# Patient Record
Sex: MALE | Race: WHITE | NOT HISPANIC OR LATINO
[De-identification: names, ages, dates, MRNs, and addresses within clinical notes are randomized per-mention and may not be internally consistent; named-entity substitution may affect disease eponyms.]

---

## 2017-08-23 ENCOUNTER — APPOINTMENT (OUTPATIENT)
Dept: PULMONOLOGY | Facility: CLINIC | Age: 72
End: 2017-08-23
Payer: MEDICARE

## 2017-08-23 VITALS
OXYGEN SATURATION: 93 % | RESPIRATION RATE: 16 BRPM | SYSTOLIC BLOOD PRESSURE: 115 MMHG | WEIGHT: 194 LBS | DIASTOLIC BLOOD PRESSURE: 80 MMHG | HEIGHT: 69 IN | HEART RATE: 59 BPM | BODY MASS INDEX: 28.73 KG/M2

## 2017-08-23 DIAGNOSIS — Z86.39 PERSONAL HISTORY OF OTHER ENDOCRINE, NUTRITIONAL AND METABOLIC DISEASE: ICD-10-CM

## 2017-08-23 DIAGNOSIS — Z82.49 FAMILY HISTORY OF ISCHEMIC HEART DISEASE AND OTHER DISEASES OF THE CIRCULATORY SYSTEM: ICD-10-CM

## 2017-08-23 DIAGNOSIS — Z87.438 PERSONAL HISTORY OF OTHER DISEASES OF MALE GENITAL ORGANS: ICD-10-CM

## 2017-08-23 DIAGNOSIS — Z81.8 FAMILY HISTORY OF OTHER MENTAL AND BEHAVIORAL DISORDERS: ICD-10-CM

## 2017-08-23 DIAGNOSIS — Z78.9 OTHER SPECIFIED HEALTH STATUS: ICD-10-CM

## 2017-08-23 DIAGNOSIS — Z86.79 PERSONAL HISTORY OF OTHER DISEASES OF THE CIRCULATORY SYSTEM: ICD-10-CM

## 2017-08-23 PROCEDURE — 94010 BREATHING CAPACITY TEST: CPT

## 2017-08-23 PROCEDURE — 99205 OFFICE O/P NEW HI 60 MIN: CPT | Mod: 25

## 2017-08-23 PROCEDURE — 71020: CPT

## 2017-08-23 RX ORDER — ALBUTEROL SULFATE 90 UG/1
108 (90 BASE) AEROSOL, METERED RESPIRATORY (INHALATION) EVERY 6 HOURS
Qty: 3 | Refills: 1 | Status: ACTIVE | COMMUNITY
Start: 2017-08-23 | End: 1900-01-01

## 2017-08-23 RX ORDER — OLOPATADINE HYDROCHLORIDE 665 UG/1
0.6 SPRAY, METERED NASAL
Qty: 3 | Refills: 1 | Status: ACTIVE | COMMUNITY
Start: 2017-08-23 | End: 1900-01-01

## 2017-08-23 RX ORDER — AMLODIPINE BESYLATE 10 MG/1
10 TABLET ORAL
Refills: 0 | Status: ACTIVE | COMMUNITY

## 2017-08-23 RX ORDER — ATORVASTATIN CALCIUM 20 MG/1
20 TABLET, FILM COATED ORAL
Refills: 0 | Status: ACTIVE | COMMUNITY

## 2017-11-28 ENCOUNTER — APPOINTMENT (OUTPATIENT)
Dept: PULMONOLOGY | Facility: CLINIC | Age: 72
End: 2017-11-28
Payer: MEDICARE

## 2017-11-28 VITALS
DIASTOLIC BLOOD PRESSURE: 80 MMHG | WEIGHT: 194 LBS | BODY MASS INDEX: 28.73 KG/M2 | OXYGEN SATURATION: 96 % | HEIGHT: 69 IN | SYSTOLIC BLOOD PRESSURE: 122 MMHG | HEART RATE: 80 BPM

## 2017-11-28 PROCEDURE — 94010 BREATHING CAPACITY TEST: CPT

## 2017-11-28 PROCEDURE — 99214 OFFICE O/P EST MOD 30 MIN: CPT | Mod: 25

## 2017-11-28 RX ORDER — MONTELUKAST SODIUM 10 MG/1
10 TABLET, FILM COATED ORAL
Qty: 3 | Refills: 1 | Status: ACTIVE | COMMUNITY
Start: 2017-11-28 | End: 1900-01-01

## 2018-01-30 ENCOUNTER — APPOINTMENT (OUTPATIENT)
Dept: UROLOGY | Facility: CLINIC | Age: 73
End: 2018-01-30
Payer: MEDICARE

## 2018-01-30 DIAGNOSIS — Z00.00 ENCOUNTER FOR GENERAL ADULT MEDICAL EXAMINATION W/OUT ABNORMAL FINDINGS: ICD-10-CM

## 2018-01-30 DIAGNOSIS — R97.20 ELEVATED PROSTATE, SPECIFIC ANTIGEN [PSA]: ICD-10-CM

## 2018-01-30 PROCEDURE — 99204 OFFICE O/P NEW MOD 45 MIN: CPT

## 2018-01-31 LAB
APPEARANCE: CLEAR
BACTERIA: NEGATIVE
BILIRUBIN URINE: NEGATIVE
BLOOD URINE: NEGATIVE
COLOR: YELLOW
GLUCOSE QUALITATIVE U: NEGATIVE MG/DL
HYALINE CASTS: 4 /LPF
KETONES URINE: NEGATIVE
LEUKOCYTE ESTERASE URINE: NEGATIVE
MICROSCOPIC-UA: NORMAL
NITRITE URINE: NEGATIVE
PH URINE: 5
PROTEIN URINE: ABNORMAL MG/DL
PSA FREE FLD-MCNC: 20.8
PSA FREE SERPL-MCNC: 1.19 NG/ML
PSA SERPL-MCNC: 5.71 NG/ML
RED BLOOD CELLS URINE: 5 /HPF
SPECIFIC GRAVITY URINE: 1.03
SQUAMOUS EPITHELIAL CELLS: 0 /HPF
UROBILINOGEN URINE: NEGATIVE MG/DL
WHITE BLOOD CELLS URINE: 1 /HPF

## 2018-02-01 LAB
BACTERIA UR CULT: NORMAL
CORE LAB FLUID CYTOLOGY: NORMAL

## 2018-02-13 ENCOUNTER — OTHER (OUTPATIENT)
Age: 73
End: 2018-02-13

## 2018-02-26 ENCOUNTER — FORM ENCOUNTER (OUTPATIENT)
Age: 73
End: 2018-02-26

## 2018-02-27 ENCOUNTER — APPOINTMENT (OUTPATIENT)
Dept: MRI IMAGING | Facility: CLINIC | Age: 73
End: 2018-02-27
Payer: MEDICARE

## 2018-02-27 ENCOUNTER — OUTPATIENT (OUTPATIENT)
Dept: OUTPATIENT SERVICES | Facility: HOSPITAL | Age: 73
LOS: 1 days | End: 2018-02-27
Payer: MEDICARE

## 2018-02-27 DIAGNOSIS — N40.1 BENIGN PROSTATIC HYPERPLASIA WITH LOWER URINARY TRACT SYMPTOMS: ICD-10-CM

## 2018-02-27 PROCEDURE — 82565 ASSAY OF CREATININE: CPT

## 2018-02-27 PROCEDURE — A9585: CPT

## 2018-02-27 PROCEDURE — 72197 MRI PELVIS W/O & W/DYE: CPT | Mod: 26

## 2018-02-27 PROCEDURE — 72197 MRI PELVIS W/O & W/DYE: CPT

## 2018-03-27 ENCOUNTER — APPOINTMENT (OUTPATIENT)
Dept: PULMONOLOGY | Facility: CLINIC | Age: 73
End: 2018-03-27
Payer: MEDICARE

## 2018-03-27 VITALS
SYSTOLIC BLOOD PRESSURE: 110 MMHG | DIASTOLIC BLOOD PRESSURE: 80 MMHG | OXYGEN SATURATION: 98 % | RESPIRATION RATE: 17 BRPM | BODY MASS INDEX: 28.73 KG/M2 | HEART RATE: 57 BPM | WEIGHT: 194 LBS | HEIGHT: 69 IN

## 2018-03-27 PROCEDURE — 99214 OFFICE O/P EST MOD 30 MIN: CPT | Mod: 25

## 2018-03-27 PROCEDURE — 94010 BREATHING CAPACITY TEST: CPT

## 2018-04-03 ENCOUNTER — RX RENEWAL (OUTPATIENT)
Age: 73
End: 2018-04-03

## 2018-06-07 ENCOUNTER — RX RENEWAL (OUTPATIENT)
Age: 73
End: 2018-06-07

## 2018-07-31 ENCOUNTER — APPOINTMENT (OUTPATIENT)
Dept: PULMONOLOGY | Facility: CLINIC | Age: 73
End: 2018-07-31
Payer: MEDICARE

## 2018-07-31 ENCOUNTER — NON-APPOINTMENT (OUTPATIENT)
Age: 73
End: 2018-07-31

## 2018-07-31 VITALS
SYSTOLIC BLOOD PRESSURE: 132 MMHG | WEIGHT: 198 LBS | RESPIRATION RATE: 17 BRPM | BODY MASS INDEX: 28.35 KG/M2 | OXYGEN SATURATION: 96 % | DIASTOLIC BLOOD PRESSURE: 82 MMHG | HEART RATE: 53 BPM | HEIGHT: 70 IN

## 2018-07-31 PROCEDURE — 99214 OFFICE O/P EST MOD 30 MIN: CPT | Mod: 25

## 2018-07-31 PROCEDURE — 95012 NITRIC OXIDE EXP GAS DETER: CPT

## 2018-07-31 PROCEDURE — 94010 BREATHING CAPACITY TEST: CPT

## 2018-07-31 RX ORDER — PREDNISONE 10 MG/1
10 TABLET ORAL
Qty: 100 | Refills: 0 | Status: DISCONTINUED | COMMUNITY
Start: 2017-08-23 | End: 2018-07-31

## 2018-07-31 RX ORDER — CLARITHROMYCIN 500 MG/1
500 TABLET, FILM COATED ORAL
Qty: 20 | Refills: 0 | Status: DISCONTINUED | COMMUNITY
Start: 2017-08-23 | End: 2018-07-31

## 2018-09-03 PROBLEM — R97.20 ELEVATED PROSTATE SPECIFIC ANTIGEN (PSA): Status: ACTIVE | Noted: 2018-01-30

## 2018-11-26 ENCOUNTER — FORM ENCOUNTER (OUTPATIENT)
Age: 73
End: 2018-11-26

## 2018-11-27 ENCOUNTER — APPOINTMENT (OUTPATIENT)
Dept: MRI IMAGING | Facility: CLINIC | Age: 73
End: 2018-11-27
Payer: MEDICARE

## 2018-11-27 ENCOUNTER — OUTPATIENT (OUTPATIENT)
Dept: OUTPATIENT SERVICES | Facility: HOSPITAL | Age: 73
LOS: 1 days | End: 2018-11-27
Payer: MEDICARE

## 2018-11-27 ENCOUNTER — NON-APPOINTMENT (OUTPATIENT)
Age: 73
End: 2018-11-27

## 2018-11-27 ENCOUNTER — APPOINTMENT (OUTPATIENT)
Dept: PULMONOLOGY | Facility: CLINIC | Age: 73
End: 2018-11-27
Payer: MEDICARE

## 2018-11-27 VITALS
HEIGHT: 70 IN | DIASTOLIC BLOOD PRESSURE: 74 MMHG | HEART RATE: 59 BPM | OXYGEN SATURATION: 98 % | SYSTOLIC BLOOD PRESSURE: 124 MMHG | WEIGHT: 202 LBS | BODY MASS INDEX: 28.92 KG/M2

## 2018-11-27 DIAGNOSIS — M25.511 PAIN IN RIGHT SHOULDER: ICD-10-CM

## 2018-11-27 DIAGNOSIS — Z00.8 ENCOUNTER FOR OTHER GENERAL EXAMINATION: ICD-10-CM

## 2018-11-27 PROCEDURE — 95012 NITRIC OXIDE EXP GAS DETER: CPT

## 2018-11-27 PROCEDURE — 73221 MRI JOINT UPR EXTREM W/O DYE: CPT | Mod: 26,RT

## 2018-11-27 PROCEDURE — 94010 BREATHING CAPACITY TEST: CPT

## 2018-11-27 PROCEDURE — 73221 MRI JOINT UPR EXTREM W/O DYE: CPT

## 2018-11-27 PROCEDURE — 99214 OFFICE O/P EST MOD 30 MIN: CPT | Mod: 25

## 2018-11-27 NOTE — HISTORY OF PRESENT ILLNESS
[FreeTextEntry1] : Mr. Amato is a 73 year old male presenting to the office today for a follow up visit for allergies, asthma, GERD, SIOBHAN, overweight, snoring, and SOB. His chief complaint is nocturia. \par - He comes in stating that he feels great\par - He notes occasionally morning congestion upon waking up. \par - He is not exercising\par - He continues to experience nocturia. On average, he gets up four times per night \par - He is taking Tamsulosin for her prostate at around 7-8PM. The amount of times he gets up to urinate remains unchanged. \par - He states that he rarely experiences reflux, and, if he does, it is associated with something he ate. \par - his memory and concentration is fine\par - He reports having had progressive right shoulder pain. He notes that it is exacerbated with any kind of movement or pressure. \par - He denies any headaches, nausea, vomiting, fever, chills, sweats, chest pain, chest pressure, palpitations, SOB, coughing, wheezing, fatigue, diarrhea, constipation, dysphagia, dizziness, leg swelling, leg pain, itchy eyes, itchy ears, or sour taste in the mouth.

## 2018-11-27 NOTE — PROCEDURE
[FreeTextEntry1] : PFT- spi reveals normal flows; FEV1 was 2.47L which is 82% of predicted; normal flow volume loop \par \par FENO was 29; a normal value being less than 25\par Fractional exhaled nitric oxide (FENO) is regarded as a simple, noninvasive method for assessing eosinophilic airway inflammation. Produced by a variety of cells within the lung, nitric oxide (NO) concentrations are generally low in healthy individuals. However, high concentrations of NO appear to be involved in nonspecific host defense mechanisms and chronic inflammatory diseases such as asthma. The American Thoracic Society (ATS) therefore has recommended using FENO to aid in the diagnosis and monitoring of eosinophilic airway inflammation and asthma, and for identifying steroid responsive individuals whose chronic respiratory symptoms may be caused by airway inflammation. \par \par \par

## 2018-11-27 NOTE — ASSESSMENT
[FreeTextEntry1] : Mr. Amato is a 73 year old male with a history of asthma allergy, GERD, and OSAS. His number one issue today was nocturia. \par \par problem 1: asthma\par -continue Singulair 10mg QHS \par -continue Breo Ellipta 200 at 1 inhalation QD\par -continue Ventolin PRN and before exercise\par -Asthma is  believed to be caused by inherited (genetic) and environmental factor, but its exact cause is unknown. Asthma may be triggered by allergens, lung infections, or irritants in the air. Asthma triggers are different for each person\par -Inhaler technique reviewed as well as oral hygiene techniques reviewed with patient. Avoidance of cold air, extremes of temperature, rescue inhaler should be used before exercise. Order of medication reviewed with patient. Recommended use of a cool mist humidifier in the bedroom.\par \par problem 2: post nasal drip syndrome/allergies\par -continue Clarinex 5 mg QHS \par -continue Olopatadine 0.6% 1 sniff/nostril BID\par \par -Environmental measures for allergies were encouraged including mattress and pillow cover, air purifier, and environmental controls.\par \par problem 3: GERD\par -continue Zantac 300 mg QHS\par -decrease caffeine \par \par -Rule of 2s: avoid eating too much, eating too late, eating too spicy, eating two hours before bed\par -Things to avoid including overeating, spicy foods, tight clothing, eating within three hours of bed, this list is not all inclusive. \par -For treatment of reflux, possible options discussed including diet control, H2 blockers, PPIs, as well as coating motility agents discussed as treatment options. Timing of meals and proximity of last meal to sleep were discussed. If symptoms persist, a formal gastrointestinal evaluation is needed.\par \par problem 5: overweight\par -Weight loss, exercise, and diet control were discussed and are highly encouraged. Treatment options were given such as, aqua therapy, and contacting a nutritionist. Recommended to use the elliptical, stationary bike, less use of treadmill.  Obesity is associated with worsening asthma, shortness of breath, and potential for cardiac disease, diabetes, and other underlying medical conditions.\par \par problem 6:  SIOBHAN\par -continue to use the oral appliance\par -repeat the sleep study with the oral appliance in place \par \par -Sleep apnea is associated with adverse clinical consequences which an affect most organ systems.  Cardiovascular disease risk includes arrhythmias, atrial fibrillation, hypertension, coronary artery disease, and stroke. Metabolic disorders include diabetes type 2, non-alcoholic fatty liver disease. Mood disorder especially depression; and cognitive decline especially in the elderly. Associations with  chronic reflux/Guadarrama’s esophagus some but not all inclusive. \par -Reasons to assess this include arousal consistent with hypopnea; respiratory events most prominent in REM sleep or supine position; therefore sleep staging and body position are important for accurate diagnosis and estimation of AHI.\par \par problem 7: health maintenance \par - Recommended Cialis 5 mg QHS for nocturia\par -recommended to change his Flomax to the night \par - He refused an influenza vaccine 2018\par -recommended strep pneumonia vaccines: Prevnar-13 vaccine, followed by Pneumo vaccine 23 one year following\par -recommended early intervention for URIs\par -recommended regular osteoporosis evaluations\par -recommended early dermatological evaluations\par -recommended after the age of 50 to the age of 70, colonoscopy every 5 years \par \par F/U in 2 months\par He is encouraged to call with any changes, concerns, or questions.

## 2018-11-27 NOTE — ADDENDUM
[FreeTextEntry1] : Documented by Wale Mosley acting as a scribe for Dr. Aris Shields on 11/27/18\par \par All medical record entries made by the Scribe were at my, Dr. Aris Shields's, direction and personally dictated by me on 11/27/18. I have reviewed the chart and agree that the record accurately reflects my personal performance of the history, physical exam, assessment and plan. I have also personally directed, reviewed, and agree with the discharge instructions. \par \par \par \par \par

## 2018-11-27 NOTE — REASON FOR VISIT
[Follow-Up] : a follow-up visit [Family Member] : family member [FreeTextEntry1] : allergies, asthma, GERD, SIOBHAN, overweight, snoring, and SOB

## 2018-11-29 ENCOUNTER — RX RENEWAL (OUTPATIENT)
Age: 73
End: 2018-11-29

## 2019-03-26 ENCOUNTER — APPOINTMENT (OUTPATIENT)
Dept: UROLOGY | Facility: CLINIC | Age: 74
End: 2019-03-26
Payer: MEDICARE

## 2019-03-26 PROCEDURE — 99214 OFFICE O/P EST MOD 30 MIN: CPT

## 2019-03-27 ENCOUNTER — APPOINTMENT (OUTPATIENT)
Dept: PULMONOLOGY | Facility: CLINIC | Age: 74
End: 2019-03-27
Payer: MEDICARE

## 2019-03-27 ENCOUNTER — NON-APPOINTMENT (OUTPATIENT)
Age: 74
End: 2019-03-27

## 2019-03-27 VITALS
DIASTOLIC BLOOD PRESSURE: 70 MMHG | HEART RATE: 60 BPM | HEIGHT: 70 IN | OXYGEN SATURATION: 98 % | RESPIRATION RATE: 17 BRPM | SYSTOLIC BLOOD PRESSURE: 125 MMHG | BODY MASS INDEX: 28.49 KG/M2 | WEIGHT: 199 LBS

## 2019-03-27 LAB
APPEARANCE: CLEAR
BACTERIA: NEGATIVE
BILIRUBIN URINE: NEGATIVE
BLOOD URINE: NEGATIVE
COLOR: YELLOW
GLUCOSE QUALITATIVE U: NEGATIVE
HYALINE CASTS: 1 /LPF
KETONES URINE: NEGATIVE
LEUKOCYTE ESTERASE URINE: NEGATIVE
MICROSCOPIC-UA: NORMAL
NITRITE URINE: NEGATIVE
PH URINE: 5.5
PROTEIN URINE: NORMAL
PSA FREE FLD-MCNC: 26 %
PSA FREE SERPL-MCNC: 1.51 NG/ML
PSA SERPL-MCNC: 5.89 NG/ML
RED BLOOD CELLS URINE: 1 /HPF
SPECIFIC GRAVITY URINE: 1.03
SQUAMOUS EPITHELIAL CELLS: 0 /HPF
UROBILINOGEN URINE: NORMAL
WHITE BLOOD CELLS URINE: 0 /HPF

## 2019-03-27 PROCEDURE — 94010 BREATHING CAPACITY TEST: CPT

## 2019-03-27 PROCEDURE — 99214 OFFICE O/P EST MOD 30 MIN: CPT | Mod: 25

## 2019-03-27 NOTE — HISTORY OF PRESENT ILLNESS
[FreeTextEntry1] : Mr. Amato is a 73 year old male presenting to the office today for a follow up visit for allergies, asthma, GERD, SIOBHAN, overweight, snoring, and SOB. His chief complaint is nocturia \par - He comes in stating that he feels great following a 3 week bout of URI. Once he began his medicine, he felt that he was able to overcome fairly easily. \par - He has nocturia 3-4x/night. \par - He saw Dr. Zamora who recommended increasing his Flomax to 2 \par - He states that his memory has been relatively poor as he misplaces his things. \par - His bowels are regular\par - His senses of smell, vision, and taste are fine, but he is having some hearing difficulty \par - His breathing is generally well. \par - He  denies any headaches, nausea, vomiting, fever, chills, sweats, chest pain, chest pressure, palpitations, SOB, fatigue, diarrhea, constipation, dysphagia, myalgias, dizziness, leg swelling, leg pain, itchy eyes, itchy ears, heartburn, reflux, or sour taste in the mouth.

## 2019-03-27 NOTE — ASSESSMENT
[FreeTextEntry1] : Mr. Amato is a 73 year old male with a history of asthma allergy, GERD, and OSAS. His number one issue today is nocturia. \par \par problem 1: asthma (stable)\par -continue Singulair 10mg QHS \par -continue Breo Ellipta 200 at 1 inhalation QD\par -continue Ventolin PRN and before exercise\par -Asthma is  believed to be caused by inherited (genetic) and environmental factor, but its exact cause is unknown. Asthma may be triggered by allergens, lung infections, or irritants in the air. Asthma triggers are different for each person\par -Inhaler technique reviewed as well as oral hygiene techniques reviewed with patient. Avoidance of cold air, extremes of temperature, rescue inhaler should be used before exercise. Order of medication reviewed with patient. Recommended use of a cool mist humidifier in the bedroom.\par \par problem 2: post nasal drip syndrome/allergies\par -continue Clarinex 5 mg QHS \par -continue Olopatadine 0.6% 1 sniff/nostril BID\par \par -Environmental measures for allergies were encouraged including mattress and pillow cover, air purifier, and environmental controls.\par \par problem 3: GERD\par -continue Zantac 300 mg QHS\par -decrease caffeine \par \par -Rule of 2s: avoid eating too much, eating too late, eating too spicy, eating two hours before bed\par -Things to avoid including overeating, spicy foods, tight clothing, eating within three hours of bed, this list is not all inclusive. \par -For treatment of reflux, possible options discussed including diet control, H2 blockers, PPIs, as well as coating motility agents discussed as treatment options. Timing of meals and proximity of last meal to sleep were discussed. If symptoms persist, a formal gastrointestinal evaluation is needed.\par \par problem 5: overweight\par -Weight loss, exercise, and diet control were discussed and are highly encouraged. Treatment options were given such as, aqua therapy, and contacting a nutritionist. Recommended to use the elliptical, stationary bike, less use of treadmill.  Obesity is associated with worsening asthma, shortness of breath, and potential for cardiac disease, diabetes, and other underlying medical conditions.\par \par problem 6:  SIOBHAN\par -continue to use the oral appliance\par -repeat the sleep study with the oral appliance in place \par \par -Sleep apnea is associated with adverse clinical consequences which an affect most organ systems.  Cardiovascular disease risk includes arrhythmias, atrial fibrillation, hypertension, coronary artery disease, and stroke. Metabolic disorders include diabetes type 2, non-alcoholic fatty liver disease. Mood disorder especially depression; and cognitive decline especially in the elderly. Associations with  chronic reflux/Guadarrama’s esophagus some but not all inclusive. \par -Reasons to assess this include arousal consistent with hypopnea; respiratory events most prominent in REM sleep or supine position; therefore sleep staging and body position are important for accurate diagnosis and estimation of AHI.\par \par problem 7: health maintenance \par - Recommended Cialis 5 mg QHS for nocturia\par -Continue Flomax 4 mg BID\par - He refused an influenza vaccine 2018\par -recommended strep pneumonia vaccines: Prevnar-13 vaccine, followed by Pneumo vaccine 23 one year following\par -recommended early intervention for URIs\par -recommended regular osteoporosis evaluations\par -recommended early dermatological evaluations\par -recommended after the age of 50 to the age of 70, colonoscopy every 5 years \par \par F/U in 4 months\par He is encouraged to call with any changes, concerns, or questions.

## 2019-03-27 NOTE — ADDENDUM
[FreeTextEntry1] : Documented by Wale Mosley acting as a scribe for Dr. Aris Shields on 3/27/2019\par \par All medical record entries made by the Scribe were at my, Dr. Aris Shields's, direction and personally dictated by me on 3/27/2019. I have reviewed the chart and agree that the record accurately reflects my personal performance of the history, physical exam, assessment and plan. I have also personally directed, reviewed, and agree with the discharge instructions. \par \par \par \par \par

## 2019-03-27 NOTE — PROCEDURE
[FreeTextEntry1] : PFT- spi reveals normal flows; FEV1 was 2.51L which is 83% of predicted; normal flow volume loop

## 2019-07-24 ENCOUNTER — APPOINTMENT (OUTPATIENT)
Dept: PULMONOLOGY | Facility: CLINIC | Age: 74
End: 2019-07-24
Payer: MEDICARE

## 2019-07-24 ENCOUNTER — NON-APPOINTMENT (OUTPATIENT)
Age: 74
End: 2019-07-24

## 2019-07-24 VITALS
HEIGHT: 68 IN | HEART RATE: 77 BPM | RESPIRATION RATE: 16 BRPM | SYSTOLIC BLOOD PRESSURE: 140 MMHG | DIASTOLIC BLOOD PRESSURE: 84 MMHG | BODY MASS INDEX: 29.4 KG/M2 | OXYGEN SATURATION: 97 % | WEIGHT: 194 LBS

## 2019-07-24 PROCEDURE — 95012 NITRIC OXIDE EXP GAS DETER: CPT

## 2019-07-24 PROCEDURE — 94010 BREATHING CAPACITY TEST: CPT

## 2019-07-24 PROCEDURE — 99214 OFFICE O/P EST MOD 30 MIN: CPT | Mod: 25

## 2019-07-24 RX ORDER — TAMSULOSIN HYDROCHLORIDE 0.4 MG/1
0.4 CAPSULE ORAL
Refills: 0 | Status: DISCONTINUED | COMMUNITY
End: 2019-07-24

## 2019-07-24 NOTE — ASSESSMENT
[FreeTextEntry1] : Mr. Amato is a 74 year old male with a history of asthma allergy, GERD, and OSAS. His number one issue today is still nocturia. \par \par problem 1: asthma (stable)\par -continue Singulair 10mg QHS \par -continue Breo Ellipta 200 at 1 inhalation QD\par -continue Ventolin PRN and before exercise\par -Asthma is  believed to be caused by inherited (genetic) and environmental factor, but its exact cause is unknown. Asthma may be triggered by allergens, lung infections, or irritants in the air. Asthma triggers are different for each person\par -Inhaler technique reviewed as well as oral hygiene techniques reviewed with patient. Avoidance of cold air, extremes of temperature, rescue inhaler should be used before exercise. Order of medication reviewed with patient. Recommended use of a cool mist humidifier in the bedroom.\par \par problem 2: post nasal drip syndrome/allergies\par -continue Clarinex 5 mg QHS \par -continue Olopatadine 0.6% 1 sniff/nostril BID\par \par -Environmental measures for allergies were encouraged including mattress and pillow cover, air purifier, and environmental controls.\par \par problem 3: GERD\par -continue Zantac 300 mg QHS\par -decrease caffeine \par \par -Rule of 2s: avoid eating too much, eating too late, eating too spicy, eating two hours before bed\par -Things to avoid including overeating, spicy foods, tight clothing, eating within three hours of bed, this list is not all inclusive. \par -For treatment of reflux, possible options discussed including diet control, H2 blockers, PPIs, as well as coating motility agents discussed as treatment options. Timing of meals and proximity of last meal to sleep were discussed. If symptoms persist, a formal gastrointestinal evaluation is needed.\par \par problem 5: overweight\par -Weight loss, exercise, and diet control were discussed and are highly encouraged. Treatment options were given such as, aqua therapy, and contacting a nutritionist. Recommended to use the elliptical, stationary bike, less use of treadmill.  Obesity is associated with worsening asthma, shortness of breath, and potential for cardiac disease, diabetes, and other underlying medical conditions.\par \par problem 6:  SIOBHAN\par -continue to use the oral appliance\par -repeat the sleep study with the oral appliance in place \par \par -Sleep apnea is associated with adverse clinical consequences which an affect most organ systems.  Cardiovascular disease risk includes arrhythmias, atrial fibrillation, hypertension, coronary artery disease, and stroke. Metabolic disorders include diabetes type 2, non-alcoholic fatty liver disease. Mood disorder especially depression; and cognitive decline especially in the elderly. Associations with  chronic reflux/Guadarrama’s esophagus some but not all inclusive. \par -Reasons to assess this include arousal consistent with hypopnea; respiratory events most prominent in REM sleep or supine position; therefore sleep staging and body position are important for accurate diagnosis and estimation of AHI.\par \par problem 7: health maintenance (Steckel)\par - Recommended Cialis 5 mg QHS for nocturia\par -Continue Flomax 4 mg BID\par - He refused an influenza vaccine 2018\par -recommended strep pneumonia vaccines: Prevnar-13 vaccine, followed by Pneumo vaccine 23 one year following\par -recommended early intervention for URIs\par -recommended regular osteoporosis evaluations\par -recommended early dermatological evaluations\par -recommended after the age of 50 to the age of 70, colonoscopy every 5 years \par \par F/U in 4 months with SPI and NiOx\par He is encouraged to call with any changes, concerns, or questions.

## 2019-07-24 NOTE — ADDENDUM
[FreeTextEntry1] : Documented by Alberto Conteh acting as a scribe for Dr. Aris Shields on 07/24/2019.\par \par All medical record entries made by the Scribe were at my, Dr. Aris Shields's, direction and personally dictated by me on 07/24/2019. I have reviewed the chart and agree that the record accurately reflects my personal performance of the history, physical exam, assessment and plan. I have also personally directed, reviewed, and agree with the discharge instructions. \par \par \par

## 2019-07-24 NOTE — HISTORY OF PRESENT ILLNESS
[FreeTextEntry1] : Mr. Amato is a 74 year old male presenting to the office today for a follow up visit for allergies, asthma, GERD, SIOBHAN, overweight, snoring, and SOB. His chief complaint is nocturia.\par -he reports feeling very well\par -he reports having lost weight by controlling his diet, not snacking, and exercising more\par -He notes His bowels are regular\par -he reports waking with nocturia 3-4 times nightly.  He notes he drinks at night when he is thirsty or dry, at around 8 PM\par -he reports he drinks 3-4 cups of coffee daily, and is unable to lessen his consumption, and his last cup is at about 4-5 PM\par -he reports he usually goes to sleep at around 11:30 PM\par -he denies taking any new medications, vitamins, or supplements, except vitamin D\par -he denies any SOB, coughing, wheezing, chest pain, chest pressure, diarrhea, constipation, dysphagia, dizziness, leg swelling, leg pain, heartburn, reflux, sour taste in the mouth, myalgias or arthralgias.

## 2019-07-24 NOTE — REVIEW OF SYSTEMS
[Nocturia] : nocturia [Negative] : Sleep Disorder [Recent Wt Loss (___ Lbs)] : recent [unfilled] ~Ulb weight loss [As Noted in HPI] : as noted in HPI [Cough] : no cough [Wheezing] : no wheezing [Dyspnea] : no dyspnea

## 2019-07-24 NOTE — PHYSICAL EXAM
[Well Groomed] : well groomed [Normal Appearance] : normal appearance [General Appearance - Well Developed] : well developed [No Deformities] : no deformities [General Appearance - Well Nourished] : well nourished [General Appearance - In No Acute Distress] : no acute distress [Eyelids - No Xanthelasma] : the eyelids demonstrated no xanthelasmas [Normal Conjunctiva] : the conjunctiva exhibited no abnormalities [Normal Oropharynx] : normal oropharynx [II] : II [Neck Appearance] : the appearance of the neck was normal [Thyroid Diffuse Enlargement] : the thyroid was not enlarged [Jugular Venous Distention Increased] : there was no jugular-venous distention [Neck Cervical Mass (___cm)] : no neck mass was observed [Thyroid Nodule] : there were no palpable thyroid nodules [Heart Sounds] : normal S1 and S2 [Heart Rate And Rhythm] : heart rate and rhythm were normal [Murmurs] : no murmurs present [Respiration, Rhythm And Depth] : normal respiratory rhythm and effort [Exaggerated Use Of Accessory Muscles For Inspiration] : no accessory muscle use [Auscultation Breath Sounds / Voice Sounds] : lungs were clear to auscultation bilaterally [Abdomen Tenderness] : non-tender [Abdomen Soft] : soft [Abdomen Mass (___ Cm)] : no abdominal mass palpated [Abnormal Walk] : normal gait [Gait - Sufficient For Exercise Testing] : the gait was sufficient for exercise testing [Cyanosis, Localized] : no localized cyanosis [Nail Clubbing] : no clubbing of the fingernails [Petechial Hemorrhages (___cm)] : no petechial hemorrhages [Skin Color & Pigmentation] : normal skin color and pigmentation [] : no rash [No Venous Stasis] : no venous stasis [Skin Lesions] : no skin lesions [No Xanthoma] : no  xanthoma was observed [No Skin Ulcers] : no skin ulcer [Deep Tendon Reflexes (DTR)] : deep tendon reflexes were 2+ and symmetric [Sensation] : the sensory exam was normal to light touch and pinprick [No Focal Deficits] : no focal deficits [Impaired Insight] : insight and judgment were intact [Oriented To Time, Place, And Person] : oriented to person, place, and time [Affect] : the affect was normal [FreeTextEntry1] : I:E 1:3; clear

## 2019-07-24 NOTE — PROCEDURE
[FreeTextEntry1] : PFT revealed normal flows, with a FEV1 of 2.77L, which is 97% of predicted, with a normal flow volume loop \par \par FENO was 30; a normal value being less than 25\par Fractional exhaled nitric oxide (FENO) is regarded as a simple, noninvasive method for assessing eosinophilic airway inflammation. Produced by a variety of cells within the lung, nitric oxide (NO) concentrations are generally low in healthy individuals. However, high concentrations of NO appear to be involved in nonspecific host defense mechanisms and chronic inflammatory diseases such as asthma. The American Thoracic Society (ATS) therefore has recommended using FENO to aid in the diagnosis and monitoring of eosinophilic airway inflammation and asthma, and for identifying steroid responsive individuals whose chronic respiratory symptoms may be caused by airway inflammation.

## 2019-09-18 ENCOUNTER — RX RENEWAL (OUTPATIENT)
Age: 74
End: 2019-09-18

## 2019-11-21 ENCOUNTER — NON-APPOINTMENT (OUTPATIENT)
Age: 74
End: 2019-11-21

## 2019-11-21 ENCOUNTER — APPOINTMENT (OUTPATIENT)
Dept: PULMONOLOGY | Facility: CLINIC | Age: 74
End: 2019-11-21
Payer: MEDICARE

## 2019-11-21 VITALS
HEART RATE: 57 BPM | OXYGEN SATURATION: 97 % | HEIGHT: 68 IN | DIASTOLIC BLOOD PRESSURE: 80 MMHG | RESPIRATION RATE: 16 BRPM | WEIGHT: 198 LBS | BODY MASS INDEX: 30.01 KG/M2 | SYSTOLIC BLOOD PRESSURE: 124 MMHG

## 2019-11-21 PROCEDURE — 99214 OFFICE O/P EST MOD 30 MIN: CPT | Mod: 25

## 2019-11-21 PROCEDURE — 95012 NITRIC OXIDE EXP GAS DETER: CPT

## 2019-11-21 PROCEDURE — 94010 BREATHING CAPACITY TEST: CPT

## 2019-11-21 NOTE — ADDENDUM
[FreeTextEntry1] : All medical record entries made by viv Minor were at Dr. Aris Shields's direction and personally dictated by me on 11/21/2019. I have reviewed the chart and agree that the record accurately reflects my personal performance of the history, physical exam, assessment and plan. I have also personally directed, reviewed, and agree with the discharge instructions. \par \par \par

## 2019-11-21 NOTE — REVIEW OF SYSTEMS
[As Noted in HPI] : as noted in HPI [Nocturia] : nocturia [Negative] : Sleep Disorder [Recent Wt Gain (___ Lbs)] : recent [unfilled] ~Ulb weight gain [Cough] : no cough [Dyspnea] : no dyspnea [Wheezing] : no wheezing

## 2019-11-21 NOTE — ASSESSMENT
[FreeTextEntry1] : Mr. Amato is a 74 year old male with a history of asthma, allergy, BPH, overweight, GERD, and OSAS. His number one issue today is still nocturia (still).\par \par His shortness of breath is multifactorial due to:\par -poor mechanics of breathing \par -out of shape / overweight\par -pulmonary disease -(asthma)\par \par problem 1: asthma -(stable)\par -continue Singulair 10 mg QHS \par -continue Breo Ellipta 200 at 1 inhalation QD\par -continue Ventolin PRN and before exercise\par -Asthma is  believed to be caused by inherited (genetic) and environmental factor, but its exact cause is unknown. Asthma may be triggered by allergens, lung infections, or irritants in the air. Asthma triggers are different for each person\par -Inhaler technique reviewed as well as oral hygiene techniques reviewed with patient. Avoidance of cold air, extremes of temperature, rescue inhaler should be used before exercise. Order of medication reviewed with patient. Recommended use of a cool mist humidifier in the bedroom.\par \par problem 2: post nasal drip syndrome / allergies\par -continue Clarinex 5 mg QHS \par -continue Olopatadine 0.6% 1 sniff/nostril BID\par -Environmental measures for allergies were encouraged including mattress and pillow cover, air purifier, and environmental controls.\par \par problem 3: GERD\par -continue Zantac 300 mg QHS\par -decrease caffeine \par -Rule of 2s: avoid eating too much, eating too late, eating too spicy, eating two hours before bed\par -Things to avoid including overeating, spicy foods, tight clothing, eating within three hours of bed, this list is not all inclusive. \par -For treatment of reflux, possible options discussed including diet control, H2 blockers, PPIs, as well as coating motility agents discussed as treatment options. Timing of meals and proximity of last meal to sleep were discussed. If symptoms persist, a formal gastrointestinal evaluation is needed.\par \par problem 5: overweight\par -Weight loss, exercise, and diet control were discussed and are highly encouraged. Treatment options were given such as, aqua therapy, and contacting a nutritionist. Recommended to use the elliptical, stationary bike, less use of treadmill.  Obesity is associated with worsening asthma, shortness of breath, and potential for cardiac disease, diabetes, and other underlying medical conditions.\par \par problem 6:  SIOBHAN\par -continue to use the oral appliance\par -repeat the sleep study with the oral appliance in place \par -Sleep apnea is associated with adverse clinical consequences which an affect most organ systems.  Cardiovascular disease risk includes arrhythmias, atrial fibrillation, hypertension, coronary artery disease, and stroke. Metabolic disorders include diabetes type 2, non-alcoholic fatty liver disease. Mood disorder especially depression; and cognitive decline especially in the elderly. Associations with  chronic reflux/Guadarrama’s esophagus some but not all inclusive. \par -Reasons to assess this include arousal consistent with hypopnea; respiratory events most prominent in REM sleep or supine position; therefore sleep staging and body position are important for accurate diagnosis and estimation of AHI.\par \par problem 7: poor breathing mechanics\par -Proper breathing techniques were reviewed with an emphasis of exhalation. Patient instructed to breath in for 1 second and out for four seconds. Patient was encouraged to not talk while walking.\par \par problem 8: health maintenance \par -Recommended Cialis 5 mg QHS for nocturia (Steckel)\par -Continue Flomax 4 mg BID\par -He refused an influenza vaccine 2018 / 2019\par -recommended strep pneumonia vaccines: Prevnar-13 vaccine, followed by Pneumo vaccine 23 one year following\par -recommended early intervention for URIs\par -recommended regular osteoporosis evaluations\par -recommended early dermatological evaluations\par -recommended after the age of 50 to the age of 70, colonoscopy every 5 years \par \par F/U in 4 months with SPI and NiOx\par He is encouraged to call with any changes, concerns, or questions.

## 2019-11-21 NOTE — PROCEDURE
[FreeTextEntry1] : PFT - spi reveals normal flows; FEV1 is 2.65 which is 93% of predicted, normal flow volume loop \par \par FENO was 21; a normal value being less than 25\par \par Fractional exhaled nitric oxide (FENO) is regarded as a simple, noninvasive method for assessing eosinophilic airway inflammation. Produced by a variety of cells within the lung, nitric oxide (NO) concentrations are generally low in healthy individuals. However, high concentrations of NO appear to be involved in nonspecific host defense mechanisms and chronic inflammatory diseases such as asthma. The American Thor[employer] Society (ATS) therefore has recommended using FENO to aid in the diagnosis and monitoring of eosinophilic airway inflammation and asthma, and for identifying steroid responsive individuals whose chronic respiratory symptoms may be caused by airway inflammation.

## 2019-11-21 NOTE — PHYSICAL EXAM

## 2020-03-31 ENCOUNTER — APPOINTMENT (OUTPATIENT)
Dept: UROLOGY | Facility: CLINIC | Age: 75
End: 2020-03-31

## 2020-04-01 ENCOUNTER — APPOINTMENT (OUTPATIENT)
Dept: PULMONOLOGY | Facility: CLINIC | Age: 75
End: 2020-04-01

## 2020-06-23 ENCOUNTER — RX RENEWAL (OUTPATIENT)
Age: 75
End: 2020-06-23

## 2020-07-14 ENCOUNTER — APPOINTMENT (OUTPATIENT)
Dept: UROLOGY | Facility: CLINIC | Age: 75
End: 2020-07-14
Payer: MEDICARE

## 2020-07-14 VITALS — TEMPERATURE: 97.8 F

## 2020-07-14 DIAGNOSIS — R97.20 ELEVATED PROSTATE, SPECIFIC ANTIGEN [PSA]: ICD-10-CM

## 2020-07-14 PROCEDURE — 99213 OFFICE O/P EST LOW 20 MIN: CPT

## 2020-07-14 NOTE — PHYSICAL EXAM
[General Appearance - Well Nourished] : well nourished [General Appearance - Well Developed] : well developed [Normal Appearance] : normal appearance [Well Groomed] : well groomed [Abdomen Soft] : soft [General Appearance - In No Acute Distress] : no acute distress [Abdomen Tenderness] : non-tender [Costovertebral Angle Tenderness] : no ~M costovertebral angle tenderness [Urethral Meatus] : meatus normal [Urinary Bladder Findings] : the bladder was normal on palpation [Scrotum] : the scrotum was normal [Testes Mass (___cm)] : there were no testicular masses [No Prostate Nodules] : no prostate nodules [Edema] : no peripheral edema [] : no respiratory distress [Respiration, Rhythm And Depth] : normal respiratory rhythm and effort [Exaggerated Use Of Accessory Muscles For Inspiration] : no accessory muscle use [Oriented To Time, Place, And Person] : oriented to person, place, and time [Affect] : the affect was normal [Mood] : the mood was normal [Not Anxious] : not anxious [Normal Station and Gait] : the gait and station were normal for the patient's age [No Focal Deficits] : no focal deficits [No Palpable Adenopathy] : no palpable adenopathy

## 2020-07-15 ENCOUNTER — APPOINTMENT (OUTPATIENT)
Dept: PULMONOLOGY | Facility: CLINIC | Age: 75
End: 2020-07-15
Payer: MEDICARE

## 2020-07-15 VITALS
DIASTOLIC BLOOD PRESSURE: 70 MMHG | SYSTOLIC BLOOD PRESSURE: 120 MMHG | RESPIRATION RATE: 17 BRPM | OXYGEN SATURATION: 98 % | HEART RATE: 60 BPM | HEIGHT: 68 IN | WEIGHT: 192 LBS | TEMPERATURE: 98.9 F | BODY MASS INDEX: 29.1 KG/M2

## 2020-07-15 LAB
APPEARANCE: CLEAR
BACTERIA: NEGATIVE
BILIRUBIN URINE: NEGATIVE
BLOOD URINE: NEGATIVE
COLOR: YELLOW
GLUCOSE QUALITATIVE U: NEGATIVE
HYALINE CASTS: 0 /LPF
KETONES URINE: NEGATIVE
LEUKOCYTE ESTERASE URINE: ABNORMAL
MICROSCOPIC-UA: NORMAL
NITRITE URINE: NEGATIVE
PH URINE: 6
PROTEIN URINE: ABNORMAL
PSA FREE FLD-MCNC: 24 %
PSA FREE SERPL-MCNC: 1.92 NG/ML
PSA SERPL-MCNC: 7.89 NG/ML
RED BLOOD CELLS URINE: 3 /HPF
SPECIFIC GRAVITY URINE: 1.03
SQUAMOUS EPITHELIAL CELLS: 0 /HPF
UROBILINOGEN URINE: NORMAL
WHITE BLOOD CELLS URINE: 3 /HPF

## 2020-07-15 PROCEDURE — 99214 OFFICE O/P EST MOD 30 MIN: CPT

## 2020-07-15 RX ORDER — RANITIDINE 300 MG/1
300 TABLET ORAL
Qty: 90 | Refills: 1 | Status: DISCONTINUED | COMMUNITY
Start: 2017-08-23 | End: 2020-07-15

## 2020-07-15 NOTE — PHYSICAL EXAM

## 2020-07-15 NOTE — ASSESSMENT
[FreeTextEntry1] : Mr. Amato is a 75 year old male with a history of asthma, allergy, BPH, overweight, GERD, and OSAS. His number one issue today is still nocturia (still).\par \par His shortness of breath is multifactorial due to:\par -poor mechanics of breathing \par -out of shape / overweight\par -pulmonary disease -(asthma)\par \par problem 1: asthma -(stable)\par -continue Singulair 10 mg QHS \par -continue Breo Ellipta 200 at 1 inhalation QD\par -continue Ventolin PRN and before exercise\par -Asthma is  believed to be caused by inherited (genetic) and environmental factor, but its exact cause is unknown. Asthma may be triggered by allergens, lung infections, or irritants in the air. Asthma triggers are different for each person\par -Inhaler technique reviewed as well as oral hygiene techniques reviewed with patient. Avoidance of cold air, extremes of temperature, rescue inhaler should be used before exercise. Order of medication reviewed with patient. 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 \par -For treatment of reflux, possible options discussed including diet control, H2 blockers, PPIs, as well as coating motility agents discussed as treatment options. Timing of meals and proximity of last meal to sleep were discussed. If symptoms persist, a formal gastrointestinal evaluation is needed.\par \par problem 5: overweight\par -Weight loss, exercise, and diet control were discussed and are highly encouraged. Treatment options were given such as, aqua therapy, and contacting a nutritionist. Recommended to use the elliptical, stationary bike, less use of treadmill.  Obesity is associated with worsening asthma, shortness of breath, and potential for cardiac disease, diabetes, and other underlying medical conditions.\par \par problem 6:  SIOBHAN\par -continue to use the oral appliance\par -repeat the sleep study with the oral appliance in place \par -Sleep apnea is associated with adverse clinical consequences which an affect most organ systems.  Cardiovascular disease risk includes arrhythmias, atrial fibrillation, hypertension, coronary artery disease, and stroke. Metabolic disorders include diabetes type 2, non-alcoholic fatty liver disease. Mood disorder especially depression; and cognitive decline especially in the elderly. Associations with  chronic reflux/Ugadarrama’s esophagus some but not all inclusive. \par -Reasons to assess this include arousal consistent with hypopnea; respiratory events most prominent in REM sleep or supine position; therefore sleep staging and body position are important for accurate diagnosis and estimation of AHI.\par \par problem 7: poor breathing mechanics\par -Proper breathing techniques were reviewed with an emphasis of exhalation. Patient instructed to breath in for 1 second and out for four seconds. Patient was encouraged to not talk while walking.\par \par Problem 8: Health Maintenance/COVID19 Precautions:\par - Clean your hands often. Wash your hands often with soap and water for at least 20 seconds, especially after blowing your nose, coughing, or sneezing, or having been in a public place.\par - If soap and water are not available, use a hand  that contains at least 60% alcohol.\par - To the extent possible, avoid touching high-touch surfaces in public places - elevator buttons, door handles, handrails, handshaking with people, etc. Use a tissue or your sleeve to cover your hand or finger if you must touch something.\par - Wash your hands after touching surfaces in public places.\par - Avoid touching your face, nose, eyes, etc.\par - Clean and disinfect your home to remove germs: practice routine cleaning of frequently touched surfaces (for example: tables, doorknobs, light switches, handles, desks, toilets, faucets, sinks & cell phones)\par - Avoid crowds, especially in poorly ventilated spaces. Your risk of exposure to respiratory viruses like COVID-19 may increase in crowded, closed-in settings with little air circulation if\par there are people in the crowd who are sick. All patients are recommended to practice social distancing and stay at least 6 feet away from others.\par - Avoid all non-essential travel including plane trips, and especially avoid embarking on cruise ships.\par -If COVID-19 is spreading in your community, take extra measures to put distance between yourself and other people to further reduce your risk of being exposed to this new virus.\par -Stay home as much as possible.\par - Consider ways of getting food brought to your house through family, social, or commercial networks\par -Be aware that the virus has been known to live in the air up to 3 hours post exposure, cardboard up to 24 hours post exposure, copper up to 4 hours post exposure, steel and plastic up to 2-3 days post exposure. Risk of transmission from these surfaces are affected by many variables.\par \par Immune Support Recommendations:\par -OTC Vitamin C 500mg BID \par -OTC Quercetin 250-500mg BID \par -OTC Zinc 75-100mg per day \par -OTC Melatonin 1or 2mg a night \par -OTC Vitamin D 1-4000mg per day\par -Tonic Water 8oz\par -Recommended to Stay Hydrated (At least a gallon/day)\par \par Asthma and COVID19:\par You need to make sure your asthma is under control. This often requires the use of inhaled corticosteroids (and sometimes oral corticosteroids). Inhaled corticosteroids do not likely reduce your immune system’s ability to fight infections, but oral corticosteroids may. It is important to use the steps above to protect yourself to limit your exposure to any respiratory virus. \par \par problem 9: health maintenance \par -Recommended Cialis 5 mg QHS for nocturia (Steckel)\par -Continue Flomax 4 mg BID\par -He refused an influenza vaccine 2018 / 2019\par -recommended strep pneumonia vaccines: Prevnar-13 vaccine, followed by Pneumo vaccine 23 one year following\par -recommended early intervention for URIs\par -recommended regular osteoporosis evaluations\par -recommended early dermatological evaluations\par -recommended after the age of 50 to the age of 70, colonoscopy every 5 years \par \par F/U in 4 months with SPI and NiOx\par He is encouraged to call with any changes, concerns, or questions.

## 2020-07-15 NOTE — HISTORY OF PRESENT ILLNESS
[FreeTextEntry1] : Mr. Amato is a 75 year old male presenting to the office today for a follow up visit for allergies, asthma, GERD, SIOBHAN, overweight, snoring, and SOB. His chief complaint is health maintenance. \par -he states that he is generally feeling well. \par -no problems at all.\par -he notes that His bowels are regular. \par -has been sleeping well except for nocturia. \par -he has gotten used to nocturia and does not mind it much anymore. \par -Sense of Smell is good. \par -Sense of Taste is good.\par -he wishes to reduce the amount of urination at night. \par \par -He denies any headaches, nausea, vomiting, fever, chills, sweats, chest pain, chest pressure, diarrhea, constipation, dysphagia, dizziness, sour taste in the mouth, leg swelling, leg pain, itchy eyes, itchy ears, heartburn, reflux, myalgias or arthralgias.

## 2020-12-01 ENCOUNTER — RX RENEWAL (OUTPATIENT)
Age: 75
End: 2020-12-01

## 2021-04-07 ENCOUNTER — APPOINTMENT (OUTPATIENT)
Dept: PULMONOLOGY | Facility: CLINIC | Age: 76
End: 2021-04-07
Payer: MEDICARE

## 2021-04-07 VITALS
HEART RATE: 50 BPM | HEIGHT: 68 IN | BODY MASS INDEX: 28.64 KG/M2 | TEMPERATURE: 97.3 F | DIASTOLIC BLOOD PRESSURE: 79 MMHG | SYSTOLIC BLOOD PRESSURE: 121 MMHG | RESPIRATION RATE: 17 BRPM | OXYGEN SATURATION: 98 % | WEIGHT: 189 LBS

## 2021-04-07 PROCEDURE — 99214 OFFICE O/P EST MOD 30 MIN: CPT | Mod: CS,25

## 2021-04-07 NOTE — ADDENDUM
[FreeTextEntry1] : Documented by Tameka Talbert acting as a scribe for Dr. Aris Shields on 04/07/2021.\par \par All medical record entries made by the Scribe were at my, Dr. Aris Shields's, direction and personally dictated by me on 04/07/2021 . I have reviewed the chart and agree that the record accurately reflects my personal performance of the history, physical exam, assessment and plan. I have also personally directed, reviewed, and agree with the discharge instructions. \par

## 2021-04-07 NOTE — HISTORY OF PRESENT ILLNESS
[FreeTextEntry1] : Mr. Amato is a 75 year old male presenting to the office today for a follow up visit for allergies, asthma, GERD, SIOBHAN, overweight, snoring, and SOB. His chief complaint is \par \par -s/p COVID infection 12/2020 or 01/2021\par -pt notes good energy level; 8/10\par -pt reports recent weight loss; changed diet- no red meat, cheese, and eating smaller portions\par -pt denies regular exercise\par -pt notes good sleep pattern\par -pt denies heartburn/reflux\par -pt denies sinus issues\par -pt denies ankle/leg swelling\par -pt denies hoarseness\par -pt notes nocturia; improved\par -COVID vaccine d/w pt- recommended J&J\par \par \par -denies any chest pain, chest pressure, diarrhea, constipation, dysphagia, sour taste in the mouth, dizziness, leg swelling, leg pain, myalgias, arthralgias, itchy eyes, itchy ears, heartburn, or reflux.\par \par

## 2021-04-07 NOTE — COUNSELING
[Encouraged to increase physical activity] : Encouraged to increase physical activity [____ min/wk Activity] : [unfilled] min/wk activity [Good understanding] : Patient has a good understanding of disease, goals and obesity follow-up plan

## 2021-04-07 NOTE — ASSESSMENT
[FreeTextEntry1] : Mr. Amato is a 75 year old male with a history of s/p COVID 19 infection 01/2021, asthma, allergy, BPH, overweight, GERD, and OSAS. His number one issue today is still nocturia (still).\par \par His shortness of breath is multifactorial due to:\par -poor mechanics of breathing \par -out of shape / overweight\par -pulmonary disease -(asthma)\par \par problem 1: asthma -(stable)\par -continue Singulair 10 mg QHS \par -continue Breo Ellipta 200 at 1 inhalation QD\par -continue Ventolin PRN and before exercise\par -Asthma is  believed to be caused by inherited (genetic) and environmental factor, but its exact cause is unknown. Asthma may be triggered by allergens, lung infections, or irritants in the air. Asthma triggers are different for each person\par -Inhaler technique reviewed as well as oral hygiene techniques reviewed with patient. Avoidance of cold air, extremes of temperature, rescue inhaler should be used before exercise. Order of medication reviewed with patient. 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 \par -For treatment of reflux, possible options discussed including diet control, H2 blockers, PPIs, as well as coating motility agents discussed as treatment options. Timing of meals and proximity of last meal to sleep were discussed. If symptoms persist, a formal gastrointestinal evaluation is needed.\par \par problem 5: overweight\par -Weight loss, exercise, and diet control were discussed and are highly encouraged. Treatment options were given such as, aqua therapy, and contacting a nutritionist. Recommended to use the elliptical, stationary bike, less use of treadmill.  Obesity is associated with worsening asthma, shortness of breath, and potential for cardiac disease, diabetes, and other underlying medical conditions.\par \par problem 6:  SIOBHAN\par -continue to use the oral appliance\par -repeat the sleep study with the oral appliance in place \par -Sleep apnea is associated with adverse clinical consequences which an affect most organ systems.  Cardiovascular disease risk includes arrhythmias, atrial fibrillation, hypertension, coronary artery disease, and stroke. Metabolic disorders include diabetes type 2, non-alcoholic fatty liver disease. Mood disorder especially depression; and cognitive decline especially in the elderly. Associations with  chronic reflux/Guadarrama’s esophagus some but not all inclusive. \par -Reasons to assess this include arousal consistent with hypopnea; respiratory events most prominent in REM sleep or supine position; therefore sleep staging and body position are important for accurate diagnosis and estimation of AHI.\par \par problem 7: poor breathing mechanics\par -Proper breathing techniques were reviewed with an emphasis of exhalation. Patient instructed to breath in for 1 second and out for four seconds. Patient was encouraged to not talk while walking.\par \par Problem 8: Health Maintenance/COVID19 Precautions:\par s/p COVID infection 01/2021\par -COVID vaccine d/w pt- await J&J\par - Clean your hands often. Wash your hands often with soap and water for at least 20 seconds, especially after blowing your nose, coughing, or sneezing, or having been in a public place.\par - If soap and water are not available, use a hand  that contains at least 60% alcohol.\par - To the extent possible, avoid touching high-touch surfaces in public places - elevator buttons, door handles, handrails, handshaking with people, etc. Use a tissue or your sleeve to cover your hand or finger if you must touch something.\par - Wash your hands after touching surfaces in public places.\par - Avoid touching your face, nose, eyes, etc.\par - Clean and disinfect your home to remove germs: practice routine cleaning of frequently touched surfaces (for example: tables, doorknobs, light switches, handles, desks, toilets, faucets, sinks & cell phones)\par - Avoid crowds, especially in poorly ventilated spaces. Your risk of exposure to respiratory viruses like COVID-19 may increase in crowded, closed-in settings with little air circulation if\par there are people in the crowd who are sick. All patients are recommended to practice social distancing and stay at least 6 feet away from others.\par - Avoid all non-essential travel including plane trips, and especially avoid embarking on cruise ships.\par -If COVID-19 is spreading in your community, take extra measures to put distance between yourself and other people to further reduce your risk of being exposed to this new virus.\par -Stay home as much as possible.\par - Consider ways of getting food brought to your house through family, social, or commercial networks\par -Be aware that the virus has been known to live in the air up to 3 hours post exposure, cardboard up to 24 hours post exposure, copper up to 4 hours post exposure, steel and plastic up to 2-3 days post exposure. Risk of transmission from these surfaces are affected by many variables.\par \par Immune Support Recommendations:\par -OTC Vitamin C 500mg BID \par -OTC Quercetin 250-500mg BID \par -OTC Zinc 75-100mg per day \par -OTC Melatonin 1or 2mg a night \par -OTC Vitamin D 1-4000mg per day\par -Tonic Water 8oz\par -Recommended to Stay Hydrated (At least a gallon/day)\par \par Asthma and COVID19:\par You need to make sure your asthma is under control. This often requires the use of inhaled corticosteroids (and sometimes oral corticosteroids). Inhaled corticosteroids do not likely reduce your immune system’s ability to fight infections, but oral corticosteroids may. It is important to use the steps above to protect yourself to limit your exposure to any respiratory virus. \par \par problem 9: health maintenance \par -Recommended Cialis 5 mg QHS for nocturia (Steckel)\par -Continue Flomax 4 mg BID\par -He refused an influenza vaccine 2018 / 2019\par -recommended strep pneumonia vaccines: Prevnar-13 vaccine, followed by Pneumo vaccine 23 one year following\par -recommended early intervention for URIs\par -recommended regular osteoporosis evaluations\par -recommended early dermatological evaluations\par -recommended after the age of 50 to the age of 70, colonoscopy every 5 years \par \par F/U in 4 months with SPI and NiOx\par He is encouraged to call with any changes, concerns, or questions.

## 2021-04-08 ENCOUNTER — RX RENEWAL (OUTPATIENT)
Age: 76
End: 2021-04-08

## 2021-06-09 ENCOUNTER — APPOINTMENT (OUTPATIENT)
Dept: RADIOLOGY | Facility: CLINIC | Age: 76
End: 2021-06-09
Payer: MEDICARE

## 2021-06-09 ENCOUNTER — APPOINTMENT (OUTPATIENT)
Dept: ULTRASOUND IMAGING | Facility: IMAGING CENTER | Age: 76
End: 2021-06-09
Payer: MEDICARE

## 2021-06-09 ENCOUNTER — OUTPATIENT (OUTPATIENT)
Dept: OUTPATIENT SERVICES | Facility: HOSPITAL | Age: 76
LOS: 1 days | End: 2021-06-09

## 2021-06-09 ENCOUNTER — OUTPATIENT (OUTPATIENT)
Dept: OUTPATIENT SERVICES | Facility: HOSPITAL | Age: 76
LOS: 1 days | End: 2021-06-09
Payer: MEDICARE

## 2021-06-09 DIAGNOSIS — Z00.8 ENCOUNTER FOR OTHER GENERAL EXAMINATION: ICD-10-CM

## 2021-06-09 DIAGNOSIS — Z00.00 ENCOUNTER FOR GENERAL ADULT MEDICAL EXAMINATION WITHOUT ABNORMAL FINDINGS: ICD-10-CM

## 2021-06-09 PROCEDURE — 73610 X-RAY EXAM OF ANKLE: CPT

## 2021-06-09 PROCEDURE — 73610 X-RAY EXAM OF ANKLE: CPT | Mod: 26,LT

## 2021-06-09 PROCEDURE — 93970 EXTREMITY STUDY: CPT

## 2021-06-09 PROCEDURE — 93970 EXTREMITY STUDY: CPT | Mod: 26

## 2021-06-21 ENCOUNTER — RX RENEWAL (OUTPATIENT)
Age: 76
End: 2021-06-21

## 2021-07-13 ENCOUNTER — RX RENEWAL (OUTPATIENT)
Age: 76
End: 2021-07-13

## 2021-09-13 NOTE — ADDENDUM
I did speak to patient on the phone. He will speak to the chiropractor office to determine if services are covered under his insurance   [FreeTextEntry1] : Documented by Timothy Walker acting as a scribe for Dr. Aris Shields on 07/15/2020 \par \par All medical record entries made by the Scribe were at my, Dr. Aris Shields's, direction and personally dictated by me on 07/15/2020 . I have reviewed the chart and agree that the record accurately reflects my personal performance of the history, physical exam, assessment and plan. I have also personally directed, reviewed, and agree with the discharge instructions. \par

## 2021-10-06 ENCOUNTER — APPOINTMENT (OUTPATIENT)
Dept: PULMONOLOGY | Facility: CLINIC | Age: 76
End: 2021-10-06
Payer: MEDICARE

## 2021-10-06 VITALS
RESPIRATION RATE: 17 BRPM | HEIGHT: 68 IN | DIASTOLIC BLOOD PRESSURE: 70 MMHG | WEIGHT: 187 LBS | TEMPERATURE: 97.2 F | OXYGEN SATURATION: 98 % | HEART RATE: 73 BPM | BODY MASS INDEX: 28.34 KG/M2 | SYSTOLIC BLOOD PRESSURE: 120 MMHG

## 2021-10-06 PROCEDURE — 71046 X-RAY EXAM CHEST 2 VIEWS: CPT

## 2021-10-06 PROCEDURE — 99214 OFFICE O/P EST MOD 30 MIN: CPT | Mod: CS,25

## 2021-10-06 RX ORDER — AZITHROMYCIN 250 MG/1
250 TABLET, FILM COATED ORAL
Qty: 1 | Refills: 1 | Status: DISCONTINUED | COMMUNITY
Start: 2021-09-07 | End: 2021-10-06

## 2021-10-06 RX ORDER — CEPHALEXIN 500 MG/1
500 CAPSULE ORAL 3 TIMES DAILY
Qty: 21 | Refills: 0 | Status: DISCONTINUED | COMMUNITY
Start: 2021-06-20 | End: 2021-10-06

## 2021-10-06 RX ORDER — AZITHROMYCIN 250 MG/1
250 TABLET, FILM COATED ORAL
Qty: 1 | Refills: 1 | Status: DISCONTINUED | COMMUNITY
Start: 2021-01-04 | End: 2021-10-06

## 2021-10-06 RX ORDER — CEPHALEXIN 500 MG/1
500 CAPSULE ORAL
Qty: 20 | Refills: 0 | Status: DISCONTINUED | COMMUNITY
Start: 2021-06-28 | End: 2021-10-06

## 2021-10-06 NOTE — ASSESSMENT
[FreeTextEntry1] : Mr. Amato is a 76 year old male with a history of s/p COVID 19 infection 01/2021, asthma, allergy, BPH, overweight, GERD, and OSAS. His number one issue today is active asthma (non compliance)\par \par His shortness of breath is multifactorial due to:\par -poor mechanics of breathing \par -out of shape / overweight\par -pulmonary disease -(asthma)\par \par problem 1: asthma -(active)\par -Add Prednisone 20mg for 7 days then 10mg for 7 days\par -Information sheet given to the patient to be reviewed, this medication is never to be used without consulting the prescribing physician. Proper dietary restraint is necessary specifically salt containing foods, if any reaction may occur should be reported. \par \par -continue Singulair 10 mg QHS \par -continue Breo Ellipta 200 at 1 inhalation QD\par -continue Ventolin PRN and before exercise\par -Asthma is  believed to be caused by inherited (genetic) and environmental factor, but its exact cause is unknown. Asthma may be triggered by allergens, lung infections, or irritants in the air. Asthma triggers are different for each person\par -Inhaler technique reviewed as well as oral hygiene techniques reviewed with patient. Avoidance of cold air, extremes of temperature, rescue inhaler should be used before exercise. Order of medication reviewed with patient. 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 \par -For treatment of reflux, possible options discussed including diet control, H2 blockers, PPIs, as well as coating motility agents discussed as treatment options. Timing of meals and proximity of last meal to sleep were discussed. If symptoms persist, a formal gastrointestinal evaluation is needed.\par \par problem 5: overweight\par -Weight loss, exercise, and diet control were discussed and are highly encouraged. Treatment options were given such as, aqua therapy, and contacting a nutritionist. Recommended to use the elliptical, stationary bike, less use of treadmill.  Obesity is associated with worsening asthma, shortness of breath, and potential for cardiac disease, diabetes, and other underlying medical conditions.\par \par problem 6:  SIOBHAN\par -continue to use the oral appliance\par -repeat the sleep study with the oral appliance in place \par -Sleep apnea is associated with adverse clinical consequences which an affect most organ systems.  Cardiovascular disease risk includes arrhythmias, atrial fibrillation, hypertension, coronary artery disease, and stroke. Metabolic disorders include diabetes type 2, non-alcoholic fatty liver disease. Mood disorder especially depression; and cognitive decline especially in the elderly. Associations with  chronic reflux/[place]ama’s esophagus some but not all inclusive. \par -Reasons to assess this include arousal consistent with hypopnea; respiratory events most prominent in REM sleep or supine position; therefore sleep staging and body position are important for accurate diagnosis and estimation of AHI.\par \par problem 7: poor breathing mechanics\par -Proper breathing techniques were reviewed with an emphasis of exhalation. Patient instructed to breath in for 1 second and out for four seconds. Patient was encouraged to not talk while walking.\par \par Problem 8: Health Maintenance/COVID19 Precautions:\par s/p COVID infection 01/2021\par -COVID vaccine d/w pt- await J&J\par - Clean your hands often. Wash your hands often with soap and water for at least 20 seconds, especially after blowing your nose, coughing, or sneezing, or having been in a public place.\par - If soap and water are not available, use a hand  that contains at least 60% alcohol.\par - To the extent possible, avoid touching high-touch surfaces in public places - elevator buttons, door handles, handrails, handshaking with people, etc. Use a tissue or your sleeve to cover your hand or finger if you must touch something.\par - Wash your hands after touching surfaces in public places.\par - Avoid touching your face, nose, eyes, etc.\par - Clean and disinfect your home to remove germs: practice routine cleaning of frequently touched surfaces (for example: tables, doorknobs, light switches, handles, desks, toilets, faucets, sinks & cell phones)\par - Avoid crowds, especially in poorly ventilated spaces. Your risk of exposure to respiratory viruses like COVID-19 may increase in crowded, closed-in settings with little air circulation if\par there are people in the crowd who are sick. All patients are recommended to practice social distancing and stay at least 6 feet away from others.\par - Avoid all non-essential travel including plane trips, and especially avoid embarking on cruise ships.\par -If COVID-19 is spreading in your community, take extra measures to put distance between yourself and other people to further reduce your risk of being exposed to this new virus.\par -Stay home as much as possible.\par - Consider ways of getting food brought to your house through family, social, or commercial networks\par -Be aware that the virus has been known to live in the air up to 3 hours post exposure, cardboard up to 24 hours post exposure, copper up to 4 hours post exposure, steel and plastic up to 2-3 days post exposure. Risk of transmission from these surfaces are affected by many variables.\par \par Immune Support Recommendations:\par -OTC Vitamin C 500mg BID \par -OTC Quercetin 250-500mg BID \par -OTC Zinc 75-100mg per day \par -OTC Melatonin 1or 2mg a night \par -OTC Vitamin D 1-4000mg per day\par -Tonic Water 8oz\par -Recommended to Stay Hydrated (At least a gallon/day)\par \par Asthma and COVID19:\par You need to make sure your asthma is under control. This often requires the use of inhaled corticosteroids (and sometimes oral corticosteroids). Inhaled corticosteroids do not likely reduce your immune system’s ability to fight infections, but oral corticosteroids may. It is important to use the steps above to protect yourself to limit your exposure to any respiratory virus. \par \par problem 9: health maintenance \par -Recommended Cialis 5 mg QHS for nocturia (Steckel)\par -Continue Flomax 4 mg BID\par -He refused an influenza vaccine 2018 / 2019 / 2020\par -recommended strep pneumonia vaccines: Prevnar-13 vaccine, followed by Pneumo vaccine 23 one year following\par -recommended early intervention for URIs\par -recommended regular osteoporosis evaluations\par -recommended early dermatological evaluations\par -recommended after the age of 50 to the age of 70, colonoscopy every 5 years \par \par F/U in 4 months with SPI and NiOx\par He is encouraged to call with any changes, concerns, or questions.

## 2021-10-06 NOTE — REASON FOR VISIT
[Follow-Up] : a follow-up visit [Spouse] : spouse [FreeTextEntry1] : allergies, asthma, GERD, SIOBHAN, overweight, snoring, and SOB

## 2021-10-06 NOTE — ADDENDUM
[FreeTextEntry1] : Documented by Alberto Whipple acting as a scribe for Dr. Aris Shields on (10/06/2021).\par \par All medical record entries made by the Scribe were at my, Dr. Aris Shields's, direction and personally dictated by me on (10/06/2021). I have reviewed the chart and agree that the record accurately reflects my personal performance of the history, physical exam, assessment and plan. I have also personally directed, reviewed, and agree with the discharge instructions.\par

## 2021-10-06 NOTE — HISTORY OF PRESENT ILLNESS
[FreeTextEntry1] : Mr. Amato is a 76 year old male presenting to the office today for a follow up visit for allergies, asthma, GERD, SIOBHAN, overweight, snoring, and SOB. His chief complaint is \par -he notes feeling a sensation in his chest/throat which makes him cough up green phlegm\par -he notes this happens in the morning and night\par -he notes losing 2 lbs recently because he is not eating much\par -he notes eating also makes him cough sometimes\par -he notes the onset of these Sx are one month ago\par -he denies getting enough sleep because the cough interrupts it\par -he notes the sputum is not colored now\par -he notes sometimes drinking water makes him cough\par -he notes his sinuses drip down at night\par -he notes taking Singulair and allegra\par -he notes he was fine for a few years prior to this\par -he denies getting any COVID-19 vaccine because he previously had a COVID infection\par \par patient denies any headaches, nausea, vomiting, fever, chills, sweats, chest pain, chest pressure, palpitations, wheezing, fatigue, diarrhea, constipation, dysphagia, myalgias, dizziness, leg swelling, leg pain, itchy eyes, itchy ears, heartburn, reflux or sour taste in the mouth

## 2021-10-06 NOTE — PROCEDURE
[FreeTextEntry1] : CXR revealed a normal sized heart; there was no evidence of infiltrate or effusion. Scoliosis to the right and air beneath the right hemidiaphragm\par \par -Images and procedures reviewed in detail and discussed with patient.

## 2022-01-10 ENCOUNTER — RX RENEWAL (OUTPATIENT)
Age: 77
End: 2022-01-10

## 2022-01-23 RX ORDER — ALBUTEROL SULFATE 90 UG/1
108 (90 BASE) AEROSOL, METERED RESPIRATORY (INHALATION)
Qty: 1 | Refills: 3 | Status: ACTIVE | COMMUNITY
Start: 2022-01-23 | End: 1900-01-01

## 2022-01-24 ENCOUNTER — INPATIENT (INPATIENT)
Facility: HOSPITAL | Age: 77
LOS: 3 days | Discharge: HOME | DRG: 175 | End: 2022-01-28
Attending: INTERNAL MEDICINE | Admitting: INTERNAL MEDICINE
Payer: MEDICARE

## 2022-01-24 VITALS
DIASTOLIC BLOOD PRESSURE: 73 MMHG | TEMPERATURE: 98 F | WEIGHT: 184.97 LBS | HEART RATE: 100 BPM | HEIGHT: 69 IN | OXYGEN SATURATION: 100 % | SYSTOLIC BLOOD PRESSURE: 113 MMHG | RESPIRATION RATE: 20 BRPM

## 2022-01-24 DIAGNOSIS — J45.909 UNSPECIFIED ASTHMA, UNCOMPLICATED: ICD-10-CM

## 2022-01-24 DIAGNOSIS — Z29.9 ENCOUNTER FOR PROPHYLACTIC MEASURES, UNSPECIFIED: ICD-10-CM

## 2022-01-24 DIAGNOSIS — E78.5 HYPERLIPIDEMIA, UNSPECIFIED: ICD-10-CM

## 2022-01-24 DIAGNOSIS — I10 ESSENTIAL (PRIMARY) HYPERTENSION: ICD-10-CM

## 2022-01-24 DIAGNOSIS — N17.9 ACUTE KIDNEY FAILURE, UNSPECIFIED: ICD-10-CM

## 2022-01-24 DIAGNOSIS — N40.0 BENIGN PROSTATIC HYPERPLASIA WITHOUT LOWER URINARY TRACT SYMPTOMS: ICD-10-CM

## 2022-01-24 DIAGNOSIS — R09.89 OTHER SPECIFIED SYMPTOMS AND SIGNS INVOLVING THE CIRCULATORY AND RESPIRATORY SYSTEMS: ICD-10-CM

## 2022-01-24 DIAGNOSIS — R06.00 DYSPNEA, UNSPECIFIED: ICD-10-CM

## 2022-01-24 DIAGNOSIS — I26.99 OTHER PULMONARY EMBOLISM WITHOUT ACUTE COR PULMONALE: ICD-10-CM

## 2022-01-24 LAB
ALBUMIN SERPL ELPH-MCNC: 4.3 G/DL — SIGNIFICANT CHANGE UP (ref 3.3–5)
ALP SERPL-CCNC: 109 U/L — SIGNIFICANT CHANGE UP (ref 40–120)
ALT FLD-CCNC: 14 U/L — SIGNIFICANT CHANGE UP (ref 10–45)
ANION GAP SERPL CALC-SCNC: 16 MMOL/L — SIGNIFICANT CHANGE UP (ref 5–17)
APTT BLD: 104.2 SEC — HIGH (ref 27.5–35.5)
APTT BLD: 27.6 SEC — SIGNIFICANT CHANGE UP (ref 27.5–35.5)
AST SERPL-CCNC: 17 U/L — SIGNIFICANT CHANGE UP (ref 10–40)
BASE EXCESS BLDV CALC-SCNC: -0.6 MMOL/L — SIGNIFICANT CHANGE UP (ref -2–2)
BASOPHILS # BLD AUTO: 0.02 K/UL — SIGNIFICANT CHANGE UP (ref 0–0.2)
BASOPHILS NFR BLD AUTO: 0.3 % — SIGNIFICANT CHANGE UP (ref 0–2)
BILIRUB SERPL-MCNC: 0.6 MG/DL — SIGNIFICANT CHANGE UP (ref 0.2–1.2)
BUN SERPL-MCNC: 34 MG/DL — HIGH (ref 7–23)
CA-I SERPL-SCNC: 1.26 MMOL/L — SIGNIFICANT CHANGE UP (ref 1.15–1.33)
CALCIUM SERPL-MCNC: 9.8 MG/DL — SIGNIFICANT CHANGE UP (ref 8.4–10.5)
CHLORIDE BLDV-SCNC: 108 MMOL/L — SIGNIFICANT CHANGE UP (ref 96–108)
CHLORIDE SERPL-SCNC: 107 MMOL/L — SIGNIFICANT CHANGE UP (ref 96–108)
CO2 BLDV-SCNC: 26 MMOL/L — SIGNIFICANT CHANGE UP (ref 22–26)
CO2 SERPL-SCNC: 21 MMOL/L — LOW (ref 22–31)
CREAT SERPL-MCNC: 1.71 MG/DL — HIGH (ref 0.5–1.3)
EOSINOPHIL # BLD AUTO: 0.1 K/UL — SIGNIFICANT CHANGE UP (ref 0–0.5)
EOSINOPHIL NFR BLD AUTO: 1.4 % — SIGNIFICANT CHANGE UP (ref 0–6)
GAS PNL BLDV: 140 MMOL/L — SIGNIFICANT CHANGE UP (ref 136–145)
GAS PNL BLDV: SIGNIFICANT CHANGE UP
GAS PNL BLDV: SIGNIFICANT CHANGE UP
GLUCOSE BLDV-MCNC: 115 MG/DL — HIGH (ref 70–99)
GLUCOSE SERPL-MCNC: 117 MG/DL — HIGH (ref 70–99)
HCO3 BLDV-SCNC: 25 MMOL/L — SIGNIFICANT CHANGE UP (ref 22–29)
HCT VFR BLD CALC: 42.2 % — SIGNIFICANT CHANGE UP (ref 39–50)
HCT VFR BLDA CALC: 43 % — SIGNIFICANT CHANGE UP (ref 39–51)
HGB BLD CALC-MCNC: 14.3 G/DL — SIGNIFICANT CHANGE UP (ref 12.6–17.4)
HGB BLD-MCNC: 13.9 G/DL — SIGNIFICANT CHANGE UP (ref 13–17)
IMM GRANULOCYTES NFR BLD AUTO: 0.6 % — SIGNIFICANT CHANGE UP (ref 0–1.5)
INR BLD: 1.25 RATIO — HIGH (ref 0.88–1.16)
INR BLD: 1.25 RATIO — HIGH (ref 0.88–1.16)
LACTATE BLDV-MCNC: 1.7 MMOL/L — SIGNIFICANT CHANGE UP (ref 0.7–2)
LYMPHOCYTES # BLD AUTO: 1.04 K/UL — SIGNIFICANT CHANGE UP (ref 1–3.3)
LYMPHOCYTES # BLD AUTO: 14.5 % — SIGNIFICANT CHANGE UP (ref 13–44)
MAGNESIUM SERPL-MCNC: 1.9 MG/DL — SIGNIFICANT CHANGE UP (ref 1.6–2.6)
MCHC RBC-ENTMCNC: 30 PG — SIGNIFICANT CHANGE UP (ref 27–34)
MCHC RBC-ENTMCNC: 32.9 GM/DL — SIGNIFICANT CHANGE UP (ref 32–36)
MCV RBC AUTO: 91.1 FL — SIGNIFICANT CHANGE UP (ref 80–100)
MONOCYTES # BLD AUTO: 0.7 K/UL — SIGNIFICANT CHANGE UP (ref 0–0.9)
MONOCYTES NFR BLD AUTO: 9.8 % — SIGNIFICANT CHANGE UP (ref 2–14)
NEUTROPHILS # BLD AUTO: 5.26 K/UL — SIGNIFICANT CHANGE UP (ref 1.8–7.4)
NEUTROPHILS NFR BLD AUTO: 73.4 % — SIGNIFICANT CHANGE UP (ref 43–77)
NRBC # BLD: 0 /100 WBCS — SIGNIFICANT CHANGE UP (ref 0–0)
NT-PROBNP SERPL-SCNC: 2729 PG/ML — HIGH (ref 0–300)
PCO2 BLDV: 43 MMHG — SIGNIFICANT CHANGE UP (ref 42–55)
PH BLDV: 7.37 — SIGNIFICANT CHANGE UP (ref 7.32–7.43)
PLATELET # BLD AUTO: 207 K/UL — SIGNIFICANT CHANGE UP (ref 150–400)
PO2 BLDV: 40 MMHG — SIGNIFICANT CHANGE UP (ref 25–45)
POTASSIUM BLDV-SCNC: 4.3 MMOL/L — SIGNIFICANT CHANGE UP (ref 3.5–5.1)
POTASSIUM SERPL-MCNC: 4.3 MMOL/L — SIGNIFICANT CHANGE UP (ref 3.5–5.3)
POTASSIUM SERPL-SCNC: 4.3 MMOL/L — SIGNIFICANT CHANGE UP (ref 3.5–5.3)
PROT SERPL-MCNC: 7.1 G/DL — SIGNIFICANT CHANGE UP (ref 6–8.3)
PROTHROM AB SERPL-ACNC: 14.8 SEC — HIGH (ref 10.6–13.6)
PROTHROM AB SERPL-ACNC: 14.8 SEC — HIGH (ref 10.6–13.6)
RBC # BLD: 4.63 M/UL — SIGNIFICANT CHANGE UP (ref 4.2–5.8)
RBC # FLD: 12.1 % — SIGNIFICANT CHANGE UP (ref 10.3–14.5)
SAO2 % BLDV: 62.4 % — LOW (ref 67–88)
SARS-COV-2 RNA SPEC QL NAA+PROBE: SIGNIFICANT CHANGE UP
SODIUM SERPL-SCNC: 144 MMOL/L — SIGNIFICANT CHANGE UP (ref 135–145)
TROPONIN T, HIGH SENSITIVITY RESULT: 80 NG/L — HIGH (ref 0–51)
TROPONIN T, HIGH SENSITIVITY RESULT: 93 NG/L — HIGH (ref 0–51)
WBC # BLD: 7.16 K/UL — SIGNIFICANT CHANGE UP (ref 3.8–10.5)
WBC # FLD AUTO: 7.16 K/UL — SIGNIFICANT CHANGE UP (ref 3.8–10.5)

## 2022-01-24 PROCEDURE — 71045 X-RAY EXAM CHEST 1 VIEW: CPT | Mod: 26

## 2022-01-24 PROCEDURE — 99291 CRITICAL CARE FIRST HOUR: CPT | Mod: FS,CS

## 2022-01-24 PROCEDURE — 93306 TTE W/DOPPLER COMPLETE: CPT | Mod: 26

## 2022-01-24 PROCEDURE — 93010 ELECTROCARDIOGRAM REPORT: CPT

## 2022-01-24 PROCEDURE — 99223 1ST HOSP IP/OBS HIGH 75: CPT

## 2022-01-24 RX ORDER — HEPARIN SODIUM 5000 [USP'U]/ML
1400 INJECTION INTRAVENOUS; SUBCUTANEOUS
Qty: 25000 | Refills: 0 | Status: DISCONTINUED | OUTPATIENT
Start: 2022-01-24 | End: 2022-01-27

## 2022-01-24 RX ORDER — FLUTICASONE FUROATE AND VILANTEROL TRIFENATATE 100; 25 UG/1; UG/1
1 POWDER RESPIRATORY (INHALATION)
Qty: 0 | Refills: 0 | DISCHARGE

## 2022-01-24 RX ORDER — HEPARIN SODIUM 5000 [USP'U]/ML
6500 INJECTION INTRAVENOUS; SUBCUTANEOUS EVERY 6 HOURS
Refills: 0 | Status: DISCONTINUED | OUTPATIENT
Start: 2022-01-24 | End: 2022-01-24

## 2022-01-24 RX ORDER — CHLORHEXIDINE GLUCONATE 213 G/1000ML
1 SOLUTION TOPICAL DAILY
Refills: 0 | Status: DISCONTINUED | OUTPATIENT
Start: 2022-01-24 | End: 2022-01-25

## 2022-01-24 RX ORDER — ALBUTEROL 90 UG/1
2 AEROSOL, METERED ORAL
Qty: 0 | Refills: 0 | DISCHARGE

## 2022-01-24 RX ORDER — HEPARIN SODIUM 5000 [USP'U]/ML
3000 INJECTION INTRAVENOUS; SUBCUTANEOUS EVERY 6 HOURS
Refills: 0 | Status: DISCONTINUED | OUTPATIENT
Start: 2022-01-24 | End: 2022-01-24

## 2022-01-24 RX ORDER — TAMSULOSIN HYDROCHLORIDE 0.4 MG/1
1 CAPSULE ORAL
Qty: 0 | Refills: 0 | DISCHARGE

## 2022-01-24 RX ORDER — ATORVASTATIN CALCIUM 80 MG/1
1 TABLET, FILM COATED ORAL
Qty: 0 | Refills: 0 | DISCHARGE

## 2022-01-24 RX ORDER — ATORVASTATIN CALCIUM 80 MG/1
40 TABLET, FILM COATED ORAL AT BEDTIME
Refills: 0 | Status: DISCONTINUED | OUTPATIENT
Start: 2022-01-24 | End: 2022-01-28

## 2022-01-24 RX ORDER — INFLUENZA VIRUS VACCINE 15; 15; 15; 15 UG/.5ML; UG/.5ML; UG/.5ML; UG/.5ML
0.7 SUSPENSION INTRAMUSCULAR ONCE
Refills: 0 | Status: DISCONTINUED | OUTPATIENT
Start: 2022-01-24 | End: 2022-01-28

## 2022-01-24 RX ORDER — MONTELUKAST 4 MG/1
1 TABLET, CHEWABLE ORAL
Qty: 0 | Refills: 0 | DISCHARGE

## 2022-01-24 RX ORDER — ASPIRIN/CALCIUM CARB/MAGNESIUM 324 MG
324 TABLET ORAL ONCE
Refills: 0 | Status: COMPLETED | OUTPATIENT
Start: 2022-01-24 | End: 2022-01-24

## 2022-01-24 RX ORDER — BUDESONIDE AND FORMOTEROL FUMARATE DIHYDRATE 160; 4.5 UG/1; UG/1
2 AEROSOL RESPIRATORY (INHALATION)
Refills: 0 | Status: DISCONTINUED | OUTPATIENT
Start: 2022-01-24 | End: 2022-01-28

## 2022-01-24 RX ORDER — HEPARIN SODIUM 5000 [USP'U]/ML
INJECTION INTRAVENOUS; SUBCUTANEOUS
Qty: 25000 | Refills: 0 | Status: DISCONTINUED | OUTPATIENT
Start: 2022-01-24 | End: 2022-01-24

## 2022-01-24 RX ORDER — TAMSULOSIN HYDROCHLORIDE 0.4 MG/1
0.4 CAPSULE ORAL DAILY
Refills: 0 | Status: DISCONTINUED | OUTPATIENT
Start: 2022-01-24 | End: 2022-01-28

## 2022-01-24 RX ORDER — HEPARIN SODIUM 5000 [USP'U]/ML
6500 INJECTION INTRAVENOUS; SUBCUTANEOUS ONCE
Refills: 0 | Status: COMPLETED | OUTPATIENT
Start: 2022-01-24 | End: 2022-01-24

## 2022-01-24 RX ORDER — MONTELUKAST 4 MG/1
10 TABLET, CHEWABLE ORAL DAILY
Refills: 0 | Status: DISCONTINUED | OUTPATIENT
Start: 2022-01-25 | End: 2022-01-28

## 2022-01-24 RX ADMIN — HEPARIN SODIUM 1500 UNIT(S)/HR: 5000 INJECTION INTRAVENOUS; SUBCUTANEOUS at 17:00

## 2022-01-24 RX ADMIN — Medication 324 MILLIGRAM(S): at 16:33

## 2022-01-24 RX ADMIN — HEPARIN SODIUM 6500 UNIT(S): 5000 INJECTION INTRAVENOUS; SUBCUTANEOUS at 17:01

## 2022-01-24 NOTE — ED PROVIDER NOTE - CRITICAL CARE ATTENDING CONTRIBUTION TO CARE
I saw patient with ACP.     RGUJRAL 77yo male hx listed presents with exertional SOB x 1 week. States with minimal activity or walking patient has been experiencing shortness of breath. Denies chest pain, palp. +Dizziness. Pt endorses a cough x 2 weeks with clear sputum. No n/v/d, abd pain, f/c. No recent travel. Pt is not vaccinated for COVID, no known exposure. No dark stools. Pt seen by PCP today and sent in with abnormal EKG. Last stress test many years ago.  On exam, Patient is awake,alert,oriented x 3. Patient is well appearing and in no acute distress. Patient's chest is clear to ausculation, +s1s2. Abdomen is soft nd/nt +BS. Extremity with no swelling or calf tenderness.   EKG reviewed, no chest pain at this time.  Check labs, Xray chest, eval for ACS, PE, Anemia, PNA/COVID. Pt HD stable, continue to monitor.    Patient's DDimer noted to be elevated, with elevated creatinine. Obtain stat ECHO to eval for RV strain, start patient on heparin gtt. No hx bleeding or contraindication to AC. Pt HD stable. Pt signed out to Dr. Spivey.

## 2022-01-24 NOTE — ED PROVIDER NOTE - PROGRESS NOTE DETAILS
Pt found to have elevated dimer of [address] with trop 93, Cr 1.71 and BNP 2729. Pt son in law Dr. Molina discussed with Cardiology Dr. Alejo who will consult from Cardiology perspective on patient. Dr. Alejo requests admission to Dr. Sandeep Crespo. Spoke to Dr. Crespo who is aware of patient and results. High suspicion for PE but cannot CTA with current renal function. Dr Crespo recommends TTE, b/l LE doppler and VQ scan and advises to start heparin full a/c in meantime. Pt TBA Dr. Crespo under tele   Janine Young PA-C Pt found to have elevated dimer of 256[address] with trop 93, Cr 1.71 and BNP 2729. Pt son in law Dr. Molina discussed with Cardiology Dr. Alejo who will consult from Cardiology perspective on patient. Dr. Alejo requests admission to Dr. Sandeep Crespo. Spoke to Dr. Crespo who is aware of patient and results. High suspicion for PE decision to not CTA with current renal function. Dr Crespo recommends TTE, b/l LE doppler and VQ scan and advises to start heparin full a/c in meantime. Pt TBA Dr. Crespo under tele   Janine Young PA-C Spoke to Dr. Crespo again in regards to potential for PERT consult given elevated trop and BNP with high suspicion of PE. Dr. Crespo agrees PERT consult is appropriate. Cardiology fellow at bedside and will discuss with vascular fellow and provide further recommendations  Janine Young PA-C Pt seen and evaluated by Vascular fellow. TTE performed at bed side revealing Carr sign and RV strain consistent with likely diagnosis of PE. After eval Cardiology states will accept to CCU under Dr. Holguin. Spoke to Dr. Crespo and informed of recommendation and hospitalist who wrote H&P. Will change admission now  Janine Young PA-C

## 2022-01-24 NOTE — H&P ADULT - HISTORY OF PRESENT ILLNESS
CC: 75 y/o M with new onset exertional dyspnea for 1 week    HPI: 75 y/o M with h/o asthma, HTN, HLD, presenting with 1 week of worsening MARTINEZ. Over the past week, patient has noticed new onset MARTINEZ while walking his dog or going up the two flights of stairs in his house, with associated dizziness/lightheadedness. Previously no limit on ET. Denies cp/LE edema. Reports + cough for the past 2 weeks however c/w seasonal cough for him. Presented to his cardiologist today who noted abnormal ekg compared to prior so sent to ED. Of note, patient with natural; Covid infection 1/2021, not vaccinated post. Had rhinorrhea + new onset cough in end of december which self resolved.     In ED afeb hds, satting well on RA. Labs notable for Cr 1.7, Trop 93, BNP 2K, D-dimer 10K. EKG with new onset TWI v2-v3. No signs of RHS on EKG. Cardiology, PCP, and PERT team consulted. Started on hep gtt.

## 2022-01-24 NOTE — H&P ADULT - PROBLEM SELECTOR PLAN 2
possibly 2/2 to poor perfusion iso PE vs hypovolemia.   -on hep gtt  -f/u bladder scan  -encouraged po intake  -trend cr, if does not improve, will send urine lytes/na/osm and renal u/s possibly 2/2 to poor perfusion iso PE vs hypovolemia. Possible obstructive given bph  -on hep gtt  -f/u bladder scan  -encouraged po intake  -trend cr, if does not improve, will send urine lytes/na/osm and renal u/s

## 2022-01-24 NOTE — ED PROVIDER NOTE - ATTENDING CONTRIBUTION TO CARE
RGUJRAL 75yo male hx listed presents with exertional SOB x 1 week. States with minimal activity or walking patient has been experiencing shortness of breath. Denies chest pain, palp. +Dizziness. Pt endorses a cough x 2 weeks with clear sputum. No n/v/d, abd pain, f/c. No recent travel. Pt is not vaccinated for COVID, no known exposure. No dark stools. Pt seen by PCP today and sent in with abnormal EKG. Last stress test many years ago.  On exam, Patient is awake,alert,oriented x 3. Patient is well appearing and in no acute distress. Patient's chest is clear to ausculation, +s1s2. Abdomen is soft nd/nt +BS. Extremity with no swelling or calf tenderness.   EKG reviewed, no chest pain at this time.  Check labs, Xray chest, eval for ACS, Anemia, PNA/COVID. Pt HD stable, continue to monitor.

## 2022-01-24 NOTE — CONSULT NOTE ADULT - ATTENDING COMMENTS
He has normal BP and HR, with normal O2 sat on room air  Continue heparin gtt for now  Given unprovoked event (covid was over a year ago), would suggest a CT abd/pelvis with PO and IV contrast to rule out malignancy  send PSA  Venous duplex    heparin gtt alone for now.   No plans for any catheter based therapies       [facility] [phone]

## 2022-01-24 NOTE — H&P ADULT - PROBLEM SELECTOR PLAN 1
Likely Submassive PE given biological markers of RHS and EKG changes. Etiology unclear, possibly 2/2 to COVID (given symptoms in December, natural infection 1 year prior but not vaccinated)  -monitor O2 sat  -On hep gtt, titrate to protocol  -Cards and PERT consulted appreciate recs  -f/u TTE  -CTPE deferred given RILEY, f/u V/Q scan  -Trend BNP and trop  -f/u LE dupplex  -f/u COVID swab  -holding Antihtns to assess Hemodynamics/if PE progressive to massive, if HDS, will restart tomorrow

## 2022-01-24 NOTE — H&P ADULT - ASSESSMENT
77 y/o M with h/o asthma, HTN, HLD, presenting with 1 week of worsening MARTINEZ, found to have elevated troponin BNP D-dimer with ekg changes c/f submassive PE. HDS on RA.

## 2022-01-24 NOTE — ED PROVIDER NOTE - OBJECTIVE STATEMENT
76 year old male with pmhx Asthma, HTN, HLD presents to ED c/o exertional SOB and fatigue x 5 days. Pt states that he has been extremely fatigued and winded with minimal exertion. Also endorses episode lightheadedness a few days ago where he felt he may pass out. Pt reports every year his "allergies" flare around January and he has issues breathing but this feels different. He admits to cough x 2 weeks. Denies fevers, chills, chest pain, diaphoresis, abd pain, n/v/d, LE swelling. Pt reports saw his PCP today for scheduled visit and EKG performed which was "very abnormal" compared to prior and pt was advised to come to ED. Reports he last saw Cardiologist 8 years ago and had negative Cardiac work-up at that time

## 2022-01-24 NOTE — H&P ADULT - NSHPLABSRESULTS_GEN_ALL_CORE
EKG personally reviewed: NSR TWI V2-v3  CXR personally reviewed: no infiltrates or effusions appreciated

## 2022-01-24 NOTE — H&P ADULT - PROBLEM SELECTOR PLAN 3
Currently normotensive-mildly HTN.  -on amlodipine 10mg, labetolol 200mg BID at home  -holding antihtns to assess PE progression  -if remains stable, will restart tomorrow

## 2022-01-24 NOTE — CONSULT NOTE ADULT - ASSESSMENT
75 yo M with hx of asthma, HTN, HLD, SIOBHAN presented with 1 week of MARTINEZ and found to have elevated d-dimer, trop, proBNP concerning for PE. TTE showed Lugo's sign, however, pt has ?RILEY and has not had confirmatory CTPE.    #PE  - trop 93 --> 80, proBNP of 2700, d-dimer 11995  - high risk given TTE showing RV enlargement, decrease RV systolic function, Lugo's sign, IV septum flattening in both systole and diastole consistent with RV pressure overload, no clot in transit  - c/w heparin gtt for tonight  - Cr was 1.1 in 1/2021 in outpatient record, can be component of RILEY right now, would repeat Cr tonight and if down-trending or stable would pursue CTPE  - obtain BLE dopplers  - telemetry  - PERT team to eval for thrombectomy after CTPE 77 yo M with hx of asthma, HTN, HLD, SIOBHAN presented with 1 week of MARTINEZ and found to have elevated d-dimer, trop, proBNP concerning for PE. TTE showed Lugo's sign, however, pt has ?RILEY and has not had confirmatory CTPE.    #PE  He has normal BP and HR, with normal O2 sat on room air  Continue heparin gtt for now  Given unprovoked event (covid was over a year ago), would suggest a CT abd/pelvis with PO and IV contrast to rule out malignancy  send PSA  Venous duplex    heparin gtt alone for now.   No plans for any catheter based therapies       [facility] [phone]

## 2022-01-24 NOTE — CHART NOTE - NSCHARTNOTEFT_GEN_A_CORE
MICU Transfer Note    Transfer from: Medicine  Transfer to:  (  ) Medicine    (  ) Telemetry    (  ) RCU    (  ) Palliative    (  ) Stroke Unit    (x) CICU  Accepting physician: SEFERINO Holguin    CC: 76 Y M new exertional dsypnea    HPI:  76 Y M H/O asthma, HLD, HTN, COVID on 1/2021, presenting with + cough for X2 weeks, new EKG abnormalities with lateral TWI, exertional dyspnea and SOB.     In ED patient presented stable, afebrile, no desaturation on RA, normotensive. Lab findings concerning for elevated Cr (1.7, increased from outpatient 1.1), BNP elevated to ~2K, D-dimer elevated to ~10K, trop elevated 93 (15:24) ->  80 (19:36). TTE demonstrated Moderate right atrial enlargement. Right ventricular  enlargement with decreased right ventricular systolic function. Right ventricular systolic dysfunction consistent with Lugo's sign, consistent with RV strain. CTPE was delayed in setting of new RILEY.        Vital Signs Last 24 Hrs  T(C): 37.1 (24 Jan 2022 22:40), Max: 37.1 (24 Jan 2022 15:19)  T(F): 98.8 (24 Jan 2022 22:40), Max: 98.8 (24 Jan 2022 22:40)  HR: 76 (24 Jan 2022 22:40) (57 - 100)  BP: 142/93 (24 Jan 2022 22:40) (113/73 - 155/91)  BP(mean): 112 (24 Jan 2022 22:40) (99 - 112)  RR: 24 (24 Jan 2022 22:40) (19 - 24)  SpO2: 94% (24 Jan 2022 22:40) (94% - 100%)  I&O's Summary      MEDICATIONS  (STANDING):  atorvastatin 40 milliGRAM(s) Oral at bedtime  budesonide  80 MICROgram(s)/formoterol 4.5 MICROgram(s) Inhaler 2 Puff(s) Inhalation two times a day  chlorhexidine 2% Cloths 1 Application(s) Topical daily  heparin  Infusion.  Unit(s)/Hr (15 mL/Hr) IV Continuous <Continuous>  influenza  Vaccine (HIGH DOSE) 0.7 milliLiter(s) IntraMuscular once  tamsulosin 0.4 milliGRAM(s) Oral daily    MEDICATIONS  (PRN):  heparin   Injectable 6500 Unit(s) IV Push every 6 hours PRN For aPTT less than 40  heparin   Injectable 3000 Unit(s) IV Push every 6 hours PRN For aPTT between 40 - 57    LABS                                            13.9                  Neurophils% (auto):   73.4   (01-24 @ 15:24):    7.16 )-----------(207          Lymphocytes% (auto):  14.5                                          42.2                   Eosinphils% (auto):   1.4      Manual%: Neutrophils x    ; Lymphocytes x    ; Eosinophils x    ; Bands%: x    ; Blasts x                                    144    |  107    |  34                  Calcium: 9.8   / iCa: x      (01-24 @ 15:24)    ----------------------------<  117       Magnesium: 1.9                              4.3     |  21     |  1.71             Phosphorous: x        TPro  7.1    /  Alb  4.3    /  TBili  0.6    /  DBili  x      /  AST  17     /  ALT  14     /  AlkPhos  109    24 Jan 2022 15:24    ( 01-24 @ 15:24 )   PT: 14.8 sec;   INR: 1.25 ratio  aPTT: 27.6 sec    ASSESSMENT & PLAN:   76 Y M presenting with new SOB, elevated cardiac markers, TTE findings consistent with PE. Started on Heparin, evaluated by PERT team, pending additional imaging for intervention candidacy.    Cardiac:  -Normotensive, normocardic, not requiring pressors at this time  -Elevated trop (93-> 80),   -Baseline HTN managed with amlodipine 10 mg, labetolol 200 mg BID, holding until further PE management.     NOTE INCOMPLETE AT THIS TIME CICU Transfer Note    Transfer from: Medicine  Transfer to:  (  ) Medicine    (  ) Telemetry    (  ) RCU    (  ) Palliative    (  ) Stroke Unit    (x) CICU  Accepting physician: SEFERINO Holguin    CC: 76 Y M new exertional dsypnea    HPI:  76 Y M H/O asthma, HLD, HTN, COVID on 1/2021, presenting with + cough for X2 weeks, new EKG abnormalities with lateral TWI, exertional dyspnea and SOB.     In ED patient presented stable, afebrile, no desaturation on RA, normotensive. Lab findings concerning for elevated Cr (1.7, increased from outpatient 1.1), BNP elevated to ~2K, D-dimer elevated to ~10K, trop elevated 93 (15:24) ->  80 (19:36). TTE demonstrated Moderate right atrial enlargement. Right ventricular  enlargement with decreased right ventricular systolic function. Right ventricular systolic dysfunction consistent with Lugo's sign, consistent with RV strain. CTPE was delayed in setting of new RILEY.        Vital Signs Last 24 Hrs  T(C): 37.1 (24 Jan 2022 22:40), Max: 37.1 (24 Jan 2022 15:19)  T(F): 98.8 (24 Jan 2022 22:40), Max: 98.8 (24 Jan 2022 22:40)  HR: 76 (24 Jan 2022 22:40) (57 - 100)  BP: 142/93 (24 Jan 2022 22:40) (113/73 - 155/91)  BP(mean): 112 (24 Jan 2022 22:40) (99 - 112)  RR: 24 (24 Jan 2022 22:40) (19 - 24)  SpO2: 94% (24 Jan 2022 22:40) (94% - 100%)  I&O's Summary      MEDICATIONS  (STANDING):  atorvastatin 40 milliGRAM(s) Oral at bedtime  budesonide  80 MICROgram(s)/formoterol 4.5 MICROgram(s) Inhaler 2 Puff(s) Inhalation two times a day  chlorhexidine 2% Cloths 1 Application(s) Topical daily  heparin  Infusion.  Unit(s)/Hr (15 mL/Hr) IV Continuous <Continuous>  influenza  Vaccine (HIGH DOSE) 0.7 milliLiter(s) IntraMuscular once  tamsulosin 0.4 milliGRAM(s) Oral daily    MEDICATIONS  (PRN):  heparin   Injectable 6500 Unit(s) IV Push every 6 hours PRN For aPTT less than 40  heparin   Injectable 3000 Unit(s) IV Push every 6 hours PRN For aPTT between 40 - 57    LABS                                            13.9                  Neurophils% (auto):   73.4   (01-24 @ 15:24):    7.16 )-----------(207          Lymphocytes% (auto):  14.5                                          42.2                   Eosinphils% (auto):   1.4      Manual%: Neutrophils x    ; Lymphocytes x    ; Eosinophils x    ; Bands%: x    ; Blasts x                                    144    |  107    |  34                  Calcium: 9.8   / iCa: x      (01-24 @ 15:24)    ----------------------------<  117       Magnesium: 1.9                              4.3     |  21     |  1.71             Phosphorous: x        TPro  7.1    /  Alb  4.3    /  TBili  0.6    /  DBili  x      /  AST  17     /  ALT  14     /  AlkPhos  109    24 Jan 2022 15:24    ( 01-24 @ 15:24 )   PT: 14.8 sec;   INR: 1.25 ratio  aPTT: 27.6 sec    ASSESSMENT & PLAN:   76 Y M presenting with new SOB, elevated cardiac markers, TTE findings consistent with PE. Started on Heparin, evaluated by PERT team, pending additional imaging for intervention candidacy.    Neuro:  -AAO X3 at this time  -No neurologic deficit    Cardiac:  -Normotensive, normocardic, not requiring pressors at this time  -Elevated cardiac markers - trop (93-> 80), BNP (2729), consistent with RV strain in setting of likely PE on TTE, continue to trend.  -PERT team consulted, awaiting CTPE for evaluation of candidacy for intervention, started on heparin.  -Baseline HTN managed with amlodipine 10 mg, labetolol 200 mg BID, holding until further PE management.     Pulmonary:  -No respiratory distress at this time,     Renal:  -New RILEY bun creat 34/1.71  -Etiology unknown at this time, repeat labs and evaluate for pre-, intra-, post-renal with urine lytes, osm.  -Repeat Creat, consider CTPA in setting of risk/benefit with potential kidney injury.  -consider fluid bolus 500 cc with bladder scan    Heme  -elevated PT/INR (14.8, 1.25)  -start on heparin for treatment of PE  -repeat PTT for trending heparin dosing    Infectious:  -No infectious etiology at this time  -Continue standard prophylactics CICU Accept Note    Transfer from: Medicine  Transfer to:  (  ) Medicine    (  ) Telemetry    (  ) RCU    (  ) Palliative    (  ) Stroke Unit    (x) CICU  Accepting physician: SEFERINO Holguin    CC: 76 Y M new exertional dsypnea    HPI:  76 Y M H/O asthma, HLD, HTN, COVID on 1/2021, presenting with + cough for X2 weeks, new EKG abnormalities with lateral TWI, exertional dyspnea and SOB.     In ED patient presented stable, afebrile, no desaturation on RA, normotensive. Lab findings concerning for elevated Cr (1.7, increased from outpatient 1.1), BNP elevated to ~2K, D-dimer elevated to ~10K, trop elevated 93 (15:24) ->  80 (19:36). TTE demonstrated Moderate right atrial enlargement. Right ventricular  enlargement with decreased right ventricular systolic function. Right ventricular systolic dysfunction consistent with Lugo's sign, consistent with RV strain. CTPE was delayed in setting of new RILEY.        Vital Signs Last 24 Hrs  T(C): 37.1 (24 Jan 2022 22:40), Max: 37.1 (24 Jan 2022 15:19)  T(F): 98.8 (24 Jan 2022 22:40), Max: 98.8 (24 Jan 2022 22:40)  HR: 76 (24 Jan 2022 22:40) (57 - 100)  BP: 142/93 (24 Jan 2022 22:40) (113/73 - 155/91)  BP(mean): 112 (24 Jan 2022 22:40) (99 - 112)  RR: 24 (24 Jan 2022 22:40) (19 - 24)  SpO2: 94% (24 Jan 2022 22:40) (94% - 100%)  I&O's Summary      MEDICATIONS  (STANDING):  atorvastatin 40 milliGRAM(s) Oral at bedtime  budesonide  80 MICROgram(s)/formoterol 4.5 MICROgram(s) Inhaler 2 Puff(s) Inhalation two times a day  chlorhexidine 2% Cloths 1 Application(s) Topical daily  heparin  Infusion.  Unit(s)/Hr (15 mL/Hr) IV Continuous <Continuous>  influenza  Vaccine (HIGH DOSE) 0.7 milliLiter(s) IntraMuscular once  tamsulosin 0.4 milliGRAM(s) Oral daily    MEDICATIONS  (PRN):  heparin   Injectable 6500 Unit(s) IV Push every 6 hours PRN For aPTT less than 40  heparin   Injectable 3000 Unit(s) IV Push every 6 hours PRN For aPTT between 40 - 57    LABS                                            13.9                  Neurophils% (auto):   73.4   (01-24 @ 15:24):    7.16 )-----------(207          Lymphocytes% (auto):  14.5                                          42.2                   Eosinphils% (auto):   1.4      Manual%: Neutrophils x    ; Lymphocytes x    ; Eosinophils x    ; Bands%: x    ; Blasts x                                    144    |  107    |  34                  Calcium: 9.8   / iCa: x      (01-24 @ 15:24)    ----------------------------<  117       Magnesium: 1.9                              4.3     |  21     |  1.71             Phosphorous: x        TPro  7.1    /  Alb  4.3    /  TBili  0.6    /  DBili  x      /  AST  17     /  ALT  14     /  AlkPhos  109    24 Jan 2022 15:24    ( 01-24 @ 15:24 )   PT: 14.8 sec;   INR: 1.25 ratio  aPTT: 27.6 sec    ASSESSMENT & PLAN:   76 Y M presenting with new SOB, elevated cardiac markers, TTE findings consistent with PE. Started on Heparin, evaluated by PERT team, pending additional imaging for intervention candidacy.    Neuro:  -AAO X3 at this time  -No neurologic deficit    Cardiac:  -Normotensive, normocardic, not requiring pressors at this time  -Elevated cardiac markers - trop (93-> 80), BNP (2729), consistent with RV strain in setting of likely PE on TTE, continue to trend.  -PERT team consulted, awaiting CTPE for evaluation of candidacy for intervention, started on heparin.  -Baseline HTN managed with amlodipine 10 mg, labetolol 200 mg BID, holding until further PE management.     Pulmonary:  -No respiratory distress at this time,     Renal:  -New RILEY bun creat 34/1.71  -Etiology unknown at this time, repeat labs and evaluate for pre-, intra-, post-renal with urine lytes, osm.  -Repeat Creat, consider CTPA in setting of risk/benefit with potential kidney injury.  -consider fluid bolus 500 cc with bladder scan    Heme  -elevated PT/INR (14.8, 1.25)  -start on heparin for treatment of PE  -repeat PTT for trending heparin dosing    Infectious:  -No infectious etiology at this time  -Continue standard prophylactics

## 2022-01-24 NOTE — H&P ADULT - PROBLEM SELECTOR PLAN 5
-continue montelukast  -Continue breo->symbicort while admitted (patient reports minimal compliance with inhalers at home)

## 2022-01-24 NOTE — PATIENT PROFILE ADULT - NSPRESCRUSEDDRG_GEN_A_NUR
(G24.01) Tardive dyskinesia  (primary encounter diagnosis)    Plan: carbidopa-levodopa (SINEMET)  MG per             Tablet BID. Titration instructions are on the progress note from [facility].            (F31.9) Bipolar 1 disorder (H)    Plan: Continue on your other medications.        
No

## 2022-01-24 NOTE — PATIENT PROFILE ADULT - FALL HARM RISK - HARM RISK INTERVENTIONS

## 2022-01-24 NOTE — CONSULT NOTE ADULT - SUBJECTIVE AND OBJECTIVE BOX
Patient seen and evaluated at bedside    Chief Complaint:    HPI:  CC: 75 y/o M with new onset exertional dyspnea for 1 week    HPI: 75 y/o M with h/o asthma, HTN, HLD, presenting with 1 week of worsening MARTINEZ. Over the past week, patient has noticed new onset MARTINEZ while walking his dog or going up the two flights of stairs in his house, with associated dizziness/lightheadedness. Previously no limit on ET. Denies cp/LE edema. Reports + cough for the past 2 weeks however c/w seasonal cough for him. Presented to his cardiologist today who noted abnormal ekg compared to prior so sent to ED. Of note, patient with natural; Covid infection 1/2021, not vaccinated post. Had rhinorrhea + new onset cough in end of december which self resolved.     In ED afeb hds, satting well on RA. Labs notable for Cr 1.7, Trop 93, BNP 2K, D-dimer 10K. EKG with new onset TWI v2-v3. No signs of RHS on EKG. Cardiology, PCP, and PERT team consulted. Started on hep gtt.  (24 Jan 2022 18:02)    PMHx:   HTN (hypertension)  HLD (hyperlipidemia)  Asthma  2019 novel coronavirus disease (COVID-19)    PSHx:   No significant past surgical history    Allergies:  No Known Allergies    Current Medications:   atorvastatin 40 milliGRAM(s) Oral at bedtime  budesonide  80 MICROgram(s)/formoterol 4.5 MICROgram(s) Inhaler 2 Puff(s) Inhalation two times a day  heparin   Injectable 6500 Unit(s) IV Push every 6 hours PRN  heparin   Injectable 3000 Unit(s) IV Push every 6 hours PRN  heparin  Infusion.  Unit(s)/Hr IV Continuous <Continuous>  tamsulosin 0.4 milliGRAM(s) Oral daily    FAMILY HISTORY:  No pertinent family history in first degree relatives    REVIEW OF SYSTEMS:  Constitutional:     [x ] negative [ ] fevers [ ] chills [ ] weight loss [ ] weight gain  HEENT:                  [x ] negative [ ] dry eyes [ ] eye irritation [ ] postnasal drip [ ] nasal congestion  CV:                         [ x] negative  [ ] chest pain [ ] orthopnea [ ] palpitations [ ] murmur  Resp:                     [ ] negative [ ] cough [ ] shortness of breath [x ] dyspnea [ ] wheezing [ ] sputum [ ]hemoptysis  GI:                          [ x] negative [ ] nausea [ ] vomiting [ ] diarrhea [ ] constipation [ ] abd pain [ ] dysphagia   :                        [ x] negative [ ] dysuria [ ] nocturia [ ] hematuria [ ] increased urinary frequency  Musculoskeletal: [x ] negative [ ] back pain [ ] myalgias [ ] arthralgias [ ] fracture  Skin:                       [ x] negative [ ] rash [ ] itch  Neurological:        [ x] negative [ ] headache [ ] dizziness [ ] syncope [ ] weakness [ ] numbness  Psychiatric:           [ x] negative [ ] anxiety [ ] depression  Endocrine:            [ x] negative [ ] diabetes [ ] thyroid problem  Heme/Lymph:      [ x] negative [ ] anemia [ ] bleeding problem  Allergic/Immune: [ x] negative [ ] itchy eyes [ ] nasal discharge [ ] hives [ ] angioedema    [ x] All other systems negative  [ ] Unable to assess ROS due to    Physical Exam:  T(F): 98 (01-24), Max: 98.7 (01-24)  HR: 57 (01-24) (57 - 100)  BP: 149/72 (01-24) (113/73 - 151/74)  RR: 20 (01-24)  SpO2: 96% (01-24)  GENERAL: No acute distress, well-developed  HEAD:  Atraumatic, Normocephalic  ENT: EOMI, PERRLA, conjunctiva and sclera clear, Neck supple, No JVD, moist mucosa  CHEST/LUNG: Clear to auscultation bilaterally; No wheeze, equal breath sounds bilaterally   BACK: No spinal tenderness  HEART: Regular rate and rhythm; No murmurs, rubs, or gallops  ABDOMEN: Soft, Nontender, Nondistended; Bowel sounds present  EXTREMITIES:  No clubbing, cyanosis, or edema  PSYCH: Nl behavior, nl affect  NEUROLOGY: AAOx3, non-focal, cranial nerves intact  SKIN: Normal color, No rashes or lesions  LINES:    Cardiovascular Diagnostic Testing:    ECG: Personally reviewed:    < from: Transthoracic Echocardiogram (01.24.22 @ 16:33) >  Conclusions:  1. Normal mitral valve. Minimal mitral regurgitation.  2. Normal trileaflet aortic valve. Minimal aortic  regurgitation.  3. Mild left atrial enlargement.  LA volume index = 17  cc/m2. Normal left ventricular systolic function. No  segmental wall motion abnormalities. Flattening of the  interventricular septum in both systole and diastole is  consistent with right ventricular pressure overload. LVEF  visually assessed is 55-60%.  4.Moderate right atrial enlargement. Rightventricular  enlargement with decreased right ventricular systolic  function. Right ventricular systolic dysfunction with  preserved apical wall motion is seen consistent with  Lguo's sign. There is RV strain.  5.Normal tricuspid valve. Mild-moderate tricuspid  regurgitation.Estimated pulmonary artery systolic pressure  equals 39  mm Hg, assuming right atrial pressure equals 8  mm Hg, consistent with borderline pulmonary pressures.  6. No pericardial effusion seen.  *** No previous Echo exam.    < end of copied text >    Imaging:    CXR: Personally reviewed    Labs: Personally reviewed                        13.9   7.16  )-----------( 207      ( 24 Jan 2022 15:24 )             42.2     01-24    144  |  107  |  34<H>  ----------------------------<  117<H>  4.3   |  21<L>  |  1.71<H>    Ca    9.8      24 Jan 2022 15:24  Mg     1.9     01-24    TPro  7.1  /  Alb  4.3  /  TBili  0.6  /  DBili  x   /  AST  17  /  ALT  14  /  AlkPhos  109  01-24    PT/INR - ( 24 Jan 2022 15:24 )   PT: 14.8 sec;   INR: 1.25 ratio         PTT - ( 24 Jan 2022 15:24 )  PTT:27.6 sec  Serum Pro-Brain Natriuretic Peptide: 2729 pg/mL (01-24 @ 15:24)

## 2022-01-24 NOTE — H&P ADULT - NSICDXPASTMEDICALHX_GEN_ALL_CORE_FT
PAST MEDICAL HISTORY:  2019 novel coronavirus disease (COVID-19) 1/2021    Asthma     HLD (hyperlipidemia)     HTN (hypertension)

## 2022-01-24 NOTE — ED ADULT NURSE NOTE - OBJECTIVE STATEMENT
77y/o M coming to the ED c/o SOB. Pt states that over the past x6 days having worsening SOB on exertion & @ rest a/w productive cough. Pt states went to PMD who noted EKG changes. Pt denies any CP/Fever/Chills/N/V/D. On exam, pt is breathing spontaneously, able to speak full sentences w/o difficulty, saturating 98% RA. Heart monitor placed, EKG completed. Abdomen soft, nontender, nondistended. IV placed, labs collected and sent. Bed placed in lowest position. Pt given call bell and educated on how to use. VSS

## 2022-01-24 NOTE — ED PROVIDER NOTE - PHYSICAL EXAMINATION
CONSTITUTIONAL: Patient is awake, alert and oriented x 3. Patient is well appearing and in no acute distress  HEAD: NCAT  NECK: supple, FROM  LUNGS: CTA B/L, no wheezing or rales   HEART: RRR.+S1S2 no murmurs  ABDOMEN: Soft, non-distended, nttp, no rebound or guarding  EXTREMITY: no edema or calf tenderness b/l, FROM upper and lower ext b/l  SKIN: with no rash or lesions  NEURO: No focal deficits

## 2022-01-25 LAB
ALBUMIN SERPL ELPH-MCNC: 3.5 G/DL — SIGNIFICANT CHANGE UP (ref 3.3–5)
ALBUMIN SERPL ELPH-MCNC: 4 G/DL — SIGNIFICANT CHANGE UP (ref 3.3–5)
ALP SERPL-CCNC: 100 U/L — SIGNIFICANT CHANGE UP (ref 40–120)
ALP SERPL-CCNC: 97 U/L — SIGNIFICANT CHANGE UP (ref 40–120)
ALT FLD-CCNC: 12 U/L — SIGNIFICANT CHANGE UP (ref 10–45)
ALT FLD-CCNC: 13 U/L — SIGNIFICANT CHANGE UP (ref 10–45)
ANION GAP SERPL CALC-SCNC: 13 MMOL/L — SIGNIFICANT CHANGE UP (ref 5–17)
ANION GAP SERPL CALC-SCNC: 14 MMOL/L — SIGNIFICANT CHANGE UP (ref 5–17)
APTT BLD: 76.5 SEC — HIGH (ref 27.5–35.5)
APTT BLD: 91.3 SEC — HIGH (ref 27.5–35.5)
AST SERPL-CCNC: 17 U/L — SIGNIFICANT CHANGE UP (ref 10–40)
AST SERPL-CCNC: 18 U/L — SIGNIFICANT CHANGE UP (ref 10–40)
BASOPHILS # BLD AUTO: 0.02 K/UL — SIGNIFICANT CHANGE UP (ref 0–0.2)
BASOPHILS # BLD AUTO: 0.03 K/UL — SIGNIFICANT CHANGE UP (ref 0–0.2)
BASOPHILS NFR BLD AUTO: 0.3 % — SIGNIFICANT CHANGE UP (ref 0–2)
BASOPHILS NFR BLD AUTO: 0.6 % — SIGNIFICANT CHANGE UP (ref 0–2)
BILIRUB SERPL-MCNC: 0.3 MG/DL — SIGNIFICANT CHANGE UP (ref 0.2–1.2)
BILIRUB SERPL-MCNC: 0.5 MG/DL — SIGNIFICANT CHANGE UP (ref 0.2–1.2)
BLD GP AB SCN SERPL QL: NEGATIVE — SIGNIFICANT CHANGE UP
BUN SERPL-MCNC: 28 MG/DL — HIGH (ref 7–23)
BUN SERPL-MCNC: 32 MG/DL — HIGH (ref 7–23)
CALCIUM SERPL-MCNC: 8.9 MG/DL — SIGNIFICANT CHANGE UP (ref 8.4–10.5)
CALCIUM SERPL-MCNC: 9.3 MG/DL — SIGNIFICANT CHANGE UP (ref 8.4–10.5)
CHLORIDE SERPL-SCNC: 108 MMOL/L — SIGNIFICANT CHANGE UP (ref 96–108)
CHLORIDE SERPL-SCNC: 110 MMOL/L — HIGH (ref 96–108)
CO2 SERPL-SCNC: 18 MMOL/L — LOW (ref 22–31)
CO2 SERPL-SCNC: 21 MMOL/L — LOW (ref 22–31)
CREAT SERPL-MCNC: 1.16 MG/DL — SIGNIFICANT CHANGE UP (ref 0.5–1.3)
CREAT SERPL-MCNC: 1.31 MG/DL — HIGH (ref 0.5–1.3)
EOSINOPHIL # BLD AUTO: 0.26 K/UL — SIGNIFICANT CHANGE UP (ref 0–0.5)
EOSINOPHIL # BLD AUTO: 0.36 K/UL — SIGNIFICANT CHANGE UP (ref 0–0.5)
EOSINOPHIL NFR BLD AUTO: 4.5 % — SIGNIFICANT CHANGE UP (ref 0–6)
EOSINOPHIL NFR BLD AUTO: 7 % — HIGH (ref 0–6)
GLUCOSE SERPL-MCNC: 117 MG/DL — HIGH (ref 70–99)
GLUCOSE SERPL-MCNC: 131 MG/DL — HIGH (ref 70–99)
HCT VFR BLD CALC: 36.3 % — LOW (ref 39–50)
HCT VFR BLD CALC: 39.1 % — SIGNIFICANT CHANGE UP (ref 39–50)
HGB BLD-MCNC: 12.1 G/DL — LOW (ref 13–17)
HGB BLD-MCNC: 13 G/DL — SIGNIFICANT CHANGE UP (ref 13–17)
IMM GRANULOCYTES NFR BLD AUTO: 0.3 % — SIGNIFICANT CHANGE UP (ref 0–1.5)
IMM GRANULOCYTES NFR BLD AUTO: 0.4 % — SIGNIFICANT CHANGE UP (ref 0–1.5)
INR BLD: 1.18 RATIO — HIGH (ref 0.88–1.16)
LACTATE SERPL-SCNC: 1.4 MMOL/L — SIGNIFICANT CHANGE UP (ref 0.7–2)
LACTATE SERPL-SCNC: 1.8 MMOL/L — SIGNIFICANT CHANGE UP (ref 0.7–2)
LYMPHOCYTES # BLD AUTO: 1.4 K/UL — SIGNIFICANT CHANGE UP (ref 1–3.3)
LYMPHOCYTES # BLD AUTO: 1.54 K/UL — SIGNIFICANT CHANGE UP (ref 1–3.3)
LYMPHOCYTES # BLD AUTO: 26.8 % — SIGNIFICANT CHANGE UP (ref 13–44)
LYMPHOCYTES # BLD AUTO: 27.3 % — SIGNIFICANT CHANGE UP (ref 13–44)
MAGNESIUM SERPL-MCNC: 1.8 MG/DL — SIGNIFICANT CHANGE UP (ref 1.6–2.6)
MAGNESIUM SERPL-MCNC: 1.8 MG/DL — SIGNIFICANT CHANGE UP (ref 1.6–2.6)
MCHC RBC-ENTMCNC: 29.9 PG — SIGNIFICANT CHANGE UP (ref 27–34)
MCHC RBC-ENTMCNC: 30.1 PG — SIGNIFICANT CHANGE UP (ref 27–34)
MCHC RBC-ENTMCNC: 33.2 GM/DL — SIGNIFICANT CHANGE UP (ref 32–36)
MCHC RBC-ENTMCNC: 33.3 GM/DL — SIGNIFICANT CHANGE UP (ref 32–36)
MCV RBC AUTO: 89.9 FL — SIGNIFICANT CHANGE UP (ref 80–100)
MCV RBC AUTO: 90.3 FL — SIGNIFICANT CHANGE UP (ref 80–100)
MONOCYTES # BLD AUTO: 0.62 K/UL — SIGNIFICANT CHANGE UP (ref 0–0.9)
MONOCYTES # BLD AUTO: 0.64 K/UL — SIGNIFICANT CHANGE UP (ref 0–0.9)
MONOCYTES NFR BLD AUTO: 10.8 % — SIGNIFICANT CHANGE UP (ref 2–14)
MONOCYTES NFR BLD AUTO: 12.5 % — SIGNIFICANT CHANGE UP (ref 2–14)
NEUTROPHILS # BLD AUTO: 2.67 K/UL — SIGNIFICANT CHANGE UP (ref 1.8–7.4)
NEUTROPHILS # BLD AUTO: 3.29 K/UL — SIGNIFICANT CHANGE UP (ref 1.8–7.4)
NEUTROPHILS NFR BLD AUTO: 52.2 % — SIGNIFICANT CHANGE UP (ref 43–77)
NEUTROPHILS NFR BLD AUTO: 57.3 % — SIGNIFICANT CHANGE UP (ref 43–77)
NRBC # BLD: 0 /100 WBCS — SIGNIFICANT CHANGE UP (ref 0–0)
NRBC # BLD: 0 /100 WBCS — SIGNIFICANT CHANGE UP (ref 0–0)
NT-PROBNP SERPL-SCNC: 1627 PG/ML — HIGH (ref 0–300)
NT-PROBNP SERPL-SCNC: 1960 PG/ML — HIGH (ref 0–300)
PHOSPHATE SERPL-MCNC: 2.5 MG/DL — SIGNIFICANT CHANGE UP (ref 2.5–4.5)
PHOSPHATE SERPL-MCNC: 2.6 MG/DL — SIGNIFICANT CHANGE UP (ref 2.5–4.5)
PLATELET # BLD AUTO: 189 K/UL — SIGNIFICANT CHANGE UP (ref 150–400)
PLATELET # BLD AUTO: 202 K/UL — SIGNIFICANT CHANGE UP (ref 150–400)
POTASSIUM SERPL-MCNC: 3.5 MMOL/L — SIGNIFICANT CHANGE UP (ref 3.5–5.3)
POTASSIUM SERPL-MCNC: 3.6 MMOL/L — SIGNIFICANT CHANGE UP (ref 3.5–5.3)
POTASSIUM SERPL-SCNC: 3.5 MMOL/L — SIGNIFICANT CHANGE UP (ref 3.5–5.3)
POTASSIUM SERPL-SCNC: 3.6 MMOL/L — SIGNIFICANT CHANGE UP (ref 3.5–5.3)
PROT SERPL-MCNC: 5.8 G/DL — LOW (ref 6–8.3)
PROT SERPL-MCNC: 6.5 G/DL — SIGNIFICANT CHANGE UP (ref 6–8.3)
PROTHROM AB SERPL-ACNC: 14.1 SEC — HIGH (ref 10.6–13.6)
RBC # BLD: 4.02 M/UL — LOW (ref 4.2–5.8)
RBC # BLD: 4.35 M/UL — SIGNIFICANT CHANGE UP (ref 4.2–5.8)
RBC # FLD: 12 % — SIGNIFICANT CHANGE UP (ref 10.3–14.5)
RBC # FLD: 12 % — SIGNIFICANT CHANGE UP (ref 10.3–14.5)
RH IG SCN BLD-IMP: POSITIVE — SIGNIFICANT CHANGE UP
SODIUM SERPL-SCNC: 140 MMOL/L — SIGNIFICANT CHANGE UP (ref 135–145)
SODIUM SERPL-SCNC: 144 MMOL/L — SIGNIFICANT CHANGE UP (ref 135–145)
TROPONIN T, HIGH SENSITIVITY RESULT: 69 NG/L — HIGH (ref 0–51)
TROPONIN T, HIGH SENSITIVITY RESULT: 78 NG/L — HIGH (ref 0–51)
WBC # BLD: 5.12 K/UL — SIGNIFICANT CHANGE UP (ref 3.8–10.5)
WBC # BLD: 5.75 K/UL — SIGNIFICANT CHANGE UP (ref 3.8–10.5)
WBC # FLD AUTO: 5.12 K/UL — SIGNIFICANT CHANGE UP (ref 3.8–10.5)
WBC # FLD AUTO: 5.75 K/UL — SIGNIFICANT CHANGE UP (ref 3.8–10.5)

## 2022-01-25 PROCEDURE — 99223 1ST HOSP IP/OBS HIGH 75: CPT

## 2022-01-25 PROCEDURE — 71275 CT ANGIOGRAPHY CHEST: CPT | Mod: 26

## 2022-01-25 PROCEDURE — 99291 CRITICAL CARE FIRST HOUR: CPT

## 2022-01-25 PROCEDURE — 93010 ELECTROCARDIOGRAM REPORT: CPT

## 2022-01-25 RX ORDER — TIOTROPIUM BROMIDE 18 UG/1
1 CAPSULE ORAL; RESPIRATORY (INHALATION) DAILY
Refills: 0 | Status: DISCONTINUED | OUTPATIENT
Start: 2022-01-25 | End: 2022-01-28

## 2022-01-25 RX ORDER — POTASSIUM CHLORIDE 20 MEQ
20 PACKET (EA) ORAL ONCE
Refills: 0 | Status: COMPLETED | OUTPATIENT
Start: 2022-01-25 | End: 2022-01-25

## 2022-01-25 RX ORDER — MAGNESIUM SULFATE 500 MG/ML
2 VIAL (ML) INJECTION ONCE
Refills: 0 | Status: COMPLETED | OUTPATIENT
Start: 2022-01-25 | End: 2022-01-25

## 2022-01-25 RX ADMIN — HEPARIN SODIUM 14 UNIT(S)/HR: 5000 INJECTION INTRAVENOUS; SUBCUTANEOUS at 10:27

## 2022-01-25 RX ADMIN — CHLORHEXIDINE GLUCONATE 1 APPLICATION(S): 213 SOLUTION TOPICAL at 06:49

## 2022-01-25 RX ADMIN — HEPARIN SODIUM 14 UNIT(S)/HR: 5000 INJECTION INTRAVENOUS; SUBCUTANEOUS at 11:13

## 2022-01-25 RX ADMIN — ATORVASTATIN CALCIUM 40 MILLIGRAM(S): 80 TABLET, FILM COATED ORAL at 00:13

## 2022-01-25 RX ADMIN — ATORVASTATIN CALCIUM 40 MILLIGRAM(S): 80 TABLET, FILM COATED ORAL at 21:25

## 2022-01-25 RX ADMIN — HEPARIN SODIUM 14 UNIT(S)/HR: 5000 INJECTION INTRAVENOUS; SUBCUTANEOUS at 00:00

## 2022-01-25 RX ADMIN — BUDESONIDE AND FORMOTEROL FUMARATE DIHYDRATE 2 PUFF(S): 160; 4.5 AEROSOL RESPIRATORY (INHALATION) at 17:52

## 2022-01-25 RX ADMIN — TAMSULOSIN HYDROCHLORIDE 0.4 MILLIGRAM(S): 0.4 CAPSULE ORAL at 21:25

## 2022-01-25 RX ADMIN — MONTELUKAST 10 MILLIGRAM(S): 4 TABLET, CHEWABLE ORAL at 13:56

## 2022-01-25 RX ADMIN — HEPARIN SODIUM 14 UNIT(S)/HR: 5000 INJECTION INTRAVENOUS; SUBCUTANEOUS at 05:09

## 2022-01-25 RX ADMIN — Medication 25 GRAM(S): at 06:53

## 2022-01-25 RX ADMIN — HEPARIN SODIUM 14 UNIT(S)/HR: 5000 INJECTION INTRAVENOUS; SUBCUTANEOUS at 19:30

## 2022-01-25 RX ADMIN — Medication 20 MILLIEQUIVALENT(S): at 06:53

## 2022-01-25 NOTE — PROGRESS NOTE ADULT - ASSESSMENT
75 y/o M with h/o asthma, HTN, HLD, presenting with 1 week of worsening MARTINEZ, found to have elevated troponin BNP D-dimer with ekg changes c/f submassive PE. HDS on RA.

## 2022-01-25 NOTE — PROGRESS NOTE ADULT - SUBJECTIVE AND OBJECTIVE BOX
Patient is a 76y old  Male who presents with a chief complaint of SOB (25 Jan 2022 07:52)  patient not known to me, assigned this AM to assume care  chart reviewed and events thus far noted  admitted overnight by faculty hospitalist     DATE OF SERVICE: 01-25-22 @ 14:23    SUBJECTIVE / OVERNIGHT EVENTS: overnight events noted    ROS:  Resp: No cough no sputum production  CVS: No chest pain no palpitations no orthopnea  GI: no N/V/D  : no dysuria, no hematuria  Neuro: no weakness no paresthesias  Heme: No petechiae no easy bruising  Msk: No joint pain no swelling  Skin: No rash no itching        MEDICATIONS  (STANDING):  atorvastatin 40 milliGRAM(s) Oral at bedtime  budesonide  80 MICROgram(s)/formoterol 4.5 MICROgram(s) Inhaler 2 Puff(s) Inhalation two times a day  heparin  Infusion 1400 Unit(s)/Hr (14 mL/Hr) IV Continuous <Continuous>  influenza  Vaccine (HIGH DOSE) 0.7 milliLiter(s) IntraMuscular once  montelukast 10 milliGRAM(s) Oral daily  tamsulosin 0.4 milliGRAM(s) Oral daily    MEDICATIONS  (PRN):        CAPILLARY BLOOD GLUCOSE        I&O's Summary    24 Jan 2022 07:01  -  25 Jan 2022 07:00  --------------------------------------------------------  IN: 112 mL / OUT: 350 mL / NET: -238 mL    25 Jan 2022 07:01  -  25 Jan 2022 14:23  --------------------------------------------------------  IN: 376 mL / OUT: 650 mL / NET: -274 mL        Vital Signs Last 24 Hrs  T(C): 36.6 (25 Jan 2022 11:00), Max: 37.1 (24 Jan 2022 15:19)  T(F): 97.9 (25 Jan 2022 11:00), Max: 98.8 (24 Jan 2022 22:40)  HR: 60 (25 Jan 2022 11:00) (55 - 100)  BP: 143/92 (25 Jan 2022 11:00) (113/73 - 167/93)  BP(mean): 60 (25 Jan 2022 11:00) (60 - 124)  RR: 20 (25 Jan 2022 11:00) (19 - 30)  SpO2: 96% (25 Jan 2022 11:00) (91% - 100%)    PHYSICAL EXAM:  GENERAL: in no apparent distress  HEAD:  Atraumatic, Normocephalic  EYES: EOMI, PERRLA, sclera clear  NECK: Supple, No JVD  CHEST/LUNG: clear b/l, no wheeze  HEART: S1 S2; no murmurs appreciated  ABDOMEN: Soft, Nontender, Bowel sounds present  EXTREMITIES:  No clubbing or cyanosis,  no edema  NEUROLOGY: AO x 3 non-focal  SKIN: No rashes or lesions    LABS:                        12.1   5.12  )-----------( 189      ( 25 Jan 2022 04:21 )             36.3     01-25    140  |  108  |  28<H>  ----------------------------<  131<H>  3.5   |  18<L>  |  1.16    Ca    8.9      25 Jan 2022 04:21  Phos  2.5     01-25  Mg     1.8     01-25    TPro  5.8<L>  /  Alb  3.5  /  TBili  0.3  /  DBili  x   /  AST  18  /  ALT  12  /  AlkPhos  97  01-25    PT/INR - ( 25 Jan 2022 04:21 )   PT: 14.1 sec;   INR: 1.18 ratio         PTT - ( 25 Jan 2022 09:15 )  PTT:91.3 sec            All consultant(s) notes reviewed and care discussed with other providers        Contact Number, Dr Crespo [phone]

## 2022-01-25 NOTE — PROGRESS NOTE ADULT - SUBJECTIVE AND OBJECTIVE BOX
PATIENT:  CONNIE OROZCO  [id number]    CHIEF COMPLAINT:  Patient is a 76y old  Male who presents with a chief complaint of SOB (25 Jan 2022 05:55)      INTERVAL HISTORYOVERNIGHT EVENTS:      REVIEW OF SYSTEMS:    Constitutional:     [ ] negative [ ] fevers [ ] chills [ ] weight loss [ ] weight gain  HEENT:                  [ ] negative [ ] dry eyes [ ] eye irritation [ ] postnasal drip [ ] nasal congestion  CV:                         [ ] negative  [ ] chest pain [ ] orthopnea [ ] palpitations [ ] murmur  Resp:                     [ ] negative [ ] cough [ ] shortness of breath [ ] dyspnea [ ] wheezing [ ] sputum [ ] hemoptysis  GI:                          [ ] negative [ ] nausea [ ] vomiting [ ] diarrhea [ ] constipation [ ] abd pain [ ] dysphagia   :                        [ ] negative [ ] dysuria [ ] nocturia [ ] hematuria [ ] increased urinary frequency  Musculoskeletal: [ ] negative [ ] back pain [ ] myalgias [ ] arthralgias [ ] fracture  Skin:                       [ ] negative [ ] rash [ ] itch  Neurological:        [ ] negative [ ] headache [ ] dizziness [ ] syncope [ ] weakness [ ] numbness  Psychiatric:           [ ] negative [ ] anxiety [ ] depression  Endocrine:            [ ] negative [ ] diabetes [ ] thyroid problem  Heme/Lymph:      [ ] negative [ ] anemia [ ] bleeding problem  Allergic/Immune: [ ] negative [ ] itchy eyes [ ] nasal discharge [ ] hives [ ] angioedema    [ ] All other systems negative  [ ] Unable to assess ROS because ________.    MEDICATIONS:  MEDICATIONS  (STANDING):  atorvastatin 40 milliGRAM(s) Oral at bedtime  budesonide  80 MICROgram(s)/formoterol 4.5 MICROgram(s) Inhaler 2 Puff(s) Inhalation two times a day  chlorhexidine 2% Cloths 1 Application(s) Topical daily  heparin  Infusion 1400 Unit(s)/Hr (14 mL/Hr) IV Continuous <Continuous>  influenza  Vaccine (HIGH DOSE) 0.7 milliLiter(s) IntraMuscular once  montelukast 10 milliGRAM(s) Oral daily  tamsulosin 0.4 milliGRAM(s) Oral daily    MEDICATIONS  (PRN):      ALLERGIES:  Allergies    No Known Allergies    Intolerances        OBJECTIVE:  ICU Vital Signs Last 24 Hrs  T(C): 36.9 (25 Jan 2022 07:00), Max: 37.1 (24 Jan 2022 15:19)  T(F): 98.4 (25 Jan 2022 07:00), Max: 98.8 (24 Jan 2022 22:40)  HR: 61 (25 Jan 2022 07:00) (55 - 100)  BP: 167/93 (25 Jan 2022 07:00) (113/73 - 167/93)  BP(mean): 124 (25 Jan 2022 07:00) (83 - 124)  ABP: --  ABP(mean): --  RR: 29 (25 Jan 2022 07:00) (19 - 30)  SpO2: 96% (25 Jan 2022 07:00) (91% - 100%)      Adult Advanced Hemodynamics Last 24 Hrs  CVP(mm Hg): --  CVP(cm H2O): --  CO: --  CI: --  PA: --  PA(mean): --  PCWP: --  SVR: --  SVRI: --  PVR: --  PVRI: --  CAPILLARY BLOOD GLUCOSE        CAPILLARY BLOOD GLUCOSE        I&O's Summary    24 Jan 2022 07:01  -  25 Jan 2022 07:00  --------------------------------------------------------  IN: 112 mL / OUT: 350 mL / NET: -238 mL      Daily Height in cm: 175.26 (24 Jan 2022 14:36)    Daily     PHYSICAL EXAMINATION:  General: WN/WD NAD  HEENT: PERRLA, EOMI, moist mucous membranes  Neurology: A&Ox3, nonfocal, RON x 4  Respiratory: CTA B/L, normal respiratory effort, no wheezes, crackles, rales  CV: RRR, S1S2, no murmurs, rubs or gallops  Abdominal: Soft, NT, ND +BS, Last BM  Extremities: No edema, + peripheral pulses  Incisions:   Tubes:    LABS:                          12.1   5.12  )-----------( 189      ( 25 Jan 2022 04:21 )             36.3     01-25    140  |  108  |  28<H>  ----------------------------<  131<H>  3.5   |  18<L>  |  1.16    Ca    8.9      25 Jan 2022 04:21  Phos  2.5     01-25  Mg     1.8     01-25    TPro  5.8<L>  /  Alb  3.5  /  TBili  0.3  /  DBili  x   /  AST  18  /  ALT  12  /  AlkPhos  97  01-25    LIVER FUNCTIONS - ( 25 Jan 2022 04:21 )  Alb: 3.5 g/dL / Pro: 5.8 g/dL / ALK PHOS: 97 U/L / ALT: 12 U/L / AST: 18 U/L / GGT: x           PT/INR - ( 25 Jan 2022 04:21 )   PT: 14.1 sec;   INR: 1.18 ratio         PTT - ( 25 Jan 2022 04:21 )  PTT:76.5 sec            Neuro:  -AAO X3 at this time  -No neurologic deficit    Cardiac:  -Normotensive, normocardic, not requiring pressors at this time  -Elevated cardiac markers - trop (93-> 80), BNP (2729), consistent with RV strain in setting of likely PE on TTE, continue to trend.  -PERT team consulted, awaiting CTPE for evaluation of candidacy for intervention, started on heparin.  -Baseline HTN managed with amlodipine 10 mg, labetolol 200 mg BID, holding until further PE management.     Pulmonary:  -No respiratory distress at this time,     Renal:  -New RILEY bun creat 34/1.71  -Etiology unknown at this time, repeat labs and evaluate for pre-, intra-, post-renal with urine lytes, osm.  -Repeat Creat, consider CTPA in setting of risk/benefit with potential kidney injury.  -consider fluid bolus 500 cc with bladder scan    GI:   DASH Diet  - assess need for bowel regimen     ENDO:  - f/u A1c TSH     Heme  -elevated PT/INR (14.8, 1.25)  -start on heparin for treatment of PE  -repeat PTT for trending heparin dosing    Infectious:  -No infectious etiology at this time  -Continue standard prophylactics.       PATIENT:  CONNIE OROZCO  [id number]    CHIEF COMPLAINT:  Patient is a 76y old  Male who presents with a chief complaint of SOB (25 Jan 2022 05:55)      INTERVAL HISTORYOVERNIGHT EVENTS:      REVIEW OF SYSTEMS:    Constitutional:     [ ] negative [ ] fevers [ ] chills [ ] weight loss [ ] weight gain  HEENT:                  [ ] negative [ ] dry eyes [ ] eye irritation [ ] postnasal drip [ ] nasal congestion  CV:                         [ ] negative  [ ] chest pain [ ] orthopnea [ ] palpitations [ ] murmur  Resp:                     [ ] negative [ ] cough [ ] shortness of breath [ ] dyspnea [ ] wheezing [ ] sputum [ ] hemoptysis  GI:                          [ ] negative [ ] nausea [ ] vomiting [ ] diarrhea [ ] constipation [ ] abd pain [ ] dysphagia   :                        [ ] negative [ ] dysuria [ ] nocturia [ ] hematuria [ ] increased urinary frequency  Musculoskeletal: [ ] negative [ ] back pain [ ] myalgias [ ] arthralgias [ ] fracture  Skin:                       [ ] negative [ ] rash [ ] itch  Neurological:        [ ] negative [ ] headache [ ] dizziness [ ] syncope [ ] weakness [ ] numbness  Psychiatric:           [ ] negative [ ] anxiety [ ] depression  Endocrine:            [ ] negative [ ] diabetes [ ] thyroid problem  Heme/Lymph:      [ ] negative [ ] anemia [ ] bleeding problem  Allergic/Immune: [ ] negative [ ] itchy eyes [ ] nasal discharge [ ] hives [ ] angioedema    [ ] All other systems negative  [ ] Unable to assess ROS because ________.    MEDICATIONS:  MEDICATIONS  (STANDING):  atorvastatin 40 milliGRAM(s) Oral at bedtime  budesonide  80 MICROgram(s)/formoterol 4.5 MICROgram(s) Inhaler 2 Puff(s) Inhalation two times a day  chlorhexidine 2% Cloths 1 Application(s) Topical daily  heparin  Infusion 1400 Unit(s)/Hr (14 mL/Hr) IV Continuous <Continuous>  influenza  Vaccine (HIGH DOSE) 0.7 milliLiter(s) IntraMuscular once  montelukast 10 milliGRAM(s) Oral daily  tamsulosin 0.4 milliGRAM(s) Oral daily    MEDICATIONS  (PRN):      ALLERGIES:  Allergies    No Known Allergies    Intolerances        OBJECTIVE:  ICU Vital Signs Last 24 Hrs  T(C): 36.9 (25 Jan 2022 07:00), Max: 37.1 (24 Jan 2022 15:19)  T(F): 98.4 (25 Jan 2022 07:00), Max: 98.8 (24 Jan 2022 22:40)  HR: 61 (25 Jan 2022 07:00) (55 - 100)  BP: 167/93 (25 Jan 2022 07:00) (113/73 - 167/93)  BP(mean): 124 (25 Jan 2022 07:00) (83 - 124)  ABP: --  ABP(mean): --  RR: 29 (25 Jan 2022 07:00) (19 - 30)  SpO2: 96% (25 Jan 2022 07:00) (91% - 100%)      Adult Advanced Hemodynamics Last 24 Hrs  CVP(mm Hg): --  CVP(cm H2O): --  CO: --  CI: --  PA: --  PA(mean): --  PCWP: --  SVR: --  SVRI: --  PVR: --  PVRI: --  CAPILLARY BLOOD GLUCOSE        CAPILLARY BLOOD GLUCOSE        I&O's Summary    24 Jan 2022 07:01  -  25 Jan 2022 07:00  --------------------------------------------------------  IN: 112 mL / OUT: 350 mL / NET: -238 mL      Daily Height in cm: 175.26 (24 Jan 2022 14:36)    Daily     PHYSICAL EXAMINATION:  General: WN/WD NAD  HEENT: PERRLA, EOMI, moist mucous membranes  Neurology: A&Ox3, nonfocal, RON x 4  Respiratory: CTA B/L, normal respiratory effort, no wheezes, crackles, rales  CV: RRR, S1S2, no murmurs, rubs or gallops  Abdominal: Soft, NT, ND +BS, Last BM  Extremities: No edema, + peripheral pulses  Incisions:   Tubes:    LABS:                          12.1   5.12  )-----------( 189      ( 25 Jan 2022 04:21 )             36.3     01-25    140  |  108  |  28<H>  ----------------------------<  131<H>  3.5   |  18<L>  |  1.16    Ca    8.9      25 Jan 2022 04:21  Phos  2.5     01-25  Mg     1.8     01-25    TPro  5.8<L>  /  Alb  3.5  /  TBili  0.3  /  DBili  x   /  AST  18  /  ALT  12  /  AlkPhos  97  01-25    LIVER FUNCTIONS - ( 25 Jan 2022 04:21 )  Alb: 3.5 g/dL / Pro: 5.8 g/dL / ALK PHOS: 97 U/L / ALT: 12 U/L / AST: 18 U/L / GGT: x           PT/INR - ( 25 Jan 2022 04:21 )   PT: 14.1 sec;   INR: 1.18 ratio         PTT - ( 25 Jan 2022 04:21 )  PTT:76.5 sec            Neuro:  -AAO X3 at this time  -No neurologic deficit    Cardiac:  Submassive PE   - Hemodynamically stable   - CTPA 1/25 demonstrating b/l extensive PE w/ RV Strain   - TTE 1/25 normal EF but w/ RVE decr RVSF, interventricular flattening RA/RV diastolic collapse   -Elevated cardiac markers - trop (93-> 80), BNP (2729),  - Heparin gtt and close hemodynamic monitoring   - F/u Vasc Cards/Interventional for recs of possible intervention   - f/u LE Duplex     Hx of HTN   -Baseline HTN managed with amlodipine 10 mg, labetolol 200 mg BID, holding until further PE management.     Pulmonary:  Submassive PE   - Hemodynamically stable   - CTPA 1/25 demonstarting b/l extensive PE w/ RV Strain   - TTE 1/25 normal EF but w/ RVE decr RVSF, interventricular flattening RA/RV diastolic collapse   - No respiratory distress at this time w/ spO2 .93% on RA     Renal:  Non-oliguric RILEY - resolving   -potentially from obstructive compomemt but w/ rapid resolution  - now s/p contrast load, cont to monitor and support w/IVF and perfusing BPs MAP 65   - Strict Is Os     GI:   DASH Diet  - assess need for bowel regimen     ENDO:  - f/u A1c TSH     Heme  CBC Stable    VTE ppx  -Heparin gtt 1400U/hr and therapeutic - ptt goal 58-99     Infectious:  -No infectious etiology at this time  -Continue standard prophylactics.

## 2022-01-25 NOTE — CONSULT NOTE ADULT - SUBJECTIVE AND OBJECTIVE BOX
HPI:  CC: 75 y/o M with new onset exertional dyspnea for 1 week    HPI: 75 y/o M with h/o asthma, HTN, HLD, presenting with 1 week of worsening MARTINEZ. Over the past week, patient has noticed new onset MARTINEZ while walking his dog or going up the two flights of stairs in his house, with associated dizziness/lightheadedness. Previously no limit on ET. Denies cp/LE edema. Reports + cough for the past 2 weeks however c/w seasonal cough for him. Presented to his cardiologist today who noted abnormal ekg compared to prior so sent to ED. Of note, patient with natural; Covid infection 1/2021, not vaccinated post. Had rhinorrhea + new onset cough in end of december which self resolved.     In ED afeb hds, satting well on RA. Labs notable for Cr 1.7, Trop 93, BNP 2K, D-dimer 10K. EKG with new onset TWI v2-v3. No signs of RHS on EKG. Cardiology, PCP, and PERT team consulted. Started on hep gtt.  (24 Jan 2022 18:02)      PAST MEDICAL & SURGICAL HISTORY:  HTN (hypertension)    HLD (hyperlipidemia)    Asthma    2019 novel coronavirus disease (COVID-19)  1/2021    No significant past surgical history        Allergies    No Known Allergies    Intolerances        MEDICATIONS  (STANDING):  atorvastatin 40 milliGRAM(s) Oral at bedtime  budesonide  80 MICROgram(s)/formoterol 4.5 MICROgram(s) Inhaler 2 Puff(s) Inhalation two times a day  heparin  Infusion 1400 Unit(s)/Hr (14 mL/Hr) IV Continuous <Continuous>  influenza  Vaccine (HIGH DOSE) 0.7 milliLiter(s) IntraMuscular once  montelukast 10 milliGRAM(s) Oral daily  tamsulosin 0.4 milliGRAM(s) Oral daily    MEDICATIONS  (PRN):      FAMILY HISTORY:  No pertinent family history in first degree relatives        SOCIAL HISTORY: No EtOH, no tobacco    REVIEW OF SYSTEMS:    CONSTITUTIONAL: No weakness, fevers or chills  EYES/ENT: No visual changes;  No vertigo or throat pain   NECK: No pain or stiffness  RESPIRATORY: No cough, wheezing, hemoptysis; No shortness of breath  CARDIOVASCULAR: No chest pain or palpitations  GASTROINTESTINAL: No abdominal or epigastric pain. No nausea, vomiting, or hematemesis; No diarrhea or constipation. No melena or hematochezia.  GENITOURINARY: No dysuria, frequency or hematuria  NEUROLOGICAL: No numbness or weakness  SKIN: No itching, burning, rashes, or lesions   All other review of systems is negative unless indicated above.      Weight (kg): 84.6 (01-24 @ 16:36)    T(F): 97.9 (01-25-22 @ 11:00), Max: 98.8 (01-24-22 @ 22:40)  HR: 60 (01-25-22 @ 11:00)  BP: 143/92 (01-25-22 @ 11:00)  RR: 20 (01-25-22 @ 11:00)  SpO2: 96% (01-25-22 @ 11:00)  Wt(kg): --    GENERAL: NAD, well-developed  HEAD:  Atraumatic, Normocephalic  EYES: EOMI, PERRLA, conjunctiva and sclera clear  NECK: Supple, No JVD  CHEST/LUNG: Clear to auscultation bilaterally; No wheeze  HEART: Regular rate and rhythm; No murmurs, rubs, or gallops  ABDOMEN: Soft, Nontender, Nondistended; Bowel sounds present  EXTREMITIES:  2+ Peripheral Pulses, No clubbing, cyanosis, or edema  NEUROLOGY: non-focal  SKIN: No rashes or lesions                          12.1   5.12  )-----------( 189      ( 25 Jan 2022 04:21 )             36.3       01-25    140  |  108  |  28<H>  ----------------------------<  131<H>  3.5   |  18<L>  |  1.16    Ca    8.9      25 Jan 2022 04:21  Phos  2.5     01-25  Mg     1.8     01-25    TPro  5.8<L>  /  Alb  3.5  /  TBili  0.3  /  DBili  x   /  AST  18  /  ALT  12  /  AlkPhos  97  01-25      Magnesium, Serum: 1.8 mg/dL (01-25 @ 04:21)  Phosphorus Level, Serum: 2.5 mg/dL (01-25 @ 04:21)  Magnesium, Serum: 1.8 mg/dL (01-24 @ 23:26)  Phosphorus Level, Serum: 2.6 mg/dL (01-24 @ 23:26)  Magnesium, Serum: 1.9 mg/dL (01-24 @ 15:24)      PT/INR - ( 25 Jan 2022 04:21 )   PT: 14.1 sec;   INR: 1.18 ratio         PTT - ( 25 Jan 2022 09:15 )  PTT:91.3 sec     HPI:  CC: 77 y/o M with new onset exertional dyspnea for 1 week    HPI: 77 y/o M with h/o asthma, HTN, HLD, presenting with 1 week of worsening MARTINEZ. Over the past week, patient has noticed new onset MARTINEZ while walking his dog or going up the two flights of stairs in his house, with associated dizziness/lightheadedness. Previously no limit on ET. Denies cp/LE edema. Reports + cough for the past 2 weeks however c/w seasonal cough for him. Presented to his cardiologist today who noted abnormal ekg compared to prior so sent to ED. Of note, patient with natural; Covid infection 1/2021, not vaccinated post. Had rhinorrhea + new onset cough in end of december which self resolved.     In ED afeb hds, satting well on RA. Labs notable for Cr 1.7, Trop 93, BNP 2K, D-dimer 10K. EKG with new onset TWI v2-v3. No signs of RHS on EKG. Cardiology, PCP, and PERT team consulted. Started on hep gtt.  (24 Jan 2022 18:02)    Colonoscopy 8 years ago, reportedly normal and told did not need another. PSA checked regularly as has BPH and states has been within normal range. No personal or family history of clots.       PAST MEDICAL & SURGICAL HISTORY:  HTN (hypertension)    HLD (hyperlipidemia)    Asthma    2019 novel coronavirus disease (COVID-19)  1/2021    No significant past surgical history        Allergies    No Known Allergies    Intolerances        MEDICATIONS  (STANDING):  atorvastatin 40 milliGRAM(s) Oral at bedtime  budesonide  80 MICROgram(s)/formoterol 4.5 MICROgram(s) Inhaler 2 Puff(s) Inhalation two times a day  heparin  Infusion 1400 Unit(s)/Hr (14 mL/Hr) IV Continuous <Continuous>  influenza  Vaccine (HIGH DOSE) 0.7 milliLiter(s) IntraMuscular once  montelukast 10 milliGRAM(s) Oral daily  tamsulosin 0.4 milliGRAM(s) Oral daily    MEDICATIONS  (PRN):      FAMILY HISTORY:  No pertinent family history in first degree relatives        SOCIAL HISTORY: No EtOH, no tobacco    REVIEW OF SYSTEMS:    CONSTITUTIONAL: No weakness, fevers or chills  EYES/ENT: No visual changes;  No vertigo or throat pain   NECK: No pain or stiffness  RESPIRATORY: No cough, wheezing, hemoptysis; No shortness of breath  CARDIOVASCULAR: No chest pain or palpitations  GASTROINTESTINAL: No abdominal or epigastric pain. No nausea, vomiting, or hematemesis; No diarrhea or constipation. No melena or hematochezia.  GENITOURINARY: No dysuria, frequency or hematuria  NEUROLOGICAL: No numbness or weakness  SKIN: No itching, burning, rashes, or lesions   All other review of systems is negative unless indicated above.      Weight (kg): 84.6 (01-24 @ 16:36)    T(F): 97.9 (01-25-22 @ 11:00), Max: 98.8 (01-24-22 @ 22:40)  HR: 60 (01-25-22 @ 11:00)  BP: 143/92 (01-25-22 @ 11:00)  RR: 20 (01-25-22 @ 11:00)  SpO2: 96% (01-25-22 @ 11:00)  Wt(kg): --    GENERAL: NAD, well-developed  HEAD:  Atraumatic, Normocephalic  EYES: EOMI, PERRLA, conjunctiva and sclera clear  NECK: Supple, No JVD  CHEST/LUNG: Clear to auscultation bilaterally; No wheeze  HEART: Regular rate and rhythm; No murmurs, rubs, or gallops  ABDOMEN: Soft, Nontender, Nondistended; Bowel sounds present  EXTREMITIES:  2+ Peripheral Pulses, No clubbing, cyanosis, or edema  NEUROLOGY: non-focal  SKIN: No rashes or lesions                          12.1   5.12  )-----------( 189      ( 25 Jan 2022 04:21 )             36.3       01-25    140  |  108  |  28<H>  ----------------------------<  131<H>  3.5   |  18<L>  |  1.16    Ca    8.9      25 Jan 2022 04:21  Phos  2.5     01-25  Mg     1.8     01-25    TPro  5.8<L>  /  Alb  3.5  /  TBili  0.3  /  DBili  x   /  AST  18  /  ALT  12  /  AlkPhos  97  01-25      Magnesium, Serum: 1.8 mg/dL (01-25 @ 04:21)  Phosphorus Level, Serum: 2.5 mg/dL (01-25 @ 04:21)  Magnesium, Serum: 1.8 mg/dL (01-24 @ 23:26)  Phosphorus Level, Serum: 2.6 mg/dL (01-24 @ 23:26)  Magnesium, Serum: 1.9 mg/dL (01-24 @ 15:24)      PT/INR - ( 25 Jan 2022 04:21 )   PT: 14.1 sec;   INR: 1.18 ratio         PTT - ( 25 Jan 2022 09:15 )  PTT:91.3 sec

## 2022-01-25 NOTE — CONSULT NOTE ADULT - ATTENDING COMMENTS
LILY Amato is a 76 year old male with a recent history of MARTINEZ who was found to have a pulmonary embolism. He had been diagnosed to have COVID 19 18 months ago and apparently has recovered.  There is no history of coagulopathy, cancer, use of chemotherpay lengthy aircraft travel smoking trauma or surgical procedure  I agree with use of IV heparin at present with plans for transition to DOAC and outpatient follow up LILY Amato is a 76 year old male with a recent history of MARTINEZ who was found to have a pulmonary embolism. He had been diagnosed to have COVID 19 18 months ago and apparently has recovered.  There is no history of coagulopathy, cancer, use of chemotherapy lengthy aircraft travel smoking trauma or surgical procedure  I agree with use of IV heparin at present with plans for transition to DO AC and outpatient follow up

## 2022-01-25 NOTE — CONSULT NOTE ADULT - ASSESSMENT
75 yo M with hx of asthma, HTN, HLD, SIOBHAN presented with 1 week of MARTINEZ and found to have elevated d-dimer, trop, proBNP concerning for PE. TTE showed Lugo's sign, however, pt has ?RILEY and now has confirmatory CTPE-bilateral extensive.  NO evidence for copd/asthma exacerbation.  ***************************                   SEE Below:

## 2022-01-25 NOTE — CONSULT NOTE ADULT - SUBJECTIVE AND OBJECTIVE BOX
HPI:    PAST MEDICAL & SURGICAL HISTORY:  HTN (hypertension)    HLD (hyperlipidemia)    Asthma    2019 novel coronavirus disease (COVID-19)  1/2021    No significant past surgical history        FAMILY HISTORY:  No pertinent family history in first degree relatives        SOCIAL HISTORY:  Smoking: __ packs x ___ years  EtOH Use:  Marital Status:  Occupation:  Exposures:  Recent Travel:    Allergies    No Known Allergies    Intolerances        HOME MEDICATIONS:    REVIEW OF SYSTEMS:  Constitutional: No fevers or chills. No weight loss. No fatigue or generalized malaise.  Eyes: No itching or discharge from the eyes  ENT: No ear pain. No ear discharge. No nasal congestion. No post nasal drip. No epistaxis. No throat pain. No sore throat. No difficulty swallowing.   CV: No chest pain. No palpitations. No lightheadedness or dizziness.   Resp: No dyspnea at rest. No dyspnea on exertion. No orthopnea. No wheezing. No cough. No stridor. No sputum production. No chest pain with respiration.  GI: No nausea. No vomiting. No diarrhea.  MSK: No joint pain or pain in any extremities  Integumentary: No skin lesions. No pedal edema.  Neurological: No gross motor weakness. No sensory changes.    [ ] All other systems negative  [ ] Unable to assess ROS because ________    OBJECTIVE:  ICU Vital Signs Last 24 Hrs  T(C): 37.1 (24 Jan 2022 22:40), Max: 37.1 (24 Jan 2022 15:19)  T(F): 98.8 (24 Jan 2022 22:40), Max: 98.8 (24 Jan 2022 22:40)  HR: 61 (25 Jan 2022 03:00) (57 - 100)  BP: 122/75 (25 Jan 2022 03:00) (113/73 - 155/91)  BP(mean): 83 (25 Jan 2022 03:00) (83 - 112)  ABP: --  ABP(mean): --  RR: 28 (25 Jan 2022 03:00) (19 - 28)  SpO2: 91% (25 Jan 2022 03:00) (91% - 100%)        01-24 @ 07:01  -  01-25 @ 05:55  --------------------------------------------------------  IN: 14 mL / OUT: 0 mL / NET: 14 mL      CAPILLARY BLOOD GLUCOSE          PHYSICAL EXAM:  General: Awake, alert, oriented X 3.   HEENT: Atraumatic, normocephalic.                 Mallampatti Grade                 No nasal congestion.                No tonsillar or pharyngeal exudates.  Lymph Nodes: No palpable lymphadenopathy  Neck: No JVD. No carotid bruit.   Respiratory: Normal chest expansion                         Normal percussion                         Normal and equal air entry                         No wheeze, rhonchi or rales.  Cardiovascular: S1 S2 normal. No murmurs, rubs or gallops.   Abdomen: Soft, non-tender, non-distended. No organomegaly. Normoactive bowel sounds.  Extremities: Warm to touch. Peripheral pulse palpable. No pedal edema.   Skin: No rashes or skin lesions  Neurological: Motor and sensory examination equal and normal in all four extremities.  Psychiatry: Appropriate mood and affect.    HOSPITAL MEDICATIONS:  MEDICATIONS  (STANDING):  atorvastatin 40 milliGRAM(s) Oral at bedtime  budesonide  80 MICROgram(s)/formoterol 4.5 MICROgram(s) Inhaler 2 Puff(s) Inhalation two times a day  chlorhexidine 2% Cloths 1 Application(s) Topical daily  heparin  Infusion 1400 Unit(s)/Hr (14 mL/Hr) IV Continuous <Continuous>  influenza  Vaccine (HIGH DOSE) 0.7 milliLiter(s) IntraMuscular once  magnesium sulfate  IVPB 2 Gram(s) IV Intermittent once  montelukast 10 milliGRAM(s) Oral daily  potassium chloride    Tablet ER 20 milliEquivalent(s) Oral once  tamsulosin 0.4 milliGRAM(s) Oral daily    MEDICATIONS  (PRN):      LABS:                        12.1   5.12  )-----------( 189      ( 25 Jan 2022 04:21 )             36.3     01-25    140  |  108  |  28<H>  ----------------------------<  131<H>  3.5   |  18<L>  |  1.16    Ca    8.9      25 Jan 2022 04:21  Phos  2.5     01-25  Mg     1.8     01-25    TPro  5.8<L>  /  Alb  3.5  /  TBili  0.3  /  DBili  x   /  AST  18  /  ALT  12  /  AlkPhos  97  01-25    PT/INR - ( 25 Jan 2022 04:21 )   PT: 14.1 sec;   INR: 1.18 ratio         PTT - ( 25 Jan 2022 04:21 )  PTT:76.5 sec      Venous Blood Gas:  01-24 @ 15:24  7.37/43/40/25/62.4  VBG Lactate: 1.7      MICROBIOLOGY:     RADIOLOGY:  [ ] Reviewed and interpreted by me    Point of Care Ultrasound Findings;    PFT:    EKG:   HPI: 75 y/o M with h/o asthma, HTN, HLD, presenting with 1 week of worsening MARTINEZ. Over the past week, patient has noticed new onset MARTINEZ while walking his dog or going up the two flights of stairs in his house, with associated dizziness/lightheadedness. Previously no limit on ET. Denies cp/LE edema. Reports + cough for the past 2 weeks however c/w seasonal cough for him. Presented to his cardiologist 1/24 who noted abnormal ekg compared to prior so sent to ED. Of note, patient with natural; Covid infection 1/2021, not vaccinated post. Had rhinorrhea + new onset cough in end of December which self resolved.     In ED afeb hds, satting well on RA. Labs notable for Cr 1.7, Trop 93, BNP 2K, D-dimer 10K. EKG with new onset TWI v2-v3. No signs of RHS on EKG. Cardiology, PCP, and PERT team consulted. Started on hep gtt.  (24 Jan 2022 18:02)        PAST MEDICAL & SURGICAL HISTORY:  HTN (hypertension)    HLD (hyperlipidemia)    Asthma    2019 novel coronavirus disease (COVID-19)  1/2021    No significant past surgical history        FAMILY HISTORY:  No pertinent family history in first degree [id number], F-copd        SOCIAL HISTORY:  Smoking: __  1 packs x _30__ years  EtOH Use: soc  Marital Status: m  Occupation: retired  Exposures:  Recent Travel:    Allergies    No Known Allergies    Intolerances        HOME MEDICATIONS: Home Medications:  amLODIPine 10 mg oral tablet: 1 tab(s) orally once a day (24 Jan 2022 18:10)  atorvastatin 40 mg oral tablet: 1 tab(s) orally once a day (24 Jan 2022 18:11)  Breo Ellipta 100 mcg-25 mcg/inh inhalation powder: 1 puff(s) inhaled once a day (24 Jan 2022 18:11)  labetalol 200 mg oral tablet: 1 tab(s) orally 2 times a day (24 Jan 2022 18:10)  montelukast 10 mg oral tablet: 1 tab(s) orally once a day (24 Jan 2022 18:11)  tamsulosin 0.4 mg oral capsule: 1 cap(s) orally once a day (24 Jan 2022 18:11)  Ventolin HFA 90 mcg/inh inhalation aerosol: 2 puff(s) inhaled every 6 hours (24 Jan 2022 18:12)      REVIEW OF SYSTEMS:  Constitutional: No fevers or chills. No weight loss. + fatigue or generalized malaise.  Eyes: No itching or discharge from the eyes  ENT: No ear pain. No ear discharge. No nasal congestion. No post nasal drip. No epistaxis. No throat pain. No sore throat. No difficulty swallowing.   CV: No chest pain. No palpitations. No lightheadedness or dizziness.   Resp: No dyspnea at rest. ++ dyspnea on exertion. No orthopnea. No wheezing. No cough. No stridor. No sputum production. No chest pain with respiration.  GI: No nausea. No vomiting. No diarrhea.  MSK: No joint pain or pain in any extremities  Integumentary: No skin lesions. No pedal edema.  Neurological: No gross motor weakness. No sensory changes.    [ ] All other systems negative  [ ] Unable to assess ROS because ________    OBJECTIVE:  ICU Vital Signs Last 24 Hrs  T(C): 37.1 (24 Jan 2022 22:40), Max: 37.1 (24 Jan 2022 15:19)  T(F): 98.8 (24 Jan 2022 22:40), Max: 98.8 (24 Jan 2022 22:40)  HR: 61 (25 Jan 2022 03:00) (57 - 100)  BP: 122/75 (25 Jan 2022 03:00) (113/73 - 155/91)  BP(mean): 83 (25 Jan 2022 03:00) (83 - 112)  ABP: --  ABP(mean): --  RR: 28 (25 Jan 2022 03:00) (19 - 28)  SpO2: 91% (25 Jan 2022 03:00) (91% - 100%)        01-24 @ 07:01  -  01-25 @ 05:55  --------------------------------------------------------  IN: 14 mL / OUT: 0 mL / NET: 14 mL      CAPILLARY BLOOD GLUCOSE          PHYSICAL EXAM: NAD in bed on RA  General: Awake, alert, oriented X 3.   HEENT: Atraumatic, normocephalic.                 Mallampatti Grade 3                No nasal congestion.                No tonsillar or pharyngeal exudates.  Lymph Nodes: No palpable lymphadenopathy  Neck: No JVD. No carotid bruit.   Respiratory: Normal chest expansion                         Normal percussion                         Normal and equal air entry                         No wheeze, rhonchi or rales.  Cardiovascular: S1 S2 normal. No murmurs, rubs or gallops.   Abdomen: Soft, non-tender, non-distended. No organomegaly. Normoactive bowel sounds.  Extremities: Warm to touch. Peripheral pulse palpable. No pedal edema.   Skin: No rashes or skin lesions  Neurological: Motor and sensory examination equal and normal in all four extremities.  Psychiatry: Appropriate mood and affect.    HOSPITAL MEDICATIONS:  MEDICATIONS  (STANDING):  atorvastatin 40 milliGRAM(s) Oral at bedtime  budesonide  80 MICROgram(s)/formoterol 4.5 MICROgram(s) Inhaler 2 Puff(s) Inhalation two times a day  chlorhexidine 2% Cloths 1 Application(s) Topical daily  heparin  Infusion 1400 Unit(s)/Hr (14 mL/Hr) IV Continuous <Continuous>  influenza  Vaccine (HIGH DOSE) 0.7 milliLiter(s) IntraMuscular once  magnesium sulfate  IVPB 2 Gram(s) IV Intermittent once  montelukast 10 milliGRAM(s) Oral daily  potassium chloride    Tablet ER 20 milliEquivalent(s) Oral once  tamsulosin 0.4 milliGRAM(s) Oral daily    MEDICATIONS  (PRN):      LABS:                        12.1   5.12  )-----------( 189      ( 25 Jan 2022 04:21 )             36.3     01-25    140  |  108  |  28<H>  ----------------------------<  131<H>  3.5   |  18<L>  |  1.16    Ca    8.9      25 Jan 2022 04:21  Phos  2.5     01-25  Mg     1.8     01-25    TPro  5.8<L>  /  Alb  3.5  /  TBili  0.3  /  DBili  x   /  AST  18  /  ALT  12  /  AlkPhos  97  01-25    PT/INR - ( 25 Jan 2022 04:21 )   PT: 14.1 sec;   INR: 1.18 ratio         PTT - ( 25 Jan 2022 04:21 )  PTT:76.5 sec      Venous Blood Gas:  01-24 @ 15:24  7.37/43/40/25/62.4  VBG Lactate: 1.7      MICROBIOLOGY:     RADIOLOGY:   [ ] Reviewed and interpreted by me  < from: CT Angio Chest PE Protocol w/ IV Cont (01.25.22 @ 01:08) >    INTERPRETATION:  Extensvie bilateral acute pulmonary ebmoli as follows:    Right upper lobar which extends into the segmental branches    Right interlobar which extends into the middle and lower lobar branches   and segmental branches    Leftmain extending into the lower lobar and segmental branches    Left upper lobar extending into the segmental branches    There is evidence of right heart strain    Findings discussed by Macho GUARDADO with Sulema LOW on 1/25 at 1:29 AM      < end of copied text >    Point of Care Ultrasound Findings;    PFT:    EKG:

## 2022-01-25 NOTE — CONSULT NOTE ADULT - ASSESSMENT
77 y/o M with h/o asthma, HTN, HLD, presenting with 1 week of worsening MARTINEZ. Over the past week, patient has noticed new onset MARTINEZ while walking his dog or going up the two flights of stairs in his house, with associated dizziness/lightheadedness. Patient found to have massive b/l PE on CTA chest. Hematology consulted for further recommendations.     #Massive B/L PE   - presented with worsening MARTINEZ  - CTA Chest: Extensive bilateral lobar, segmental and subsegmental pulmonary emboli involving all 5 lobes with evidence of right heart strain.  - CT A/P pending  - PSA pending   - continue on heparin drip   - Can send APLS labs: lupus anticoagulant, DRVVT, silica clotting time, anticardiolipin antibodies, beta 2 glycoprotein antibodies       NOTE INCOMPLETE     Elliott Babin MD  Hematology/Oncology Fellow   Pager [phone]   After 5pm and on weekends page on call fellow      75 y/o M with h/o asthma, HTN, HLD, presenting with 1 week of worsening MARTINEZ. Over the past week, patient has noticed new onset MARTINEZ while walking his dog or going up the two flights of stairs in his house, with associated dizziness/lightheadedness. Patient found to have massive b/l PE on CTA chest. Hematology consulted for further recommendations.     #Massive B/L PE   - presented with worsening MARTINEZ  - no clear risk factors other than COVID infection however was over a year ago.   - will require anticoagulation for at least 6 months   - CTA Chest: Extensive bilateral lobar, segmental and subsegmental pulmonary emboli involving all 5 lobes with evidence of right heart strain.  - CT A/P pending  - PSA pending   - continue on heparin drip   - Can send APLS labs: lupus anticoagulant, DRVVT, silica clotting time, anticardiolipin antibodies, beta 2 glycoprotein antibodies   - if APLS labs unremarkable can be transitioned to oral agent such as a DOAC on discharge   - continue follow up for age appropriate cancer screenings   - patient can follow up at [facility] after discharge     Elliott Babin MD  Hematology/Oncology Fellow   Pager [phone]   After 5pm and on weekends page on call fellow

## 2022-01-25 NOTE — CONSULT NOTE ADULT - TIME BILLING
as above:  multifactorial dyspnea-underlying copd/asthma, CAD, and now extensive bilateral PE--RA sat above 90%  PE-on heparin at present--vasc cards involved--? thrombectomy   copd/asthma-symbicort 160, spiriva, singulair 10mg,  incentive spirometry/acapella  osas-out pt DD in progress  allergy-flonase 1 bid  cad--on mult meds  GI prophylaxis                        Abnormal CT--f/up final CTPA reports  Aris Shields MD-Pulmonary   [phone]

## 2022-01-26 ENCOUNTER — NON-APPOINTMENT (OUTPATIENT)
Age: 77
End: 2022-01-26

## 2022-01-26 LAB
ALBUMIN SERPL ELPH-MCNC: 3.6 G/DL — SIGNIFICANT CHANGE UP (ref 3.3–5)
ALP SERPL-CCNC: 100 U/L — SIGNIFICANT CHANGE UP (ref 40–120)
ALT FLD-CCNC: 14 U/L — SIGNIFICANT CHANGE UP (ref 10–45)
ANION GAP SERPL CALC-SCNC: 14 MMOL/L — SIGNIFICANT CHANGE UP (ref 5–17)
APTT BLD: 79.9 SEC — HIGH (ref 27.5–35.5)
AST SERPL-CCNC: 17 U/L — SIGNIFICANT CHANGE UP (ref 10–40)
BASOPHILS # BLD AUTO: 0.03 K/UL — SIGNIFICANT CHANGE UP (ref 0–0.2)
BASOPHILS NFR BLD AUTO: 0.6 % — SIGNIFICANT CHANGE UP (ref 0–2)
BILIRUB SERPL-MCNC: 0.4 MG/DL — SIGNIFICANT CHANGE UP (ref 0.2–1.2)
BUN SERPL-MCNC: 26 MG/DL — HIGH (ref 7–23)
CALCIUM SERPL-MCNC: 9 MG/DL — SIGNIFICANT CHANGE UP (ref 8.4–10.5)
CHLORIDE SERPL-SCNC: 110 MMOL/L — HIGH (ref 96–108)
CO2 SERPL-SCNC: 20 MMOL/L — LOW (ref 22–31)
CREAT SERPL-MCNC: 1.09 MG/DL — SIGNIFICANT CHANGE UP (ref 0.5–1.3)
DRVVT SCREEN TO CONFIRM RATIO: SIGNIFICANT CHANGE UP
EOSINOPHIL # BLD AUTO: 0.31 K/UL — SIGNIFICANT CHANGE UP (ref 0–0.5)
EOSINOPHIL NFR BLD AUTO: 6.4 % — HIGH (ref 0–6)
GLUCOSE SERPL-MCNC: 95 MG/DL — SIGNIFICANT CHANGE UP (ref 70–99)
HCT VFR BLD CALC: 37.3 % — LOW (ref 39–50)
HGB BLD-MCNC: 12.7 G/DL — LOW (ref 13–17)
IMM GRANULOCYTES NFR BLD AUTO: 0.4 % — SIGNIFICANT CHANGE UP (ref 0–1.5)
INR BLD: 1.19 RATIO — HIGH (ref 0.88–1.16)
LA NT DPL PPP QL: 42 SEC — SIGNIFICANT CHANGE UP
LACTATE SERPL-SCNC: 1 MMOL/L — SIGNIFICANT CHANGE UP (ref 0.7–2)
LYMPHOCYTES # BLD AUTO: 1.43 K/UL — SIGNIFICANT CHANGE UP (ref 1–3.3)
LYMPHOCYTES # BLD AUTO: 29.5 % — SIGNIFICANT CHANGE UP (ref 13–44)
MAGNESIUM SERPL-MCNC: 2.1 MG/DL — SIGNIFICANT CHANGE UP (ref 1.6–2.6)
MCHC RBC-ENTMCNC: 30.9 PG — SIGNIFICANT CHANGE UP (ref 27–34)
MCHC RBC-ENTMCNC: 34 GM/DL — SIGNIFICANT CHANGE UP (ref 32–36)
MCV RBC AUTO: 90.8 FL — SIGNIFICANT CHANGE UP (ref 80–100)
MONOCYTES # BLD AUTO: 0.54 K/UL — SIGNIFICANT CHANGE UP (ref 0–0.9)
MONOCYTES NFR BLD AUTO: 11.1 % — SIGNIFICANT CHANGE UP (ref 2–14)
NEUTROPHILS # BLD AUTO: 2.52 K/UL — SIGNIFICANT CHANGE UP (ref 1.8–7.4)
NEUTROPHILS NFR BLD AUTO: 52 % — SIGNIFICANT CHANGE UP (ref 43–77)
NORMALIZED SCT PPP-RTO: 0.93 RATIO — SIGNIFICANT CHANGE UP (ref 0–1.16)
NORMALIZED SCT PPP-RTO: SIGNIFICANT CHANGE UP
NRBC # BLD: 0 /100 WBCS — SIGNIFICANT CHANGE UP (ref 0–0)
NT-PROBNP SERPL-SCNC: 654 PG/ML — HIGH (ref 0–300)
PLATELET # BLD AUTO: 219 K/UL — SIGNIFICANT CHANGE UP (ref 150–400)
POTASSIUM SERPL-MCNC: 3.8 MMOL/L — SIGNIFICANT CHANGE UP (ref 3.5–5.3)
POTASSIUM SERPL-SCNC: 3.8 MMOL/L — SIGNIFICANT CHANGE UP (ref 3.5–5.3)
PROT SERPL-MCNC: 6.2 G/DL — SIGNIFICANT CHANGE UP (ref 6–8.3)
PROTHROM AB SERPL-ACNC: 14.2 SEC — HIGH (ref 10.6–13.6)
PSA FLD-MCNC: 9.04 NG/ML — HIGH (ref 0–4)
RBC # BLD: 4.11 M/UL — LOW (ref 4.2–5.8)
RBC # FLD: 12 % — SIGNIFICANT CHANGE UP (ref 10.3–14.5)
SODIUM SERPL-SCNC: 144 MMOL/L — SIGNIFICANT CHANGE UP (ref 135–145)
TROPONIN T, HIGH SENSITIVITY RESULT: 62 NG/L — HIGH (ref 0–51)
WBC # BLD: 4.85 K/UL — SIGNIFICANT CHANGE UP (ref 3.8–10.5)
WBC # FLD AUTO: 4.85 K/UL — SIGNIFICANT CHANGE UP (ref 3.8–10.5)

## 2022-01-26 PROCEDURE — 99232 SBSQ HOSP IP/OBS MODERATE 35: CPT

## 2022-01-26 PROCEDURE — 99233 SBSQ HOSP IP/OBS HIGH 50: CPT

## 2022-01-26 PROCEDURE — 74177 CT ABD & PELVIS W/CONTRAST: CPT | Mod: 26

## 2022-01-26 PROCEDURE — 93970 EXTREMITY STUDY: CPT | Mod: 26

## 2022-01-26 RX ADMIN — HEPARIN SODIUM 14 UNIT(S)/HR: 5000 INJECTION INTRAVENOUS; SUBCUTANEOUS at 20:04

## 2022-01-26 RX ADMIN — BUDESONIDE AND FORMOTEROL FUMARATE DIHYDRATE 2 PUFF(S): 160; 4.5 AEROSOL RESPIRATORY (INHALATION) at 17:10

## 2022-01-26 RX ADMIN — HEPARIN SODIUM 14 UNIT(S)/HR: 5000 INJECTION INTRAVENOUS; SUBCUTANEOUS at 04:07

## 2022-01-26 RX ADMIN — HEPARIN SODIUM 14 UNIT(S)/HR: 5000 INJECTION INTRAVENOUS; SUBCUTANEOUS at 07:50

## 2022-01-26 RX ADMIN — BUDESONIDE AND FORMOTEROL FUMARATE DIHYDRATE 2 PUFF(S): 160; 4.5 AEROSOL RESPIRATORY (INHALATION) at 05:49

## 2022-01-26 RX ADMIN — HEPARIN SODIUM 14 UNIT(S)/HR: 5000 INJECTION INTRAVENOUS; SUBCUTANEOUS at 22:52

## 2022-01-26 RX ADMIN — ATORVASTATIN CALCIUM 40 MILLIGRAM(S): 80 TABLET, FILM COATED ORAL at 21:43

## 2022-01-26 RX ADMIN — TIOTROPIUM BROMIDE 1 CAPSULE(S): 18 CAPSULE ORAL; RESPIRATORY (INHALATION) at 15:15

## 2022-01-26 RX ADMIN — MONTELUKAST 10 MILLIGRAM(S): 4 TABLET, CHEWABLE ORAL at 12:15

## 2022-01-26 RX ADMIN — TAMSULOSIN HYDROCHLORIDE 0.4 MILLIGRAM(S): 0.4 CAPSULE ORAL at 21:43

## 2022-01-26 NOTE — PROGRESS NOTE ADULT - ASSESSMENT
77 yo M with hx of asthma, HTN, HLD, SIOBHAN presented with 1 week of MARTINEZ and found to have elevated d-dimer, trop, proBNP concerning for PE. TTE showed Lugo's sign, however, pt has ?RILEY and now has confirmatory CTPE-bilateral extensive.  NO evidence for copd/asthma exacerbation.  ***************************                   SEE Below:   77 yo M with hx of asthma, HTN, HLD, SIOBHAN presented with 1 week of MARTINEZ and found to have elevated d-dimer, trop, proBNP concerning for PE. TTE showed Lugo's sign, however, pt has ?RILEY and now has confirmatory CTPE-bilateral extensive.  NO evidence for copd/asthma exacerbation.  ***************************                   SEE Below:  1/26-no additional intervention planned--xarelto planned as per vasc cards

## 2022-01-26 NOTE — PROGRESS NOTE ADULT - TIME BILLING
as above:  multifactorial dyspnea-underlying copd/asthma, CAD, and now extensive bilateral PE--RA sat above 90%  PE-on heparin at present--vasc cards involved--? thrombectomy   copd/asthma-symbicort 160, spiriva, singulair 10mg,  incentive spirometry/acapella  osas-out pt DD in progress  allergy-flonase 1 bid  cad--on mult meds  GI prophylaxis                        Abnormal CT--f/up final CTPA reports  Aris Shields MD-Pulmonary   [phone] as above: tx to monitored bed, vasc cards-xarelto to start  multifactorial dyspnea-underlying copd/asthma, CAD, and now extensive bilateral PE--RA sat above 90%  PE-on heparin at present--vasc cards involved--NO thrombectomy planned-xarelto planned  copd/asthma-symbicort 160, spiriva, singulair 10mg,  incentive spirometry/acapella  osas-out pt DD in progress  allergy-flonase 1 bid  cad--on mult meds  GI prophylaxis                        Abnormal CT--hypercoag w/up--CT a/p planned  DC planning   Aris Shields MD-Pulmonary   [phone]

## 2022-01-26 NOTE — PROGRESS NOTE ADULT - ASSESSMENT
77 yo M with hx of asthma, HTN, HLD, SIOBHAN presented with 1 week of MARTINEZ and found to have elevated d-dimer, trop, proBNP concerning for PE. TTE showed Lugo's sign, however, pt has ?RILEY and has not had confirmatory CTPE.    #PE  He has normal BP and HR, with normal O2 sat on room air  Continue heparin gtt for now  Given unprovoked event (covid was over a year ago), would suggest a CT abd/pelvis with PO and IV contrast to rule out malignancy  send PSA  Venous duplex    heparin gtt alone for now.   No plans for any catheter based therapies       [facility] [phone]

## 2022-01-26 NOTE — PROGRESS NOTE ADULT - SUBJECTIVE AND OBJECTIVE BOX
Vascular Cardiology Consult Note    SERVICE CONSULT: [phone]              OFFICE [phone]    CC:  SOB    Interval Events:  Denies C/P, SOB, LE pain or edema.  Reports ambulating to the bathroom without symptoms.      Allergies  No Known Allergies    MEDICATIONS  (STANDING):  atorvastatin 40 milliGRAM(s) Oral at bedtime  budesonide  80 MICROgram(s)/formoterol 4.5 MICROgram(s) Inhaler 2 Puff(s) Inhalation two times a day  heparin  Infusion 1400 Unit(s)/Hr (14 mL/Hr) IV Continuous <Continuous>  influenza  Vaccine (HIGH DOSE) 0.7 milliLiter(s) IntraMuscular once  montelukast 10 milliGRAM(s) Oral daily  tamsulosin 0.4 milliGRAM(s) Oral daily  tiotropium 18 MICROgram(s) Capsule 1 Capsule(s) Inhalation daily    PAST MEDICAL & SURGICAL HISTORY:  HTN (hypertension)  HLD (hyperlipidemia)  Asthma  2019 novel coronavirus disease (COVID-19)  1/2021    FAMILY HISTORY:  No pertinent family history in first degree relatives    SOCIAL HISTORY:  unchanged    REVIEW OF SYSTEMS:  CONSTITUTIONAL: No fever  EYES: No eye pain, visual disturbances  ENT:  No sinus or throat pain  NECK: No pain or stiffness  RESPIRATORY: Prior SOB   CARDIOVASCULAR: No C/P  GASTROINTESTINAL: No abdominal pain  GENITOURINARY: No hematuria  NEUROLOGICAL: No headaches, memory loss, loss of strength, numbness, or tremors  SKIN: No rash  LYMPH Nodes: No enlarged glands noted  ENDOCRINE: No heat or cold intolerance noted  MUSCULOSKELETAL: No LE edema  PSYCHIATRIC: No depression, anxiety  HEME/LYMPH: No  bleeding gums  ALLERGY AND IMMUNOLOGIC: No hives    [ x] All others negative	    PHYSICAL EXAM:  T(C): 36.6 (01-26-22 @ 08:30), Max: 37 (01-25-22 @ 20:21)  HR: 87 (01-26-22 @ 08:30) (63 - 87)  BP: 165/90 (01-26-22 @ 08:30) (144/82 - 166/93)  RR: 18 (01-26-22 @ 08:30) (18 - 18)  SpO2: 95% (01-26-22 @ 08:30) (92% - 95%)  I&O's Summary    25 Jan 2022 07:01  -  26 Jan 2022 07:00  --------------------------------------------------------  IN: 616 mL / OUT: 1650 mL / NET: -1034 mL    26 Jan 2022 07:01  -  26 Jan 2022 11:43  --------------------------------------------------------  IN: 310 mL / OUT: 0 mL / NET: 310 mL    Appearance: NAD 	  HEENT:  NC/AT  Cardiovascular: RRR, S1 and S2  Respiratory: CTA B/L  Psychiatry:  AAO x 3  Gastrointestinal:  Soft, Non-tender, + BS	  Skin: No rashes, No ecchymoses, No cyanosis	  Neurologic: no focal deficit  Extremities: no LE edema, bilateral calves soft         LABS:	 	    CBC Full  -  ( 26 Jan 2022 07:07 )  WBC Count : 4.85 K/uL  Hemoglobin : 12.7 g/dL  Hematocrit : 37.3 %  Platelet Count - Automated : 219 K/uL  Mean Cell Volume : 90.8 fl  Mean Cell Hemoglobin : 30.9 pg  Mean Cell Hemoglobin Concentration : 34.0 gm/dL  Auto Neutrophil # : 2.52 K/uL  Auto Lymphocyte # : 1.43 K/uL  Auto Monocyte # : 0.54 K/uL  Auto Eosinophil # : 0.31 K/uL  Auto Basophil # : 0.03 K/uL  Auto Neutrophil % : 52.0 %  Auto Lymphocyte % : 29.5 %  Auto Monocyte % : 11.1 %  Auto Eosinophil % : 6.4 %  Auto Basophil % : 0.6 %    01-26    144  |  110<H>  |  26<H>  ----------------------------<  95  3.8   |  20<L>  |  1.09  01-25    140  |  108  |  28<H>  ----------------------------<  131<H>  3.5   |  18<L>  |  1.16    Ca    9.0      26 Jan 2022 07:07  Ca    8.9      25 Jan 2022 04:21  Phos  2.5     01-25  Phos  2.6     01-24  Mg     2.1     01-26  Mg     1.8     01-25    TPro  6.2  /  Alb  3.6  /  TBili  0.4  /  DBili  x   /  AST  17  /  ALT  14  /  AlkPhos  100  01-26  TPro  5.8<L>  /  Alb  3.5  /  TBili  0.3  /  DBili  x   /  AST  18  /  ALT  12  /  AlkPhos  97  01-25    Assessment:  1. Bilateral PE      +Positive biomarkers       +RV strain  2. HTN  3. HLD  4. History of COVID-19  5. Asthma  6. History of SIOBHAN  7. RILEY - Resolved    Plan:  1. Currently on Heparin gtt, maintain therapeutic PTT.  2. Follow-up LE venous duplex to assess for DVT.  3. Follow-up CT Abdomen/Pelvis with PO and IV contrast and PSA for malignancy work-up.  4. Further recommendations to follow.    Thank you  DEVANTE Wick, MPAS  Vascular Cardiology Service    Please call with any questions:   Service Line: [phone]  Office [phone] Vascular Cardiology Consult Note    SERVICE CONSULT: [phone]              OFFICE [phone]    CC:  SOB    Interval Events:  Denies C/P, SOB, LE pain or edema.  Reports ambulating to the bathroom without symptoms.      Allergies  No Known Allergies    MEDICATIONS  (STANDING):  atorvastatin 40 milliGRAM(s) Oral at bedtime  budesonide  80 MICROgram(s)/formoterol 4.5 MICROgram(s) Inhaler 2 Puff(s) Inhalation two times a day  heparin  Infusion 1400 Unit(s)/Hr (14 mL/Hr) IV Continuous <Continuous>  influenza  Vaccine (HIGH DOSE) 0.7 milliLiter(s) IntraMuscular once  montelukast 10 milliGRAM(s) Oral daily  tamsulosin 0.4 milliGRAM(s) Oral daily  tiotropium 18 MICROgram(s) Capsule 1 Capsule(s) Inhalation daily    PAST MEDICAL & SURGICAL HISTORY:  HTN (hypertension)  HLD (hyperlipidemia)  Asthma  2019 novel coronavirus disease (COVID-19)  1/2021    FAMILY HISTORY:  No pertinent family history in first degree relatives    SOCIAL HISTORY:  unchanged    REVIEW OF SYSTEMS:  CONSTITUTIONAL: No fever  EYES: No eye pain, visual disturbances  ENT:  No sinus or throat pain  NECK: No pain or stiffness  RESPIRATORY: Prior SOB   CARDIOVASCULAR: No C/P  GASTROINTESTINAL: No abdominal pain  GENITOURINARY: No hematuria  NEUROLOGICAL: No headaches, memory loss, loss of strength, numbness, or tremors  SKIN: No rash  LYMPH Nodes: No enlarged glands noted  ENDOCRINE: No heat or cold intolerance noted  MUSCULOSKELETAL: No LE edema  PSYCHIATRIC: No depression, anxiety  HEME/LYMPH: No  bleeding gums  ALLERGY AND IMMUNOLOGIC: No hives    [ x] All others negative	    PHYSICAL EXAM:  T(C): 36.6 (01-26-22 @ 08:30), Max: 37 (01-25-22 @ 20:21)  HR: 87 (01-26-22 @ 08:30) (63 - 87)  BP: 165/90 (01-26-22 @ 08:30) (144/82 - 166/93)  RR: 18 (01-26-22 @ 08:30) (18 - 18)  SpO2: 95% (01-26-22 @ 08:30) (92% - 95%)  I&O's Summary    25 Jan 2022 07:01  -  26 Jan 2022 07:00  --------------------------------------------------------  IN: 616 mL / OUT: 1650 mL / NET: -1034 mL    26 Jan 2022 07:01  -  26 Jan 2022 11:43  --------------------------------------------------------  IN: 310 mL / OUT: 0 mL / NET: 310 mL    Appearance: NAD 	  HEENT:  NC/AT  Cardiovascular: RRR, S1 and S2  Respiratory: CTA B/L  Psychiatry:  AAO x 3  Gastrointestinal:  Soft, Non-tender, + BS	  Skin: No rashes, No ecchymoses, No cyanosis	  Neurologic: no focal deficit  Extremities: no LE edema, bilateral calves soft         LABS:	 	    CBC Full  -  ( 26 Jan 2022 07:07 )  WBC Count : 4.85 K/uL  Hemoglobin : 12.7 g/dL  Hematocrit : 37.3 %  Platelet Count - Automated : 219 K/uL  Mean Cell Volume : 90.8 fl  Mean Cell Hemoglobin : 30.9 pg  Mean Cell Hemoglobin Concentration : 34.0 gm/dL  Auto Neutrophil # : 2.52 K/uL  Auto Lymphocyte # : 1.43 K/uL  Auto Monocyte # : 0.54 K/uL  Auto Eosinophil # : 0.31 K/uL  Auto Basophil # : 0.03 K/uL  Auto Neutrophil % : 52.0 %  Auto Lymphocyte % : 29.5 %  Auto Monocyte % : 11.1 %  Auto Eosinophil % : 6.4 %  Auto Basophil % : 0.6 %    01-26    144  |  110<H>  |  26<H>  ----------------------------<  95  3.8   |  20<L>  |  1.09  01-25    140  |  108  |  28<H>  ----------------------------<  131<H>  3.5   |  18<L>  |  1.16    Ca    9.0      26 Jan 2022 07:07  Ca    8.9      25 Jan 2022 04:21  Phos  2.5     01-25  Phos  2.6     01-24  Mg     2.1     01-26  Mg     1.8     01-25    TPro  6.2  /  Alb  3.6  /  TBili  0.4  /  DBili  x   /  AST  17  /  ALT  14  /  AlkPhos  100  01-26  TPro  5.8<L>  /  Alb  3.5  /  TBili  0.3  /  DBili  x   /  AST  18  /  ALT  12  /  AlkPhos  97  01-25    Assessment:  1. Bilateral PE      +Positive biomarkers - down trending       +RV strain  2. HTN  3. HLD  4. History of COVID-19  5. Asthma  6. History of SIOBHAN  7. RILEY - Resolved    Plan:  1. Currently on Heparin gtt, maintain therapeutic PTT.      Currently hemodynamically stable on RA. Reports being asymptomatic on ambulation.   2. Follow-up LE venous duplex to assess for DVT.  3. Follow-up CT Abdomen/Pelvis with PO and IV contrast and PSA for malignancy work-up.  4. Pending above testing, will recommend transition to DOAC this evening. (Xarelto 15 mg BID x 21 days, then 20 mg daily with evening meal)  5. Recommend repeating TTE tomorrow to reassess RV function.   6. Home anti-hypertensives currently on hold in setting of submassive PE.  7. Appreciate Heme-onc consultation and hypercoagulable work-up.   8. Discussed with primary team.     Thank you  DEVANTE Wick, MPAS  Vascular Cardiology Service    Please call with any questions:   Service Line: [phone]  Office [phone]

## 2022-01-26 NOTE — PROGRESS NOTE ADULT - SUBJECTIVE AND OBJECTIVE BOX
CHIEF COMPLAINT: f/up sob, asthma/copd, allergic rhinitis, osas, PE--    Interval Events: vasc card-no additional intervention    REVIEW OF SYSTEMS:  Constitutional: No fevers or chills. No weight loss. No fatigue or generalized malaise.  Eyes: No itching or discharge from the eyes  ENT: No ear pain. No ear discharge. No nasal congestion. No post nasal drip. No epistaxis. No throat pain. No sore throat. No difficulty swallowing.   CV: No chest pain. No palpitations. No lightheadedness or dizziness.   Resp: No dyspnea at rest. No dyspnea on exertion. No orthopnea. No wheezing. No cough. No stridor. No sputum production. No chest pain with respiration.  GI: No nausea. No vomiting. No diarrhea.  MSK: No joint pain or pain in any extremities  Integumentary: No skin lesions. No pedal edema.  Neurological: No gross motor weakness. No sensory changes.  [ ] All other systems negative  [ ] Unable to assess ROS because ________    OBJECTIVE:  ICU Vital Signs Last 24 Hrs  T(C): 36.9 (26 Jan 2022 04:27), Max: 37 (25 Jan 2022 20:21)  T(F): 98.4 (26 Jan 2022 04:27), Max: 98.6 (25 Jan 2022 20:21)  HR: 63 (26 Jan 2022 04:27) (57 - 75)  BP: 144/82 (26 Jan 2022 04:27) (142/83 - 167/93)  BP(mean): 60 (25 Jan 2022 11:00) (60 - 124)  ABP: --  ABP(mean): --  RR: 18 (26 Jan 2022 04:27) (18 - 30)  SpO2: 94% (26 Jan 2022 04:27) (92% - 96%)        01-24 @ 07:01  -  01-25 @ 07:00  --------------------------------------------------------  IN: 112 mL / OUT: 350 mL / NET: -238 mL    01-25 @ 07:01  -  01-26 @ 05:28  --------------------------------------------------------  IN: 616 mL / OUT: 1250 mL / NET: -634 mL      CAPILLARY BLOOD GLUCOSE          PHYSICAL EXAM:  General: Awake, alert, oriented X 3.   HEENT: Atraumatic, normocephalic.                 Mallampatti Grade                 No nasal congestion.                No tonsillar or pharyngeal exudates.  Lymph Nodes: No palpable lymphadenopathy  Neck: No JVD. No carotid bruit.   Respiratory: Normal chest expansion                         Normal percussion                         Normal and equal air entry                         No wheeze, rhonchi or rales.  Cardiovascular: S1 S2 normal. No murmurs, rubs or gallops.   Abdomen: Soft, non-tender, non-distended. No organomegaly. Normoactive bowel sounds.  Extremities: Warm to touch. Peripheral pulse palpable. No pedal edema.   Skin: No rashes or skin lesions  Neurological: Motor and sensory examination equal and normal in all four extremities.  Psychiatry: Appropriate mood and affect.    HOSPITAL MEDICATIONS:  MEDICATIONS  (STANDING):  atorvastatin 40 milliGRAM(s) Oral at bedtime  budesonide  80 MICROgram(s)/formoterol 4.5 MICROgram(s) Inhaler 2 Puff(s) Inhalation two times a day  heparin  Infusion 1400 Unit(s)/Hr (14 mL/Hr) IV Continuous <Continuous>  influenza  Vaccine (HIGH DOSE) 0.7 milliLiter(s) IntraMuscular once  montelukast 10 milliGRAM(s) Oral daily  tamsulosin 0.4 milliGRAM(s) Oral daily  tiotropium 18 MICROgram(s) Capsule 1 Capsule(s) Inhalation daily    MEDICATIONS  (PRN):      LABS:                        12.1   5.12  )-----------( 189      ( 25 Jan 2022 04:21 )             36.3     01-25    140  |  108  |  28<H>  ----------------------------<  131<H>  3.5   |  18<L>  |  1.16    Ca    8.9      25 Jan 2022 04:21  Phos  2.5     01-25  Mg     1.8     01-25    TPro  5.8<L>  /  Alb  3.5  /  TBili  0.3  /  DBili  x   /  AST  18  /  ALT  12  /  AlkPhos  97  01-25    PT/INR - ( 25 Jan 2022 04:21 )   PT: 14.1 sec;   INR: 1.18 ratio         PTT - ( 25 Jan 2022 09:15 )  PTT:91.3 sec      Venous Blood Gas:  01-24 @ 15:24  7.37/43/40/25/62.4  VBG Lactate: 1.7      MICROBIOLOGY:     RADIOLOGY:  [ ] Reviewed and interpreted by me    Point of Care Ultrasound Findings:    PFT:    EKG: CHIEF COMPLAINT: f/up sob, asthma/copd, allergic rhinitis, osas, PE--some sob and light headed upon ambulation    Interval Events: vasc card-no additional intervention    REVIEW OF SYSTEMS:  Constitutional: No fevers or chills. No weight loss. No fatigue or generalized malaise.  Eyes: No itching or discharge from the eyes  ENT: No ear pain. No ear discharge. No nasal congestion. No post nasal drip. No epistaxis. No throat pain. No sore throat. No difficulty swallowing.   CV: No chest pain. No palpitations. No lightheadedness or dizziness.   Resp: No dyspnea at rest. + dyspnea on exertion. No orthopnea. No wheezing. No cough. No stridor. No sputum production. No chest pain with respiration.  GI: No nausea. No vomiting. No diarrhea.  MSK: No joint pain or pain in any extremities  Integumentary: No skin lesions. No pedal edema.  Neurological: No gross motor weakness. No sensory changes.  [+ ] All other systems negative  [ ] Unable to assess ROS because ________    OBJECTIVE:  ICU Vital Signs Last 24 Hrs  T(C): 36.9 (26 Jan 2022 04:27), Max: 37 (25 Jan 2022 20:21)  T(F): 98.4 (26 Jan 2022 04:27), Max: 98.6 (25 Jan 2022 20:21)  HR: 63 (26 Jan 2022 04:27) (57 - 75)  BP: 144/82 (26 Jan 2022 04:27) (142/83 - 167/93)  BP(mean): 60 (25 Jan 2022 11:00) (60 - 124)  ABP: --  ABP(mean): --  RR: 18 (26 Jan 2022 04:27) (18 - 30)  SpO2: 94% (26 Jan 2022 04:27) (92% - 96%)        01-24 @ 07:01  -  01-25 @ 07:00  --------------------------------------------------------  IN: 112 mL / OUT: 350 mL / NET: -238 mL    01-25 @ 07:01  -  01-26 @ 05:28  --------------------------------------------------------  IN: 616 mL / OUT: 1250 mL / NET: -634 mL      CAPILLARY BLOOD GLUCOSE          PHYSICAL EXAM: NAD in bed on RA  General: Awake, alert, oriented X 3.   HEENT: Atraumatic, normocephalic.                 Mallampatti Grade 3                No nasal congestion.                No tonsillar or pharyngeal exudates.  Lymph Nodes: No palpable lymphadenopathy  Neck: No JVD. No carotid bruit.   Respiratory: Normal chest expansion                         Normal percussion                         Normal and equal air entry                         No wheeze, rhonchi or rales.  Cardiovascular: S1 S2 normal. No murmurs, rubs or gallops.   Abdomen: Soft, non-tender, non-distended. No organomegaly. Normoactive bowel sounds.  Extremities: Warm to touch. Peripheral pulse palpable. No pedal edema.   Skin: No rashes or skin lesions  Neurological: Motor and sensory examination equal and normal in all four extremities.  Psychiatry: Appropriate mood and affect.    HOSPITAL MEDICATIONS:  MEDICATIONS  (STANDING):  atorvastatin 40 milliGRAM(s) Oral at bedtime  budesonide  80 MICROgram(s)/formoterol 4.5 MICROgram(s) Inhaler 2 Puff(s) Inhalation two times a day  heparin  Infusion 1400 Unit(s)/Hr (14 mL/Hr) IV Continuous <Continuous>  influenza  Vaccine (HIGH DOSE) 0.7 milliLiter(s) IntraMuscular once  montelukast 10 milliGRAM(s) Oral daily  tamsulosin 0.4 milliGRAM(s) Oral daily  tiotropium 18 MICROgram(s) Capsule 1 Capsule(s) Inhalation daily    MEDICATIONS  (PRN):      LABS:                        12.1   5.12  )-----------( 189      ( 25 Jan 2022 04:21 )             36.3     01-25    140  |  108  |  28<H>  ----------------------------<  131<H>  3.5   |  18<L>  |  1.16    Ca    8.9      25 Jan 2022 04:21  Phos  2.5     01-25  Mg     1.8     01-25    TPro  5.8<L>  /  Alb  3.5  /  TBili  0.3  /  DBili  x   /  AST  18  /  ALT  12  /  AlkPhos  97  01-25    PT/INR - ( 25 Jan 2022 04:21 )   PT: 14.1 sec;   INR: 1.18 ratio         PTT - ( 25 Jan 2022 09:15 )  PTT:91.3 sec      Venous Blood Gas:  01-24 @ 15:24  7.37/43/40/25/62.4  VBG Lactate: 1.7      MICROBIOLOGY:     RADIOLOGY:  [ ] Reviewed and interpreted by me    Point of Care Ultrasound Findings:    PFT:    EKG:

## 2022-01-26 NOTE — PROGRESS NOTE ADULT - SUBJECTIVE AND OBJECTIVE BOX
Patient is a 76y old  Male who presents with a chief complaint of SOB (26 Jan 2022 11:43)      DATE OF SERVICE: 01-26-22 @ 14:35    SUBJECTIVE / OVERNIGHT EVENTS: overnight events noted    ROS:  Resp: No cough no sputum production  CVS: No chest pain no palpitations no orthopnea  GI: no N/V/D  : no dysuria, no hematuria  Neuro: no weakness no paresthesias  Heme: No petechiae no easy bruising  Msk: No joint pain no swelling  Skin: No rash no itching        MEDICATIONS  (STANDING):  atorvastatin 40 milliGRAM(s) Oral at bedtime  budesonide  80 MICROgram(s)/formoterol 4.5 MICROgram(s) Inhaler 2 Puff(s) Inhalation two times a day  heparin  Infusion 1400 Unit(s)/Hr (14 mL/Hr) IV Continuous <Continuous>  influenza  Vaccine (HIGH DOSE) 0.7 milliLiter(s) IntraMuscular once  montelukast 10 milliGRAM(s) Oral daily  tamsulosin 0.4 milliGRAM(s) Oral daily  tiotropium 18 MICROgram(s) Capsule 1 Capsule(s) Inhalation daily    MEDICATIONS  (PRN):        CAPILLARY BLOOD GLUCOSE        I&O's Summary    25 Jan 2022 07:01  -  26 Jan 2022 07:00  --------------------------------------------------------  IN: 616 mL / OUT: 1650 mL / NET: -1034 mL    26 Jan 2022 07:01  -  26 Jan 2022 14:35  --------------------------------------------------------  IN: 620 mL / OUT: 0 mL / NET: 620 mL        Vital Signs Last 24 Hrs  T(C): 36.6 (26 Jan 2022 12:07), Max: 37 (25 Jan 2022 20:21)  T(F): 97.9 (26 Jan 2022 12:07), Max: 98.6 (25 Jan 2022 20:21)  HR: 72 (26 Jan 2022 12:07) (63 - 87)  BP: 168/93 (26 Jan 2022 12:07) (144/82 - 188/99)  BP(mean): --  RR: 18 (26 Jan 2022 12:07) (18 - 18)  SpO2: 96% (26 Jan 2022 12:07) (92% - 98%)    PHYSICAL EXAM:  GENERAL: in no distress  EYES: EOMI, PERRLA,  NECK: Supple, No JVD  CHEST/LUNG: clear   HEART: S1 S2; no murmurs   ABDOMEN: Soft, Nontender  EXTREMITIES:   no edema  NEUROLOGY: AO x 3 non-focal  SKIN: No rashes or lesions    LABS:                        12.7   4.85  )-----------( 219      ( 26 Jan 2022 07:07 )             37.3     01-26    144  |  110<H>  |  26<H>  ----------------------------<  95  3.8   |  20<L>  |  1.09    Ca    9.0      26 Jan 2022 07:07  Phos  2.5     01-25  Mg     2.1     01-26    TPro  6.2  /  Alb  3.6  /  TBili  0.4  /  DBili  x   /  AST  17  /  ALT  14  /  AlkPhos  100  01-26    PT/INR - ( 26 Jan 2022 07:07 )   PT: 14.2 sec;   INR: 1.19 ratio         PTT - ( 26 Jan 2022 07:07 )  PTT:79.9 sec            All consultant(s) notes reviewed and care discussed with other providers        Contact Number, Dr Crespo [phone]

## 2022-01-26 NOTE — PROGRESS NOTE ADULT - ATTENDING COMMENTS
Patient is doing well, and ambulating without symptoms  CT abdomen / pelvis shows no malignancy  Would consider transition to Xarelto 15mg BID this evening if ok with hematology       [facility] [phone]

## 2022-01-27 LAB
ANION GAP SERPL CALC-SCNC: 13 MMOL/L — SIGNIFICANT CHANGE UP (ref 5–17)
APTT BLD: 70.7 SEC — HIGH (ref 27.5–35.5)
BUN SERPL-MCNC: 22 MG/DL — SIGNIFICANT CHANGE UP (ref 7–23)
CALCIUM SERPL-MCNC: 9.4 MG/DL — SIGNIFICANT CHANGE UP (ref 8.4–10.5)
CHLORIDE SERPL-SCNC: 110 MMOL/L — HIGH (ref 96–108)
CO2 SERPL-SCNC: 21 MMOL/L — LOW (ref 22–31)
CREAT SERPL-MCNC: 1.13 MG/DL — SIGNIFICANT CHANGE UP (ref 0.5–1.3)
GLUCOSE SERPL-MCNC: 101 MG/DL — HIGH (ref 70–99)
HCT VFR BLD CALC: 39.3 % — SIGNIFICANT CHANGE UP (ref 39–50)
HGB BLD-MCNC: 12.8 G/DL — LOW (ref 13–17)
INR BLD: 1.15 RATIO — SIGNIFICANT CHANGE UP (ref 0.88–1.16)
LACTATE SERPL-SCNC: 1 MMOL/L — SIGNIFICANT CHANGE UP (ref 0.7–2)
MAGNESIUM SERPL-MCNC: 1.9 MG/DL — SIGNIFICANT CHANGE UP (ref 1.6–2.6)
MCHC RBC-ENTMCNC: 30 PG — SIGNIFICANT CHANGE UP (ref 27–34)
MCHC RBC-ENTMCNC: 32.6 GM/DL — SIGNIFICANT CHANGE UP (ref 32–36)
MCV RBC AUTO: 92 FL — SIGNIFICANT CHANGE UP (ref 80–100)
NRBC # BLD: 0 /100 WBCS — SIGNIFICANT CHANGE UP (ref 0–0)
NT-PROBNP SERPL-SCNC: 258 PG/ML — SIGNIFICANT CHANGE UP (ref 0–300)
PHOSPHATE SERPL-MCNC: 3.4 MG/DL — SIGNIFICANT CHANGE UP (ref 2.5–4.5)
PLATELET # BLD AUTO: 237 K/UL — SIGNIFICANT CHANGE UP (ref 150–400)
POTASSIUM SERPL-MCNC: 3.5 MMOL/L — SIGNIFICANT CHANGE UP (ref 3.5–5.3)
POTASSIUM SERPL-SCNC: 3.5 MMOL/L — SIGNIFICANT CHANGE UP (ref 3.5–5.3)
PROTHROM AB SERPL-ACNC: 13.7 SEC — HIGH (ref 10.6–13.6)
RBC # BLD: 4.27 M/UL — SIGNIFICANT CHANGE UP (ref 4.2–5.8)
RBC # FLD: 12.1 % — SIGNIFICANT CHANGE UP (ref 10.3–14.5)
SODIUM SERPL-SCNC: 144 MMOL/L — SIGNIFICANT CHANGE UP (ref 135–145)
TROPONIN T, HIGH SENSITIVITY RESULT: 44 NG/L — SIGNIFICANT CHANGE UP (ref 0–51)
WBC # BLD: 4.8 K/UL — SIGNIFICANT CHANGE UP (ref 3.8–10.5)
WBC # FLD AUTO: 4.8 K/UL — SIGNIFICANT CHANGE UP (ref 3.8–10.5)

## 2022-01-27 PROCEDURE — 99232 SBSQ HOSP IP/OBS MODERATE 35: CPT

## 2022-01-27 PROCEDURE — 99233 SBSQ HOSP IP/OBS HIGH 50: CPT

## 2022-01-27 RX ORDER — RIVAROXABAN 15 MG-20MG
1 KIT ORAL
Qty: 51 | Refills: 0
Start: 2022-01-27

## 2022-01-27 RX ORDER — RIVAROXABAN 15 MG-20MG
15 KIT ORAL
Refills: 0 | Status: DISCONTINUED | OUTPATIENT
Start: 2022-01-27 | End: 2022-01-28

## 2022-01-27 RX ORDER — IPRATROPIUM/ALBUTEROL SULFATE 18-103MCG
3 AEROSOL WITH ADAPTER (GRAM) INHALATION EVERY 6 HOURS
Refills: 0 | Status: DISCONTINUED | OUTPATIENT
Start: 2022-01-27 | End: 2022-01-28

## 2022-01-27 RX ADMIN — TAMSULOSIN HYDROCHLORIDE 0.4 MILLIGRAM(S): 0.4 CAPSULE ORAL at 22:02

## 2022-01-27 RX ADMIN — TIOTROPIUM BROMIDE 1 CAPSULE(S): 18 CAPSULE ORAL; RESPIRATORY (INHALATION) at 11:35

## 2022-01-27 RX ADMIN — MONTELUKAST 10 MILLIGRAM(S): 4 TABLET, CHEWABLE ORAL at 11:34

## 2022-01-27 RX ADMIN — BUDESONIDE AND FORMOTEROL FUMARATE DIHYDRATE 2 PUFF(S): 160; 4.5 AEROSOL RESPIRATORY (INHALATION) at 05:02

## 2022-01-27 RX ADMIN — ATORVASTATIN CALCIUM 40 MILLIGRAM(S): 80 TABLET, FILM COATED ORAL at 22:02

## 2022-01-27 RX ADMIN — RIVAROXABAN 15 MILLIGRAM(S): KIT at 10:31

## 2022-01-27 RX ADMIN — RIVAROXABAN 15 MILLIGRAM(S): KIT at 17:30

## 2022-01-27 RX ADMIN — BUDESONIDE AND FORMOTEROL FUMARATE DIHYDRATE 2 PUFF(S): 160; 4.5 AEROSOL RESPIRATORY (INHALATION) at 17:32

## 2022-01-27 RX ADMIN — Medication 3 MILLILITER(S): at 05:01

## 2022-01-27 RX ADMIN — HEPARIN SODIUM 14 UNIT(S)/HR: 5000 INJECTION INTRAVENOUS; SUBCUTANEOUS at 07:33

## 2022-01-27 RX ADMIN — HEPARIN SODIUM 14 UNIT(S)/HR: 5000 INJECTION INTRAVENOUS; SUBCUTANEOUS at 09:05

## 2022-01-27 RX ADMIN — Medication 200 MILLIGRAM(S): at 05:02

## 2022-01-27 NOTE — PROGRESS NOTE ADULT - SUBJECTIVE AND OBJECTIVE BOX
CHIEF COMPLAINT: f/up sob, asthma/copd, allergic rhinitis, osas, PE--    Interval Events:    REVIEW OF SYSTEMS:  Constitutional: No fevers or chills. No weight loss. No fatigue or generalized malaise.  Eyes: No itching or discharge from the eyes  ENT: No ear pain. No ear discharge. No nasal congestion. No post nasal drip. No epistaxis. No throat pain. No sore throat. No difficulty swallowing.   CV: No chest pain. No palpitations. No lightheadedness or dizziness.   Resp: No dyspnea at rest. No dyspnea on exertion. No orthopnea. No wheezing. No cough. No stridor. No sputum production. No chest pain with respiration.  GI: No nausea. No vomiting. No diarrhea.  MSK: No joint pain or pain in any extremities  Integumentary: No skin lesions. No pedal edema.  Neurological: No gross motor weakness. No sensory changes.  [ ] All other systems negative  [ ] Unable to assess ROS because ________    OBJECTIVE:  ICU Vital Signs Last 24 Hrs  T(C): 36.7 (27 Jan 2022 04:00), Max: 37.1 (26 Jan 2022 20:10)  T(F): 98.1 (27 Jan 2022 04:00), Max: 98.7 (26 Jan 2022 20:10)  HR: 71 (27 Jan 2022 04:00) (66 - 87)  BP: 156/90 (27 Jan 2022 04:00) (152/85 - 188/99)  BP(mean): --  ABP: --  ABP(mean): --  RR: 18 (27 Jan 2022 04:00) (18 - 18)  SpO2: 94% (27 Jan 2022 04:00) (93% - 98%)        01-25 @ 07:01 - 01-26 @ 07:00  --------------------------------------------------------  IN: 616 mL / OUT: 1650 mL / NET: -1034 mL    01-26 @ 07:01 - 01-27 @ 04:55  --------------------------------------------------------  IN: 860 mL / OUT: 900 mL / NET: -40 mL      CAPILLARY BLOOD GLUCOSE          PHYSICAL EXAM:  General: Awake, alert, oriented X 3.   HEENT: Atraumatic, normocephalic.                 Mallampatti Grade                 No nasal congestion.                No tonsillar or pharyngeal exudates.  Lymph Nodes: No palpable lymphadenopathy  Neck: No JVD. No carotid bruit.   Respiratory: Normal chest expansion                         Normal percussion                         Normal and equal air entry                         No wheeze, rhonchi or rales.  Cardiovascular: S1 S2 normal. No murmurs, rubs or gallops.   Abdomen: Soft, non-tender, non-distended. No organomegaly. Normoactive bowel sounds.  Extremities: Warm to touch. Peripheral pulse palpable. No pedal edema.   Skin: No rashes or skin lesions  Neurological: Motor and sensory examination equal and normal in all four extremities.  Psychiatry: Appropriate mood and affect.    HOSPITAL MEDICATIONS:  MEDICATIONS  (STANDING):  atorvastatin 40 milliGRAM(s) Oral at bedtime  budesonide  80 MICROgram(s)/formoterol 4.5 MICROgram(s) Inhaler 2 Puff(s) Inhalation two times a day  heparin  Infusion 1400 Unit(s)/Hr (14 mL/Hr) IV Continuous <Continuous>  influenza  Vaccine (HIGH DOSE) 0.7 milliLiter(s) IntraMuscular once  montelukast 10 milliGRAM(s) Oral daily  tamsulosin 0.4 milliGRAM(s) Oral daily  tiotropium 18 MICROgram(s) Capsule 1 Capsule(s) Inhalation daily    MEDICATIONS  (PRN):  albuterol/ipratropium for Nebulization 3 milliLiter(s) Nebulizer every 6 hours PRN Bronchospasm  guaiFENesin Oral Liquid (Sugar-Free) 200 milliGRAM(s) Oral every 6 hours PRN Cough      LABS:                        12.7   4.85  )-----------( 219      ( 26 Jan 2022 07:07 )             37.3     01-26    144  |  110<H>  |  26<H>  ----------------------------<  95  3.8   |  20<L>  |  1.09    Ca    9.0      26 Jan 2022 07:07  Mg     2.1     01-26    TPro  6.2  /  Alb  3.6  /  TBili  0.4  /  DBili  x   /  AST  17  /  ALT  14  /  AlkPhos  100  01-26    PT/INR - ( 26 Jan 2022 07:07 )   PT: 14.2 sec;   INR: 1.19 ratio         PTT - ( 26 Jan 2022 07:07 )  PTT:79.9 sec          MICROBIOLOGY:     RADIOLOGY: < from: CT Abdomen and Pelvis w/ IV Cont (01.26.22 @ 14:54) >  PROCEDURE:  CT of the Abdomen and Pelvis was performed.  Sagittal and coronal reformats were performed.    FINDINGS:  LOWER CHEST: Redemonstration of previously seen right ventricular   enlargement.    LIVER: Within normal limits.  BILE DUCTS: Normal caliber.  GALLBLADDER: Within normal limits.  SPLEEN: Within normal limits.  PANCREAS: Within normal limits.  ADRENALS: Redemonstration of 1.1 cm left adrenal myelolipoma.  KIDNEYS/URETERS: Multiple bilateral simple renal cysts.    BLADDER: Within normal limits.  REPRODUCTIVE ORGANS: Prostate is enlarged.    BOWEL: No bowel obstruction. Appendix is not visualized. No evidence of   inflammation in the pericecal region.  PERITONEUM: No ascites.  VESSELS: Within normal limits.  RETROPERITONEUM/LYMPH NODES: No lymphadenopathy.  ABDOMINAL WALL: Left-sided bowel and fat containing inguinal hernia.  BONES: Within normal limits.    IMPRESSION:  No evidence of intra-abdominal malignancy.    Left-sided down fat-containing inguinal hernia.    No other acute intra-abdominal pathology.    < from: [facility] Duplex Lower Ext Vein Scan, Bilat (01.26.22 @ 16:51) >      INTERPRETATION:  CLINICAL INFORMATION: Lower extremity swelling. Evaluate   for DVT. History of pulmonary emboli.    COMPARISON: 6/9/2021.    TECHNIQUE: Duplex sonography of the BILATERAL LOWER extremity veins with   color and spectral Doppler, with and without compression.    FINDINGS:    RIGHT:  Normal compressibility of the RIGHT common femoral, femoral and popliteal   veins.  Doppler examination shows normal spontaneous and phasic flow.  No RIGHT calf vein thrombosis is detected.    LEFT:  Normal compressibility of the LEFT common femoral and femoral veins.  Acute DVT is identified within the left popliteal vein, tibioperoneal   trunk and soleal vein.  There is patency of the left posterior tibial vein and left peroneal vein.    IMPRESSION:  Acute DVT is identified within the left popliteal vein, tibioperoneal   trunk and soleal vein. No evidence for right lower extremity DVT.          < end of copied text >  < end of copied text >    [ ] Reviewed and interpreted by me    Point of Care Ultrasound Findings:    PFT:    EKG: CHIEF COMPLAINT: f/up sob, asthma/copd, allergic rhinitis, osas, PE--better over all- one episode of coughing and mucus production    Interval Events: ct /p wnl but doppler c/w LLE DVT    REVIEW OF SYSTEMS:  Constitutional: No fevers or chills. No weight loss. No fatigue or generalized malaise.  Eyes: No itching or discharge from the eyes  ENT: No ear pain. No ear discharge. No nasal congestion. No post nasal drip. No epistaxis. No throat pain. No sore throat. No difficulty swallowing.   CV: No chest pain. No palpitations. No lightheadedness or dizziness.   Resp: No dyspnea at rest. No dyspnea on exertion. No orthopnea. No wheezing. + cough. No stridor. + sputum production. No chest pain with respiration.  GI: No nausea. No vomiting. No diarrhea.  MSK: No joint pain or pain in any extremities  Integumentary: No skin lesions. No pedal edema.  Neurological: No gross motor weakness. No sensory changes.  [+ ] All other systems negative  [ ] Unable to assess ROS because ________    OBJECTIVE:  ICU Vital Signs Last 24 Hrs  T(C): 36.7 (27 Jan 2022 04:00), Max: 37.1 (26 Jan 2022 20:10)  T(F): 98.1 (27 Jan 2022 04:00), Max: 98.7 (26 Jan 2022 20:10)  HR: 71 (27 Jan 2022 04:00) (66 - 87)  BP: 156/90 (27 Jan 2022 04:00) (152/85 - 188/99)  BP(mean): --  ABP: --  ABP(mean): --  RR: 18 (27 Jan 2022 04:00) (18 - 18)  SpO2: 94% (27 Jan 2022 04:00) (93% - 98%)        01-25 @ 07:01  -  01-26 @ 07:00  --------------------------------------------------------  IN: 616 mL / OUT: 1650 mL / NET: -1034 mL    01-26 @ 07:01 - 01-27 @ 04:55  --------------------------------------------------------  IN: 860 mL / OUT: 900 mL / NET: -40 mL      CAPILLARY BLOOD GLUCOSE          PHYSICAL EXAM: NAD in bed on RA  General: Awake, alert, oriented X 3.   HEENT: Atraumatic, normocephalic.                 Mallampatti Grade 3                No nasal congestion.                No tonsillar or pharyngeal exudates.  Lymph Nodes: No palpable lymphadenopathy  Neck: No JVD. No carotid bruit.   Respiratory: Normal chest expansion                         Normal percussion                         Normal and equal air entry                         No wheeze, rhonchi or rales.  Cardiovascular: S1 S2 normal. No murmurs, rubs or gallops.   Abdomen: Soft, non-tender, non-distended. No organomegaly. Normoactive bowel sounds.  Extremities: Warm to touch. Peripheral pulse palpable. No pedal edema.   Skin: No rashes or skin lesions  Neurological: Motor and sensory examination equal and normal in all four extremities.  Psychiatry: Appropriate mood and affect.    HOSPITAL MEDICATIONS:  MEDICATIONS  (STANDING):  atorvastatin 40 milliGRAM(s) Oral at bedtime  budesonide  80 MICROgram(s)/formoterol 4.5 MICROgram(s) Inhaler 2 Puff(s) Inhalation two times a day  heparin  Infusion 1400 Unit(s)/Hr (14 mL/Hr) IV Continuous <Continuous>  influenza  Vaccine (HIGH DOSE) 0.7 milliLiter(s) IntraMuscular once  montelukast 10 milliGRAM(s) Oral daily  tamsulosin 0.4 milliGRAM(s) Oral daily  tiotropium 18 MICROgram(s) Capsule 1 Capsule(s) Inhalation daily    MEDICATIONS  (PRN):  albuterol/ipratropium for Nebulization 3 milliLiter(s) Nebulizer every 6 hours PRN Bronchospasm  guaiFENesin Oral Liquid (Sugar-Free) 200 milliGRAM(s) Oral every 6 hours PRN Cough      LABS:                        12.7   4.85  )-----------( 219      ( 26 Jan 2022 07:07 )             37.3     01-26    144  |  110<H>  |  26<H>  ----------------------------<  95  3.8   |  20<L>  |  1.09    Ca    9.0      26 Jan 2022 07:07  Mg     2.1     01-26    TPro  6.2  /  Alb  3.6  /  TBili  0.4  /  DBili  x   /  AST  17  /  ALT  14  /  AlkPhos  100  01-26    PT/INR - ( 26 Jan 2022 07:07 )   PT: 14.2 sec;   INR: 1.19 ratio         PTT - ( 26 Jan 2022 07:07 )  PTT:79.9 sec          MICROBIOLOGY:     RADIOLOGY: < from: CT Abdomen and Pelvis w/ IV Cont (01.26.22 @ 14:54) >  PROCEDURE:  CT of the Abdomen and Pelvis was performed.  Sagittal and coronal reformats were performed.    FINDINGS:  LOWER CHEST: Redemonstration of previously seen right ventricular   enlargement.    LIVER: Within normal limits.  BILE DUCTS: Normal caliber.  GALLBLADDER: Within normal limits.  SPLEEN: Within normal limits.  PANCREAS: Within normal limits.  ADRENALS: Redemonstration of 1.1 cm left adrenal myelolipoma.  KIDNEYS/URETERS: Multiple bilateral simple renal cysts.    BLADDER: Within normal limits.  REPRODUCTIVE ORGANS: Prostate is enlarged.    BOWEL: No bowel obstruction. Appendix is not visualized. No evidence of   inflammation in the pericecal region.  PERITONEUM: No ascites.  VESSELS: Within normal limits.  RETROPERITONEUM/LYMPH NODES: No lymphadenopathy.  ABDOMINAL WALL: Left-sided bowel and fat containing inguinal hernia.  BONES: Within normal limits.    IMPRESSION:  No evidence of intra-abdominal malignancy.    Left-sided down fat-containing inguinal hernia.    No other acute intra-abdominal pathology.    < from: VA Duplex Lower Ext Vein Scan, Bilat (01.26.22 @ 16:51) >      INTERPRETATION:  CLINICAL INFORMATION: Lower extremity swelling. Evaluate   for DVT. History of pulmonary emboli.    COMPARISON: 6/9/2021.    TECHNIQUE: Duplex sonography of the BILATERAL LOWER extremity veins with   color and spectral Doppler, with and without compression.    FINDINGS:    RIGHT:  Normal compressibility of the RIGHT common femoral, femoral and popliteal   veins.  Doppler examination shows normal spontaneous and phasic flow.  No RIGHT calf vein thrombosis is detected.    LEFT:  Normal compressibility of the LEFT common femoral and femoral veins.  Acute DVT is identified within the left popliteal vein, tibioperoneal   trunk and soleal vein.  There is patency of the left posterior tibial vein and left peroneal vein.    IMPRESSION:  Acute DVT is identified within the left popliteal vein, tibioperoneal   trunk and soleal vein. No evidence for right lower extremity DVT.          < end of copied text >  < end of copied text >    [ ] Reviewed and interpreted by me    Point of Care Ultrasound Findings:    PFT:    EKG:

## 2022-01-27 NOTE — PROGRESS NOTE ADULT - ASSESSMENT
75 yo M with hx of asthma, HTN, HLD, SIOBHAN presented with 1 week of MARTINEZ and found to have elevated d-dimer, trop, proBNP concerning for PE. TTE showed Lugo's sign, however, pt has ?RILEY and now has confirmatory CTPE-bilateral extensive.  NO evidence for copd/asthma exacerbation.  ***************************                   SEE Below:  1/26-no additional intervention planned--xarelto planned as per vasc cards  1/27-Left DVT confirmed but CT a/p normal-on heparin   75 yo M with hx of asthma, HTN, HLD, SIOBHAN presented with 1 week of MARTINEZ and found to have elevated d-dimer, trop, proBNP concerning for PE. TTE showed Lugo's sign, however, pt has ?RILEY and now has confirmatory CTPE-bilateral extensive.  NO evidence for copd/asthma exacerbation.  ***************************                   SEE Below:  1/26-no additional intervention planned--xarelto planned as per vasc cards  1/27-Left DVT confirmed but CT a/p normal-on heparin; heme evaln in progress

## 2022-01-27 NOTE — PROGRESS NOTE ADULT - PROBLEM SELECTOR PLAN 3
will continue to monitor  not on any meds  likely start amlodipine tomorrow   discussed with cardiology

## 2022-01-27 NOTE — PROGRESS NOTE ADULT - TIME BILLING
as above: stable on IV heparin- vasc cards-xarelto to start  multifactorial dyspnea-underlying copd/asthma, CAD, and now extensive bilateral PE--RA sat above 90%  PE-on heparin at present--vasc cards involved--NO thrombectomy planned-xarelto planned  copd/asthma-symbicort 160, spiriva, singulair 10mg,  incentive spirometry/acapella  osas-out pt DD in progress  allergy-flonase 1 bid  cad--on mult meds  GI prophylaxis                        Abnormal CT--hypercoag w/up--CT a/p neg and LLE DVT confirmed  DC planning    Aris Shields MD-Pulmonary   [phone] as above: stable on IV heparin- vasc cards-xarelto to start  multifactorial dyspnea-underlying copd/asthma, CAD, and now extensive bilateral PE--RA sat above 90%  PE-on heparin at present--vasc cards involved--NO thrombectomy planned-xarelto planned  copd/asthma-symbicort 160, spiriva, singulair 10mg,  incentive spirometry/acapella  osas-out pt DD in progress  allergy-flonase 1 bid  cad--on mult meds  GI prophylaxis                        Abnormal CT--hypercoag w/up--CT a/p neg and LLE DVT confirmed; heme onc eval in progress-hypercoag state  DC planning    Aris Shields MD-Pulmonary   [phone]

## 2022-01-27 NOTE — PROGRESS NOTE ADULT - SUBJECTIVE AND OBJECTIVE BOX
Vascular Cardiology Progress Note    SERVICE CONSULT: [phone]              OFFICE [phone]    CC:  SOB    Interval Events:  Doing well  Remains on heparin gtt  walked to bathroom and back no issues.        Allergies  No Known Allergies   MEDICATIONS  (STANDING):  atorvastatin 40 milliGRAM(s) Oral at bedtime  budesonide  80 MICROgram(s)/formoterol 4.5 MICROgram(s) Inhaler 2 Puff(s) Inhalation two times a day  influenza  Vaccine (HIGH DOSE) 0.7 milliLiter(s) IntraMuscular once  montelukast 10 milliGRAM(s) Oral daily  rivaroxaban 15 milliGRAM(s) Oral two times a day  tamsulosin 0.4 milliGRAM(s) Oral daily  tiotropium 18 MICROgram(s) Capsule 1 Capsule(s) Inhalation daily    PAST MEDICAL & SURGICAL HISTORY:  HTN (hypertension)  HLD (hyperlipidemia)  Asthma  2019 novel coronavirus disease (COVID-19)  1/2021    FAMILY HISTORY:  No pertinent family history in first degree relatives    SOCIAL HISTORY:  unchanged    REVIEW OF SYSTEMS:  CONSTITUTIONAL: No fever  EYES: No eye pain, visual disturbances  ENT:  No sinus or throat pain  NECK: No pain or stiffness  RESPIRATORY: Prior SOB   CARDIOVASCULAR: No C/P  GASTROINTESTINAL: No abdominal pain  GENITOURINARY: No hematuria  NEUROLOGICAL: No headaches, memory loss, loss of strength, numbness, or tremors  SKIN: No rash  LYMPH Nodes: No enlarged glands noted  ENDOCRINE: No heat or cold intolerance noted  MUSCULOSKELETAL: No LE edema  PSYCHIATRIC: No depression, anxiety  HEME/LYMPH: No  bleeding gums  ALLERGY AND IMMUNOLOGIC: No hives    [ x] All others negative	   ICU Vital Signs Last 24 Hrs  T(C): 36.7 (27 Jan 2022 04:00), Max: 37.1 (26 Jan 2022 20:10)  T(F): 98.1 (27 Jan 2022 04:00), Max: 98.7 (26 Jan 2022 20:10)  HR: 71 (27 Jan 2022 04:00) (66 - 72)  BP: 156/90 (27 Jan 2022 04:00) (152/85 - 188/99)  BP(mean): --  ABP: --  ABP(mean): --  RR: 18 (27 Jan 2022 04:00) (18 - 18)  SpO2: 94% (27 Jan 2022 04:00) (93% - 98%)      Appearance: NAD 	  HEENT:  NC/AT  Cardiovascular: RRR, S1 and S2  Respiratory: CTA B/L  Psychiatry:  AAO x 3  Gastrointestinal:  Soft, Non-tender, + BS	  Skin: No rashes, No ecchymoses, No cyanosis	  Neurologic: no focal deficit  Extremities: no LE edema, bilateral calves soft                              12.8   4.80  )-----------( 237      ( 27 Jan 2022 07:02 )             39.3   01-27    144  |  110<H>  |  22  ----------------------------<  101<H>  3.5   |  21<L>  |  1.13    Ca    9.4      27 Jan 2022 06:59  Phos  3.4     01-27  Mg     1.9     01-27    TPro  6.2  /  Alb  3.6  /  TBili  0.4  /  DBili  x   /  AST  17  /  ALT  14  /  AlkPhos  100  01-26    Assessment:  1. Bilateral PE      +Positive biomarkers - down trending       +RV strain  2. HTN  3. HLD  4. History of COVID-19  5. Asthma  6. History of SIOBHAN  7. RILEY - Resolved    Plan:  1.  D/W Hematology fellow, will transition to Xarelto 15mg BID for 3 weeks, then 20mg daily thereafter  2.  For now would hold off on amlodipine/labetalol.   3.  Tomorrow, if he continues to show stability on xarelto, then would resume Amlodipine, but at a lower dose, 2.5mg daily   4.  As he recovers, we can resume Labetalol as outpatient  5.  Hopefully d/c planning 24-48 hours after starting Xarelto  6.  Will need outpatient followup with urology for his PSA    Thanks    Tonja [phone]

## 2022-01-27 NOTE — PROGRESS NOTE ADULT - SUBJECTIVE AND OBJECTIVE BOX
Patient is a 76y old  Male who presents with a chief complaint of SOB (27 Jan 2022 11:07)      DATE OF SERVICE: 01-27-22 @ 14:06    SUBJECTIVE / OVERNIGHT EVENTS: overnight events noted    ROS:  Resp: + cough no sputum production  CVS: No chest pain no palpitations no orthopnea  GI: no N/V/D  : no dysuria, no hematuria  Neuro: no weakness no paresthesias          MEDICATIONS  (STANDING):  atorvastatin 40 milliGRAM(s) Oral at bedtime  budesonide  80 MICROgram(s)/formoterol 4.5 MICROgram(s) Inhaler 2 Puff(s) Inhalation two times a day  influenza  Vaccine (HIGH DOSE) 0.7 milliLiter(s) IntraMuscular once  montelukast 10 milliGRAM(s) Oral daily  rivaroxaban 15 milliGRAM(s) Oral two times a day  tamsulosin 0.4 milliGRAM(s) Oral daily  tiotropium 18 MICROgram(s) Capsule 1 Capsule(s) Inhalation daily    MEDICATIONS  (PRN):  albuterol/ipratropium for Nebulization 3 milliLiter(s) Nebulizer every 6 hours PRN Bronchospasm  guaiFENesin Oral Liquid (Sugar-Free) 200 milliGRAM(s) Oral every 6 hours PRN Cough        CAPILLARY BLOOD GLUCOSE        I&O's Summary    26 Jan 2022 07:01  -  27 Jan 2022 07:00  --------------------------------------------------------  IN: 1014 mL / OUT: 900 mL / NET: 114 mL        Vital Signs Last 24 Hrs  T(C): 36.6 (27 Jan 2022 12:00), Max: 37.1 (26 Jan 2022 20:10)  T(F): 97.9 (27 Jan 2022 12:00), Max: 98.7 (26 Jan 2022 20:10)  HR: 75 (27 Jan 2022 12:00) (66 - 75)  BP: 153/99 (27 Jan 2022 12:00) (152/85 - 162/91)  BP(mean): --  RR: 18 (27 Jan 2022 12:00) (18 - 18)  SpO2: 95% (27 Jan 2022 12:00) (93% - 95%)    PHYSICAL EXAM:  GENERAL: in no distress  EYES: EOMI, PERRLA,  NECK: Supple, No JVD  CHEST/LUNG: clear   HEART: S1 S2; no murmurs   ABDOMEN: Soft, Nontender  EXTREMITIES:   no edema  NEUROLOGY: AO x 3 non-focal  SKIN: No rashes or lesions    LABS:                        12.8   4.80  )-----------( 237      ( 27 Jan 2022 07:02 )             39.3     01-27    144  |  110<H>  |  22  ----------------------------<  101<H>  3.5   |  21<L>  |  1.13    Ca    9.4      27 Jan 2022 06:59  Phos  3.4     01-27  Mg     1.9     01-27    TPro  6.2  /  Alb  3.6  /  TBili  0.4  /  DBili  x   /  AST  17  /  ALT  14  /  AlkPhos  100  01-26    PT/INR - ( 27 Jan 2022 07:02 )   PT: 13.7 sec;   INR: 1.15 ratio         PTT - ( 27 Jan 2022 07:02 )  PTT:70.7 sec            All consultant(s) notes reviewed and care discussed with other providers        Contact Number, Dr Crespo [phone]

## 2022-01-27 NOTE — CHART NOTE - NSCHARTNOTEFT_GEN_A_CORE
OK to start patient on Xarelto and discharge from hematologic perspective. Low suspicion for APLS. LAC negative. Will follow up the pending labs. Will assist with making appointment for follow up at [facility] with Dr. Carroll after discharge.     Elliott Babin MD  Hematology/Oncology Fellow   pager [phone]   After 5pm and on weekends page on call fellow

## 2022-01-28 ENCOUNTER — TRANSCRIPTION ENCOUNTER (OUTPATIENT)
Age: 77
End: 2022-01-28

## 2022-01-28 VITALS
HEART RATE: 61 BPM | SYSTOLIC BLOOD PRESSURE: 193 MMHG | OXYGEN SATURATION: 96 % | RESPIRATION RATE: 19 BRPM | DIASTOLIC BLOOD PRESSURE: 94 MMHG | TEMPERATURE: 98 F

## 2022-01-28 LAB — CARDIOLIPIN AB SER-ACNC: NEGATIVE — SIGNIFICANT CHANGE UP

## 2022-01-28 PROCEDURE — 85025 COMPLETE CBC W/AUTO DIFF WBC: CPT

## 2022-01-28 PROCEDURE — 71275 CT ANGIOGRAPHY CHEST: CPT

## 2022-01-28 PROCEDURE — 94640 AIRWAY INHALATION TREATMENT: CPT

## 2022-01-28 PROCEDURE — 82330 ASSAY OF CALCIUM: CPT

## 2022-01-28 PROCEDURE — 83735 ASSAY OF MAGNESIUM: CPT

## 2022-01-28 PROCEDURE — U0003: CPT

## 2022-01-28 PROCEDURE — 99232 SBSQ HOSP IP/OBS MODERATE 35: CPT

## 2022-01-28 PROCEDURE — 82435 ASSAY OF BLOOD CHLORIDE: CPT

## 2022-01-28 PROCEDURE — 86900 BLOOD TYPING SEROLOGIC ABO: CPT

## 2022-01-28 PROCEDURE — U0005: CPT

## 2022-01-28 PROCEDURE — 71045 X-RAY EXAM CHEST 1 VIEW: CPT

## 2022-01-28 PROCEDURE — 85018 HEMOGLOBIN: CPT

## 2022-01-28 PROCEDURE — 85730 THROMBOPLASTIN TIME PARTIAL: CPT

## 2022-01-28 PROCEDURE — 84132 ASSAY OF SERUM POTASSIUM: CPT

## 2022-01-28 PROCEDURE — G0103: CPT

## 2022-01-28 PROCEDURE — 83880 ASSAY OF NATRIURETIC PEPTIDE: CPT

## 2022-01-28 PROCEDURE — 93970 EXTREMITY STUDY: CPT

## 2022-01-28 PROCEDURE — 85610 PROTHROMBIN TIME: CPT

## 2022-01-28 PROCEDURE — 74177 CT ABD & PELVIS W/CONTRAST: CPT

## 2022-01-28 PROCEDURE — 36415 COLL VENOUS BLD VENIPUNCTURE: CPT

## 2022-01-28 PROCEDURE — 99285 EMERGENCY DEPT VISIT HI MDM: CPT | Mod: 25

## 2022-01-28 PROCEDURE — 93005 ELECTROCARDIOGRAM TRACING: CPT

## 2022-01-28 PROCEDURE — 93306 TTE W/DOPPLER COMPLETE: CPT

## 2022-01-28 PROCEDURE — 86850 RBC ANTIBODY SCREEN: CPT

## 2022-01-28 PROCEDURE — 85379 FIBRIN DEGRADATION QUANT: CPT

## 2022-01-28 PROCEDURE — 84484 ASSAY OF TROPONIN QUANT: CPT

## 2022-01-28 PROCEDURE — 83605 ASSAY OF LACTIC ACID: CPT

## 2022-01-28 PROCEDURE — 85027 COMPLETE CBC AUTOMATED: CPT

## 2022-01-28 PROCEDURE — 82803 BLOOD GASES ANY COMBINATION: CPT

## 2022-01-28 PROCEDURE — 84100 ASSAY OF PHOSPHORUS: CPT

## 2022-01-28 PROCEDURE — 99233 SBSQ HOSP IP/OBS HIGH 50: CPT

## 2022-01-28 PROCEDURE — 80048 BASIC METABOLIC PNL TOTAL CA: CPT

## 2022-01-28 PROCEDURE — 80053 COMPREHEN METABOLIC PANEL: CPT

## 2022-01-28 PROCEDURE — 85014 HEMATOCRIT: CPT

## 2022-01-28 PROCEDURE — 86901 BLOOD TYPING SEROLOGIC RH(D): CPT

## 2022-01-28 PROCEDURE — 82947 ASSAY GLUCOSE BLOOD QUANT: CPT

## 2022-01-28 PROCEDURE — 86147 CARDIOLIPIN ANTIBODY EA IG: CPT

## 2022-01-28 PROCEDURE — 84295 ASSAY OF SERUM SODIUM: CPT

## 2022-01-28 RX ORDER — HYDRALAZINE HCL 50 MG
25 TABLET ORAL ONCE
Refills: 0 | Status: DISCONTINUED | OUTPATIENT
Start: 2022-01-28 | End: 2022-01-28

## 2022-01-28 RX ORDER — FLUTICASONE FUROATE AND VILANTEROL TRIFENATATE 100; 25 UG/1; UG/1
100-25 POWDER RESPIRATORY (INHALATION) DAILY
Qty: 1 | Refills: 0 | Status: ACTIVE | COMMUNITY
Start: 2022-01-28 | End: 1900-01-01

## 2022-01-28 RX ORDER — ALBUTEROL SULFATE 90 UG/1
108 (90 BASE) AEROSOL, METERED RESPIRATORY (INHALATION) EVERY 6 HOURS
Qty: 1 | Refills: 0 | Status: ACTIVE | COMMUNITY
Start: 2022-01-28 | End: 1900-01-01

## 2022-01-28 RX ORDER — AMLODIPINE BESYLATE 2.5 MG/1
1 TABLET ORAL
Qty: 0 | Refills: 0 | DISCHARGE

## 2022-01-28 RX ORDER — AMLODIPINE BESYLATE 2.5 MG/1
1 TABLET ORAL
Qty: 30 | Refills: 0
Start: 2022-01-28 | End: 2022-02-26

## 2022-01-28 RX ORDER — LABETALOL HCL 100 MG
1 TABLET ORAL
Qty: 0 | Refills: 0 | DISCHARGE

## 2022-01-28 RX ORDER — AMLODIPINE BESYLATE 2.5 MG/1
5 TABLET ORAL ONCE
Refills: 0 | Status: COMPLETED | OUTPATIENT
Start: 2022-01-28 | End: 2022-01-28

## 2022-01-28 RX ORDER — TAMSULOSIN HYDROCHLORIDE 0.4 MG/1
0.4 CAPSULE ORAL
Qty: 7 | Refills: 0 | Status: ACTIVE | COMMUNITY
Start: 2022-01-28 | End: 1900-01-01

## 2022-01-28 RX ADMIN — BUDESONIDE AND FORMOTEROL FUMARATE DIHYDRATE 2 PUFF(S): 160; 4.5 AEROSOL RESPIRATORY (INHALATION) at 18:02

## 2022-01-28 RX ADMIN — BUDESONIDE AND FORMOTEROL FUMARATE DIHYDRATE 2 PUFF(S): 160; 4.5 AEROSOL RESPIRATORY (INHALATION) at 05:28

## 2022-01-28 RX ADMIN — Medication 200 MILLIGRAM(S): at 06:35

## 2022-01-28 RX ADMIN — TIOTROPIUM BROMIDE 1 CAPSULE(S): 18 CAPSULE ORAL; RESPIRATORY (INHALATION) at 11:32

## 2022-01-28 RX ADMIN — AMLODIPINE BESYLATE 5 MILLIGRAM(S): 2.5 TABLET ORAL at 16:26

## 2022-01-28 RX ADMIN — RIVAROXABAN 15 MILLIGRAM(S): KIT at 05:28

## 2022-01-28 RX ADMIN — RIVAROXABAN 15 MILLIGRAM(S): KIT at 18:02

## 2022-01-28 RX ADMIN — MONTELUKAST 10 MILLIGRAM(S): 4 TABLET, CHEWABLE ORAL at 11:32

## 2022-01-28 NOTE — PROGRESS NOTE ADULT - TIME BILLING
as above: stable on IV heparin- vasc cards-xarelto to start  multifactorial dyspnea-underlying copd/asthma, CAD, and now extensive bilateral PE--RA sat above 90%  PE-on heparin at present--vasc cards involved--NO thrombectomy planned-xarelto planned  copd/asthma-symbicort 160, spiriva, singulair 10mg,  incentive spirometry/acapella  osas-out pt DD in progress  allergy-flonase 1 bid  cad--on mult meds  GI prophylaxis                        Abnormal CT--hypercoag w/up--CT a/p neg and LLE DVT confirmed; heme onc eval in progress-hypercoag state  DC planning    Aris Shields MD-Pulmonary   [phone] as above: stable on xarelto now as per vasc cards  multifactorial dyspnea-underlying copd/asthma, CAD, and now extensive bilateral PE--RA sat above 90%  PE-off heparin-vasc cards involved--NO thrombectomy planned-xarelto 15 bid then 20mg on D22 going forward  copd/asthma-symbicort 160, spiriva, singulair 10mg,  incentive spirometry/acapella  osas-out pt DD in progress  allergy-flonase 1 bid  cad--on mult meds  GI prophylaxis                        Abnormal CT--hypercoag w/up--CT a/p neg and LLE DVT confirmed; heme onc eval in progress-hypercoag state  DC planning    Aris Shields MD-Pulmonary   [phone]

## 2022-01-28 NOTE — PROGRESS NOTE ADULT - PROBLEM SELECTOR PLAN 6
asymptomatic   continue tamsulosin

## 2022-01-28 NOTE — PROGRESS NOTE ADULT - PROBLEM SELECTOR PLAN 4
continue Lipitor 40 po qd

## 2022-01-28 NOTE — DISCHARGE NOTE NURSING/CASE MANAGEMENT/SOCIAL WORK - NSDCPEFALRISK_GEN_ALL_CORE
For information on Fall & Injury Prevention, visit: [email or web address] OR  [email or web address] OR  https://www.cdc.gov/steadi/patient.html

## 2022-01-28 NOTE — PROGRESS NOTE ADULT - SUBJECTIVE AND OBJECTIVE BOX
CHIEF COMPLAINT: f/up sob, asthma/copd, allergic rhinitis, osas, PE-    Interval Events:    REVIEW OF SYSTEMS:  Constitutional: No fevers or chills. No weight loss. No fatigue or generalized malaise.  Eyes: No itching or discharge from the eyes  ENT: No ear pain. No ear discharge. No nasal congestion. No post nasal drip. No epistaxis. No throat pain. No sore throat. No difficulty swallowing.   CV: No chest pain. No palpitations. No lightheadedness or dizziness.   Resp: No dyspnea at rest. No dyspnea on exertion. No orthopnea. No wheezing. No cough. No stridor. No sputum production. No chest pain with respiration.  GI: No nausea. No vomiting. No diarrhea.  MSK: No joint pain or pain in any extremities  Integumentary: No skin lesions. No pedal edema.  Neurological: No gross motor weakness. No sensory changes.  [ ] All other systems negative  [ ] Unable to assess ROS because ________    OBJECTIVE:  ICU Vital Signs Last 24 Hrs  T(C): 36.8 (28 Jan 2022 00:08), Max: 36.9 (27 Jan 2022 20:21)  T(F): 98.3 (28 Jan 2022 00:08), Max: 98.5 (27 Jan 2022 20:21)  HR: 62 (28 Jan 2022 00:08) (62 - 82)  BP: 154/83 (28 Jan 2022 00:08) (128/84 - 154/83)  BP(mean): --  ABP: --  ABP(mean): --  RR: 17 (28 Jan 2022 00:08) (17 - 18)  SpO2: 96% (28 Jan 2022 00:08) (93% - 96%)        01-26 @ 07:01  -  01-27 @ 07:00  --------------------------------------------------------  IN: 1014 mL / OUT: 900 mL / NET: 114 mL    01-27 @ 07:01 - 01-28 @ 05:18  --------------------------------------------------------  IN: 850 mL / OUT: 0 mL / NET: 850 mL      CAPILLARY BLOOD GLUCOSE          PHYSICAL EXAM:  General: Awake, alert, oriented X 3.   HEENT: Atraumatic, normocephalic.                 Mallampatti Grade                 No nasal congestion.                No tonsillar or pharyngeal exudates.  Lymph Nodes: No palpable lymphadenopathy  Neck: No JVD. No carotid bruit.   Respiratory: Normal chest expansion                         Normal percussion                         Normal and equal air entry                         No wheeze, rhonchi or rales.  Cardiovascular: S1 S2 normal. No murmurs, rubs or gallops.   Abdomen: Soft, non-tender, non-distended. No organomegaly. Normoactive bowel sounds.  Extremities: Warm to touch. Peripheral pulse palpable. No pedal edema.   Skin: No rashes or skin lesions  Neurological: Motor and sensory examination equal and normal in all four extremities.  Psychiatry: Appropriate mood and affect.    HOSPITAL MEDICATIONS:  MEDICATIONS  (STANDING):  atorvastatin 40 milliGRAM(s) Oral at bedtime  budesonide  80 MICROgram(s)/formoterol 4.5 MICROgram(s) Inhaler 2 Puff(s) Inhalation two times a day  influenza  Vaccine (HIGH DOSE) 0.7 milliLiter(s) IntraMuscular once  montelukast 10 milliGRAM(s) Oral daily  rivaroxaban 15 milliGRAM(s) Oral two times a day  tamsulosin 0.4 milliGRAM(s) Oral daily  tiotropium 18 MICROgram(s) Capsule 1 Capsule(s) Inhalation daily    MEDICATIONS  (PRN):  albuterol/ipratropium for Nebulization 3 milliLiter(s) Nebulizer every 6 hours PRN Bronchospasm  guaiFENesin Oral Liquid (Sugar-Free) 200 milliGRAM(s) Oral every 6 hours PRN Cough      LABS:                        12.8   4.80  )-----------( 237      ( 27 Jan 2022 07:02 )             39.3     01-27    144  |  110<H>  |  22  ----------------------------<  101<H>  3.5   |  21<L>  |  1.13    Ca    9.4      27 Jan 2022 06:59  Phos  3.4     01-27  Mg     1.9     01-27    TPro  6.2  /  Alb  3.6  /  TBili  0.4  /  DBili  x   /  AST  17  /  ALT  14  /  AlkPhos  100  01-26    PT/INR - ( 27 Jan 2022 07:02 )   PT: 13.7 sec;   INR: 1.15 ratio         PTT - ( 27 Jan 2022 07:02 )  PTT:70.7 sec          MICROBIOLOGY:     RADIOLOGY:  [ ] Reviewed and interpreted by me    Point of Care Ultrasound Findings:    PFT:    EKG: CHIEF COMPLAINT: f/up sob, asthma/copd, allergic rhinitis, osas, PE-good over all---no resp complaints    Interval Events: oral xarelto    REVIEW OF SYSTEMS:  Constitutional: No fevers or chills. No weight loss. No fatigue or generalized malaise.  Eyes: No itching or discharge from the eyes  ENT: No ear pain. No ear discharge. No nasal congestion. No post nasal drip. No epistaxis. No throat pain. No sore throat. No difficulty swallowing.   CV: No chest pain. No palpitations. No lightheadedness or dizziness.   Resp: No dyspnea at rest. No dyspnea on exertion. No orthopnea. No wheezing. No cough. No stridor. No sputum production. No chest pain with respiration.  GI: No nausea. No vomiting. No diarrhea.  MSK: No joint pain or pain in any extremities  Integumentary: No skin lesions. No pedal edema.  Neurological: No gross motor weakness. No sensory changes.  [ +] All other systems negative  [ ] Unable to assess ROS because ________    OBJECTIVE:  ICU Vital Signs Last 24 Hrs  T(C): 36.8 (28 Jan 2022 00:08), Max: 36.9 (27 Jan 2022 20:21)  T(F): 98.3 (28 Jan 2022 00:08), Max: 98.5 (27 Jan 2022 20:21)  HR: 62 (28 Jan 2022 00:08) (62 - 82)  BP: 154/83 (28 Jan 2022 00:08) (128/84 - 154/83)  BP(mean): --  ABP: --  ABP(mean): --  RR: 17 (28 Jan 2022 00:08) (17 - 18)  SpO2: 96% (28 Jan 2022 00:08) (93% - 96%)        01-26 @ 07:01  -  01-27 @ 07:00  --------------------------------------------------------  IN: 1014 mL / OUT: 900 mL / NET: 114 mL    01-27 @ 07:01  -  01-28 @ 05:18  --------------------------------------------------------  IN: 850 mL / OUT: 0 mL / NET: 850 mL      CAPILLARY BLOOD GLUCOSE          PHYSICAL EXAM: NAD in bed on RA  General: Awake, alert, oriented X 3.   HEENT: Atraumatic, normocephalic.                 Mallampatti Grade 2                No nasal congestion.                No tonsillar or pharyngeal exudates.  Lymph Nodes: No palpable lymphadenopathy  Neck: No JVD. No carotid bruit.   Respiratory: Normal chest expansion                         Normal percussion                         Normal and equal air entry                         No wheeze, rhonchi or rales.  Cardiovascular: S1 S2 normal. No murmurs, rubs or gallops.   Abdomen: Soft, non-tender, non-distended. No organomegaly. Normoactive bowel sounds.  Extremities: Warm to touch. Peripheral pulse palpable. No pedal edema.   Skin: No rashes or skin lesions  Neurological: Motor and sensory examination equal and normal in all four extremities.  Psychiatry: Appropriate mood and affect.    HOSPITAL MEDICATIONS:  MEDICATIONS  (STANDING):  atorvastatin 40 milliGRAM(s) Oral at bedtime  budesonide  80 MICROgram(s)/formoterol 4.5 MICROgram(s) Inhaler 2 Puff(s) Inhalation two times a day  influenza  Vaccine (HIGH DOSE) 0.7 milliLiter(s) IntraMuscular once  montelukast 10 milliGRAM(s) Oral daily  rivaroxaban 15 milliGRAM(s) Oral two times a day  tamsulosin 0.4 milliGRAM(s) Oral daily  tiotropium 18 MICROgram(s) Capsule 1 Capsule(s) Inhalation daily    MEDICATIONS  (PRN):  albuterol/ipratropium for Nebulization 3 milliLiter(s) Nebulizer every 6 hours PRN Bronchospasm  guaiFENesin Oral Liquid (Sugar-Free) 200 milliGRAM(s) Oral every 6 hours PRN Cough      LABS:                        12.8   4.80  )-----------( 237      ( 27 Jan 2022 07:02 )             39.3     01-27    144  |  110<H>  |  22  ----------------------------<  101<H>  3.5   |  21<L>  |  1.13    Ca    9.4      27 Jan 2022 06:59  Phos  3.4     01-27  Mg     1.9     01-27    TPro  6.2  /  Alb  3.6  /  TBili  0.4  /  DBili  x   /  AST  17  /  ALT  14  /  AlkPhos  100  01-26    PT/INR - ( 27 Jan 2022 07:02 )   PT: 13.7 sec;   INR: 1.15 ratio         PTT - ( 27 Jan 2022 07:02 )  PTT:70.7 sec          MICROBIOLOGY:     RADIOLOGY:  [ ] Reviewed and interpreted by me    Point of Care Ultrasound Findings:    PFT:    EKG:

## 2022-01-28 NOTE — PROGRESS NOTE ADULT - PROBLEM SELECTOR PROBLEM 1
Pulmonary thromboembolism

## 2022-01-28 NOTE — DISCHARGE NOTE PROVIDER - CARE PROVIDERS DIRECT ADDRESSES
,[email or web address] Medical [email or web address] ,[email or web address] ,[email or web address]

## 2022-01-28 NOTE — DISCHARGE NOTE PROVIDER - NSDCFUSCHEDAPPT_GEN_ALL_CORE_FT
CONNIE OROZCO ; 03/16/2022 ; [facility] [address] [facility]  CONNIE OROZCO ; 03/16/2022 ; [facility] [address] [facility]

## 2022-01-28 NOTE — DISCHARGE NOTE NURSING/CASE MANAGEMENT/SOCIAL WORK - PATIENT PORTAL LINK FT
You can access the FollowMyHealth Patient Portal offered by [facility] by registering at the following website: [email or web address]. By joining [facility]’s FollowMyHealth portal, you will also be able to view your health information using other applications (apps) compatible with our system.

## 2022-01-28 NOTE — PROGRESS NOTE ADULT - ASSESSMENT
75 yo M with hx of asthma, HTN, HLD, SIOBHAN presented with 1 week of MARTINEZ and found to have elevated d-dimer, trop, proBNP concerning for PE. TTE showed Lugo's sign, however, pt has ?RILEY and now has confirmatory CTPE-bilateral extensive.  NO evidence for copd/asthma exacerbation.  ***************************                   SEE Below:  1/26-no additional intervention planned--xarelto planned as per vasc cards  1/27-Left DVT confirmed but CT a/p normal-on heparin; heme evaln in progress   77 yo M with hx of asthma, HTN, HLD, SIOBHAN presented with 1 week of MARTINEZ and found to have elevated d-dimer, trop, proBNP concerning for PE. TTE showed Lugo's sign, however, pt has ?RILEY and now has confirmatory CTPE-bilateral extensive.  NO evidence for copd/asthma exacerbation.  ***************************                   SEE Below:  1/26-no additional intervention planned--xarelto planned as per vasc cards  1/27-Left DVT confirmed but CT a/p normal-on heparin; heme evaln in progress  1/28-feeling well-on oral A/C-ambulating

## 2022-01-28 NOTE — DISCHARGE NOTE PROVIDER - NSDCMRMEDTOKEN_GEN_ALL_CORE_FT
amLODIPine 5 mg oral tablet: 1 tab(s) orally once a day   atorvastatin 40 mg oral tablet: 1 tab(s) orally once a day  Breo Ellipta 100 mcg-25 mcg/inh inhalation powder: 1 puff(s) inhaled once a day  montelukast 10 mg oral tablet: 1 tab(s) orally once a day  tamsulosin 0.4 mg oral capsule: 1 cap(s) orally once a day  Ventolin HFA 90 mcg/inh inhalation aerosol: 2 puff(s) inhaled every 6 hours  Xarelto Starter Pack 15 mg-20 mg oral kit: 1 tab(s) orally twice a day for 21 days and then 20mg daily

## 2022-01-28 NOTE — DISCHARGE NOTE PROVIDER - PROVIDER TOKENS
PROVIDER:[TOKEN:[[id number]],FOLLOWUP:[Routine],ESTABLISHEDPATIENT:[T]],PROVIDER:[TOKEN:[[id number]],SCHEDULEDAPPT:[03/16/2022],SCHEDULEDAPPTTIME:[01:15 PM]] PROVIDER:[TOKEN:[[id number]],FOLLOWUP:[Routine],ESTABLISHEDPATIENT:[T]],PROVIDER:[TOKEN:[[id number]],SCHEDULEDAPPT:[03/16/2022],SCHEDULEDAPPTTIME:[01:15 PM]],PROVIDER:[TOKEN:[[id number]],FOLLOWUP:[Routine],ESTABLISHEDPATIENT:[T]] PROVIDER:[TOKEN:[[id number]],FOLLOWUP:[Routine],ESTABLISHEDPATIENT:[T]],PROVIDER:[TOKEN:[[id number]],SCHEDULEDAPPT:[03/16/2022],SCHEDULEDAPPTTIME:[01:15 PM]],PROVIDER:[TOKEN:[[id number]],FOLLOWUP:[Routine],ESTABLISHEDPATIENT:[T]],PROVIDER:[TOKEN:[[id number]]]

## 2022-01-28 NOTE — PROGRESS NOTE ADULT - PROBLEM SELECTOR PLAN 5
no wheeze audible  albuterol PRN  continue Montelukast

## 2022-01-28 NOTE — PROGRESS NOTE ADULT - PROBLEM SELECTOR PLAN 3
will continue to monitor  not on any meds  start amlodipine 5 mg po qd will continue to monitor  BP running high 180 - 200 SBP range  discussed with cardiology attending  received 5 mg amlodipine   OK for discharge per Dr Evangelista cardiology attending  outpatient titration of BP meds  avoid sudden lowering of BP in view of massive bilateral PE

## 2022-01-28 NOTE — PROGRESS NOTE ADULT - PROBLEM SELECTOR PROBLEM 2
RILEY (acute kidney injury)

## 2022-01-28 NOTE — PROGRESS NOTE ADULT - NSPROGADDITIONALINFOA_GEN_ALL_CORE
< from: CT Angio Chest PE Protocol w/ IV Cont (01.25.22 @ 01:08) >      PULMONARY ANGIOGRAM: Extensive bilateral pulmonary emboli including   lobar, segmental and subsegmental branches involving all 5 lobes.    LYMPH NODES: No mediastinal and/or hilar lymphadenopathy.    HEART/VASCULATURE: The heart size is normal. No pericardial effusion.   Right ventricular enlargement with flattening of the interventricular   septum.  Coronary artery calcifications. Trace reflux of contrast within   the IVC.    AIRWAYS/LUNGS/PLEURA: Central airways are patent. Minimal bilateral lower   lobe dependent partial atelectasis, left greater than right. No pleural   effusions or pneumothorax.    UPPER ABDOMEN: Left renal cortical cysts. A 1.1 cm left adrenal   myelolipoma.    BONES/SOFT TISSUES: Degenerative changes of the bones. The soft tissues   are unremarkable.    IMPRESSION:    Extensive bilateral lobar, segmental and subsegmental pulmonary emboli   involving all 5 lobes with evidence of right heart strain.    Dr. Pritchard discussed these findings with DEVANTE Leone on 1/25/2022 1:29   AM, with read back.      < end of copied text >
discussed with patient in detail, expresses understanding of treatment plans.  encounter 45 min
discharge home with outpatient follow up vascular cardiology and urology
discussed with patient in detail, expresses understanding of treatment plans.  discussed with cardiology  discussed with covering ACP

## 2022-01-28 NOTE — PROGRESS NOTE ADULT - SUBJECTIVE AND OBJECTIVE BOX
Patient is a 76y old  Male who presents with a chief complaint of SOB (28 Jan 2022 11:53)      DATE OF SERVICE: 01-28-22 @ 14:47    SUBJECTIVE / OVERNIGHT EVENTS: overnight events noted    ROS:  Resp: No cough no sputum production  CVS: No chest pain no palpitations no orthopnea  GI: no N/V/D  : no dysuria, no hematuria        MEDICATIONS  (STANDING):  atorvastatin 40 milliGRAM(s) Oral at bedtime  budesonide  80 MICROgram(s)/formoterol 4.5 MICROgram(s) Inhaler 2 Puff(s) Inhalation two times a day  influenza  Vaccine (HIGH DOSE) 0.7 milliLiter(s) IntraMuscular once  montelukast 10 milliGRAM(s) Oral daily  rivaroxaban 15 milliGRAM(s) Oral two times a day  tamsulosin 0.4 milliGRAM(s) Oral daily  tiotropium 18 MICROgram(s) Capsule 1 Capsule(s) Inhalation daily    MEDICATIONS  (PRN):  albuterol/ipratropium for Nebulization 3 milliLiter(s) Nebulizer every 6 hours PRN Bronchospasm  guaiFENesin Oral Liquid (Sugar-Free) 200 milliGRAM(s) Oral every 6 hours PRN Cough        CAPILLARY BLOOD GLUCOSE        I&O's Summary    27 Jan 2022 07:01  -  28 Jan 2022 07:00  --------------------------------------------------------  IN: 850 mL / OUT: 0 mL / NET: 850 mL    28 Jan 2022 07:01  -  28 Jan 2022 14:47  --------------------------------------------------------  IN: 400 mL / OUT: 0 mL / NET: 400 mL        Vital Signs Last 24 Hrs  T(C): 37.2 (28 Jan 2022 11:00), Max: 37.2 (28 Jan 2022 11:00)  T(F): 98.9 (28 Jan 2022 11:00), Max: 98.9 (28 Jan 2022 11:00)  HR: 77 (28 Jan 2022 11:00) (62 - 82)  BP: 151/92 (28 Jan 2022 11:00) (128/84 - 176/94)  BP(mean): --  RR: 18 (28 Jan 2022 11:00) (17 - 18)  SpO2: 94% (28 Jan 2022 11:00) (93% - 96%)    PHYSICAL EXAM:  GENERAL: in no distress  EYES: EOMI, PERRLA,  NECK: Supple, No JVD  CHEST/LUNG: clear   HEART: S1 S2; no murmurs   ABDOMEN: Soft, Nontender  EXTREMITIES:   no edema  NEUROLOGY: AO x 3 non-focal  SKIN: No rashes or lesions    LABS:                        12.8   4.80  )-----------( 237      ( 27 Jan 2022 07:02 )             39.3     01-27    144  |  110<H>  |  22  ----------------------------<  101<H>  3.5   |  21<L>  |  1.13    Ca    9.4      27 Jan 2022 06:59  Phos  3.4     01-27  Mg     1.9     01-27      PT/INR - ( 27 Jan 2022 07:02 )   PT: 13.7 sec;   INR: 1.15 ratio         PTT - ( 27 Jan 2022 07:02 )  PTT:70.7 sec            All consultant(s) notes reviewed and care discussed with other providers        Contact Number, Dr Crespo [phone]

## 2022-01-28 NOTE — DISCHARGE NOTE NURSING/CASE MANAGEMENT/SOCIAL WORK - FLU SEASON?
Yes... Cyclophosphamide Pregnancy And Lactation Text: This medication is Pregnancy Category D and it isn't considered safe during pregnancy. This medication is excreted in breast milk. Fluconazole Pregnancy And Lactation Text: This medication is Pregnancy Category C and it isn't know if it is safe during pregnancy. It is also excreted in breast milk. Spironolactone Pregnancy And Lactation Text: This medication can cause feminization of the male fetus and should be avoided during pregnancy. The active metabolite is also found in breast milk. Picato Pregnancy And Lactation Text: This medication is Pregnancy Category C. It is unknown if this medication is excreted in breast milk. Enbrel Pregnancy And Lactation Text: This medication is Pregnancy Category B and is considered safe during pregnancy. It is unknown if this medication is excreted in breast milk. Azithromycin Counseling:  I discussed with the patient the risks of azithromycin including but not limited to GI upset, allergic reaction, drug rash, diarrhea, and yeast infections. Hydroxychloroquine Counseling:  I discussed with the patient that a baseline ophthalmologic exam is needed at the start of therapy and every year thereafter while on therapy. A CBC may also be warranted for monitoring.  The side effects of this medication were discussed with the patient, including but not limited to agranulocytosis, aplastic anemia, seizures, rashes, retinopathy, and liver toxicity. Patient instructed to call the office should any adverse effect occur.  The patient verbalized understanding of the proper use and possible adverse effects of Plaquenil.  All the patient's questions and concerns were addressed. SSKI Counseling:  I discussed with the patient the risks of SSKI including but not limited to thyroid abnormalities, metallic taste, GI upset, fever, headache, acne, arthralgias, paraesthesias, lymphadenopathy, easy bleeding, arrhythmias, and allergic reaction. Cyclosporine Counseling:  I discussed with the patient the risks of cyclosporine including but not limited to hypertension, gingival hyperplasia,myelosuppression, immunosuppression, liver damage, kidney damage, neurotoxicity, lymphoma, and serious infections. The patient understands that monitoring is required including baseline blood pressure, CBC, CMP, lipid panel and uric acid, and then 1-2 times monthly CMP and blood pressure. Griseofulvin Counseling:  I discussed with the patient the risks of griseofulvin including but not limited to photosensitivity, cytopenia, liver damage, nausea/vomiting and severe allergy.  The patient understands that this medication is best absorbed when taken with a fatty meal (e.g., ice cream or french fries). Benzoyl Peroxide Counseling: Patient counseled that medicine may cause skin irritation and bleach clothing.  In the event of skin irritation, the patient was advised to reduce the amount of the drug applied or use it less frequently.   The patient verbalized understanding of the proper use and possible adverse effects of benzoyl peroxide.  All of the patient's questions and concerns were addressed. Include Pregnancy/Lactation Warning?: No Humira Counseling:  I discussed with the patient the risks of adalimumab including but not limited to myelosuppression, immunosuppression, autoimmune hepatitis, demyelinating diseases, lymphoma, and serious infections.  The patient understands that monitoring is required including a PPD at baseline and must alert us or the primary physician if symptoms of infection or other concerning signs are noted. Benzoyl Peroxide Pregnancy And Lactation Text: This medication is Pregnancy Category C. It is unknown if benzoyl peroxide is excreted in breast milk. Azithromycin Pregnancy And Lactation Text: This medication is considered safe during pregnancy and is also secreted in breast milk. Arava Counseling:  Patient counseled regarding adverse effects of Arava including but not limited to nausea, vomiting, abnormalities in liver function tests. Patients may develop mouth sores, rash, diarrhea, and abnormalities in blood counts. The patient understands that monitoring is required including LFTs and blood counts.  There is a rare possibility of scarring of the liver and lung problems that can occur when taking methotrexate. Persistent nausea, loss of appetite, pale stools, dark urine, cough, and shortness of breath should be reported immediately. Patient advised to discontinue Arava treatment and consult with a physician prior to attempting conception. The patient will have to undergo a treatment to eliminate Arava from the body prior to conception. Protopic Counseling: Patient may experience a mild burning sensation during topical application. Protopic is not approved in children less than 2 years of age. There have been case reports of hematologic and skin malignancies in patients using topical calcineurin inhibitors although causality is questionable. Metronidazole Pregnancy And Lactation Text: This medication is Pregnancy Category B and considered safe during pregnancy.  It is also excreted in breast milk. Sski Pregnancy And Lactation Text: This medication is Pregnancy Category D and isn't considered safe during pregnancy. It is excreted in breast milk. Griseofulvin Pregnancy And Lactation Text: This medication is Pregnancy Category X and is known to cause serious birth defects. It is unknown if this medication is excreted in breast milk but breast feeding should be avoided. Cyclosporine Pregnancy And Lactation Text: This medication is Pregnancy Category C and it isn't know if it is safe during pregnancy. This medication is excreted in breast milk. Taltz Counseling: I discussed with the patient the risks of ixekizumab including but not limited to immunosuppression, serious infections, worsening of inflammatory bowel disease and drug reactions.  The patient understands that monitoring is required including a PPD at baseline and must alert us or the primary physician if symptoms of infection or other concerning signs are noted. 5-Fu Pregnancy And Lactation Text: This medication is Pregnancy Category X and contraindicated in pregnancy and in women who may become pregnant. It is unknown if this medication is excreted in breast milk. Albendazole Counseling:  I discussed with the patient the risks of albendazole including but not limited to cytopenia, kidney damage, nausea/vomiting and severe allergy.  The patient understands that this medication is being used in an off-label manner. Protopic Pregnancy And Lactation Text: This medication is Pregnancy Category C. It is unknown if this medication is excreted in breast milk when applied topically. Minocycline Counseling: Patient advised regarding possible photosensitivity and discoloration of the teeth, skin, lips, tongue and gums.  Patient instructed to avoid sunlight, if possible.  When exposed to sunlight, patients should wear protective clothing, sunglasses, and sunscreen.  The patient was instructed to call the office immediately if the following severe adverse effects occur:  hearing changes, easy bruising/bleeding, severe headache, or vision changes.  The patient verbalized understanding of the proper use and possible adverse effects of minocycline.  All of the patient's questions and concerns were addressed. Arava Pregnancy And Lactation Text: This medication is Pregnancy Category X and is absolutely contraindicated during pregnancy. It is unknown if it is excreted in breast milk. Thalidomide Counseling: I discussed with the patient the risks of thalidomide including but not limited to birth defects, anxiety, weakness, chest pain, dizziness, cough and severe allergy. Methotrexate Counseling:  Patient counseled regarding adverse effects of methotrexate including but not limited to nausea, vomiting, abnormalities in liver function tests. Patients may develop mouth sores, rash, diarrhea, and abnormalities in blood counts. The patient understands that monitoring is required including LFT's and blood counts.  There is a rare possibility of scarring of the liver and lung problems that can occur when taking methotrexate. Persistent nausea, loss of appetite, pale stools, dark urine, cough, and shortness of breath should be reported immediately. Patient advised to discontinue methotrexate treatment at least three months before attempting to become pregnant.  I discussed the need for folate supplements while taking methotrexate.  These supplements can decrease side effects during methotrexate treatment. The patient verbalized understanding of the proper use and possible adverse effects of methotrexate.  All of the patient's questions and concerns were addressed. Carac Counseling:  I discussed with the patient the risks of Carac including but not limited to erythema, scaling, itching, weeping, crusting, and pain. Albendazole Pregnancy And Lactation Text: This medication is Pregnancy Category C and it isn't known if it is safe during pregnancy. It is also excreted in breast milk. Taltz Pregnancy And Lactation Text: The risk during pregnancy and breastfeeding is uncertain with this medication. Hydroxychloroquine Pregnancy And Lactation Text: This medication has been shown to cause fetal harm but it isn't assigned a Pregnancy Risk Category. There are small amounts excreted in breast milk. Bactrim Counseling:  I discussed with the patient the risks of sulfa antibiotics including but not limited to GI upset, allergic reaction, drug rash, diarrhea, dizziness, photosensitivity, and yeast infections.  Rarely, more serious reactions can occur including but not limited to aplastic anemia, agranulocytosis, methemoglobinemia, blood dyscrasias, liver or kidney failure, lung infiltrates or desquamative/blistering drug rashes. Drysol Counseling:  I discussed with the patient the risks of drysol/aluminum chloride including but not limited to skin rash, itching, irritation, burning. Nsaids Counseling: NSAID Counseling: I discussed with the patient that NSAIDs should be taken with food. Prolonged use of NSAIDs can result in the development of stomach ulcers.  Patient advised to stop taking NSAIDs if abdominal pain occurs.  The patient verbalized understanding of the proper use and possible adverse effects of NSAIDs.  All of the patient's questions and concerns were addressed. Clofazimine Counseling:  I discussed with the patient the risks of clofazimine including but not limited to skin and eye pigmentation, liver damage, nausea/vomiting, gastrointestinal bleeding and allergy. Solaraze Counseling:  I discussed with the patient the risks of Solaraze including but not limited to erythema, scaling, itching, weeping, crusting, and pain. Itraconazole Counseling:  I discussed with the patient the risks of itraconazole including but not limited to liver damage, nausea/vomiting, neuropathy, and severe allergy.  The patient understands that this medication is best absorbed when taken with acidic beverages such as non-diet cola or ginger ale.  The patient understands that monitoring is required including baseline LFTs and repeat LFTs at intervals.  The patient understands that they are to contact us or the primary physician if concerning signs are noted. Methotrexate Pregnancy And Lactation Text: This medication is Pregnancy Category X and is known to cause fetal harm. This medication is excreted in breast milk. Bactrim Pregnancy And Lactation Text: This medication is Pregnancy Category D and is known to cause fetal risk.  It is also excreted in breast milk. Drysol Pregnancy And Lactation Text: This medication is considered safe during pregnancy and breast feeding. Ivermectin Counseling:  Patient instructed to take medication on an empty stomach with a full glass of water.  Patient informed of potential adverse effects including but not limited to nausea, diarrhea, dizziness, itching, and swelling of the extremities or lymph nodes.  The patient verbalized understanding of the proper use and possible adverse effects of ivermectin.  All of the patient's questions and concerns were addressed. Ilumya Counseling: I discussed with the patient the risks of tildrakizumab including but not limited to immunosuppression, malignancy, posterior leukoencephalopathy syndrome, and serious infections.  The patient understands that monitoring is required including a PPD at baseline and must alert us or the primary physician if symptoms of infection or other concerning signs are noted. Tremfya Counseling: I discussed with the patient the risks of guselkumab including but not limited to immunosuppression, serious infections, worsening of inflammatory bowel disease and drug reactions.  The patient understands that monitoring is required including a PPD at baseline and must alert us or the primary physician if symptoms of infection or other concerning signs are noted. Minocycline Pregnancy And Lactation Text: This medication is Pregnancy Category D and not consider safe during pregnancy. It is also excreted in breast milk. Clofazimine Pregnancy And Lactation Text: This medication is Pregnancy Category C and isn't considered safe during pregnancy. It is excreted in breast milk. Solaraze Pregnancy And Lactation Text: This medication is Pregnancy Category B and is considered safe. There is some data to suggest avoiding during the third trimester. It is unknown if this medication is excreted in breast milk. Prednisone Counseling:  I discussed with the patient the risks of prolonged use of prednisone including but not limited to weight gain, insomnia, osteoporosis, mood changes, diabetes, susceptibility to infection, glaucoma and high blood pressure.  In cases where prednisone use is prolonged, patients should be monitored with blood pressure checks, serum glucose levels and an eye exam.  Additionally, the patient may need to be placed on GI prophylaxis, PCP prophylaxis, and calcium and vitamin D supplementation and/or a bisphosphonate.  The patient verbalized understanding of the proper use and the possible adverse effects of prednisone.  All of the patient's questions and concerns were addressed. 5-Fu Counseling: 5-Fluorouracil Counseling:  I discussed with the patient the risks of 5-fluorouracil including but not limited to erythema, scaling, itching, weeping, crusting, and pain. Quinolones Counseling:  I discussed with the patient the risks of fluoroquinolones including but not limited to GI upset, allergic reaction, drug rash, diarrhea, dizziness, photosensitivity, yeast infections, liver function test abnormalities, tendonitis/tendon rupture. Valtrex Counseling: I discussed with the patient the risks of valacyclovir including but not limited to kidney damage, nausea, vomiting and severe allergy.  The patient understands that if the infection seems to be worsening or is not improving, they are to call. Elidel Counseling: Patient may experience a mild burning sensation during topical application. Elidel is not approved in children less than 2 years of age. There have been case reports of hematologic and skin malignancies in patients using topical calcineurin inhibitors although causality is questionable. Nsaids Pregnancy And Lactation Text: These medications are considered safe up to 30 weeks gestation. It is excreted in breast milk. Cephalexin Counseling: I counseled the patient regarding use of cephalexin as an antibiotic for prophylactic and/or therapeutic purposes. Cephalexin (commonly prescribed under brand name Keflex) is a cephalosporin antibiotic which is active against numerous classes of bacteria, including most skin bacteria. Side effects may include nausea, diarrhea, gastrointestinal upset, rash, hives, yeast infections, and in rare cases, hepatitis, kidney disease, seizures, fever, confusion, neurologic symptoms, and others. Patients with severe allergies to penicillin medications are cautioned that there is about a 10% incidence of cross-reactivity with cephalosporins. When possible, patients with penicillin allergies should use alternatives to cephalosporins for antibiotic therapy. Infliximab Counseling:  I discussed with the patient the risks of infliximab including but not limited to myelosuppression, immunosuppression, autoimmune hepatitis, demyelinating diseases, lymphoma, and serious infections.  The patient understands that monitoring is required including a PPD at baseline and must alert us or the primary physician if symptoms of infection or other concerning signs are noted. Colchicine Counseling:  Patient counseled regarding adverse effects including but not limited to stomach upset (nausea, vomiting, stomach pain, or diarrhea).  Patient instructed to limit alcohol consumption while taking this medication.  Colchicine may reduce blood counts especially with prolonged use.  The patient understands that monitoring of kidney function and blood counts may be required, especially at baseline. The patient verbalized understanding of the proper use and possible adverse effects of colchicine.  All of the patient's questions and concerns were addressed. Ketoconazole Counseling:   Patient counseled regarding improving absorption with orange juice.  Adverse effects include but are not limited to breast enlargement, headache, diarrhea, nausea, upset stomach, liver function test abnormalities, taste disturbance, and stomach pain.  There is a rare possibility of liver failure that can occur when taking ketoconazole. The patient understands that monitoring of LFTs may be required, especially at baseline. The patient verbalized understanding of the proper use and possible adverse effects of ketoconazole.  All of the patient's questions and concerns were addressed. Topical Retinoid counseling:  Patient advised to apply a pea-sized amount only at bedtime and wait 30 minutes after washing their face before applying.  If too drying, patient may add a non-comedogenic moisturizer. The patient verbalized understanding of the proper use and possible adverse effects of retinoids.  All of the patient's questions and concerns were addressed. Valtrex Pregnancy And Lactation Text: this medication is Pregnancy Category B and is considered safe during pregnancy. This medication is not directly found in breast milk but it's metabolite acyclovir is present. Xeljanz Counseling: I discussed with the patient the risks of Xeljanz therapy including increased risk of infection, liver issues, headache, diarrhea, or cold symptoms. Live vaccines should be avoided. They were instructed to call if they have any problems. Cephalexin Pregnancy And Lactation Text: This medication is Pregnancy Category B and considered safe during pregnancy.  It is also excreted in breast milk but can be used safely for shorter doses. Odomzo Counseling- I discussed with the patient the risks of Odomzo including but not limited to nausea, vomiting, diarrhea, constipation, weight loss, changes in the sense of taste, decreased appetite, muscle spasms, and hair loss.  The patient verbalized understanding of the proper use and possible adverse effects of Odomzo.  All of the patient's questions and concerns were addressed. Ketoconazole Pregnancy And Lactation Text: This medication is Pregnancy Category C and it isn't know if it is safe during pregnancy. It is also excreted in breast milk and breast feeding isn't recommended. Xelanselmoz Pregnancy And Lactation Text: This medication is Pregnancy Category D and is not considered safe during pregnancy.  The risk during breast feeding is also uncertain. Rifampin Counseling: I discussed with the patient the risks of rifampin including but not limited to liver damage, kidney damage, red-orange body fluids, nausea/vomiting and severe allergy. Eucrisa Counseling: Patient may experience a mild burning sensation during topical application. Eucrisa is not approved in children less than 2 years of age. Terbinafine Counseling: Patient counseling regarding adverse effects of terbinafine including but not limited to headache, diarrhea, rash, upset stomach, liver function test abnormalities, itching, taste/smell disturbance, nausea, abdominal pain, and flatulence.  There is a rare possibility of liver failure that can occur when taking terbinafine.  The patient understands that a baseline LFT and kidney function test may be required. The patient verbalized understanding of the proper use and possible adverse effects of terbinafine.  All of the patient's questions and concerns were addressed. Rituxan Counseling:  I discussed with the patient the risks of Rituxan infusions. Side effects can include infusion reactions, severe drug rashes including mucocutaneous reactions, reactivation of latent hepatitis and other infections and rarely progressive multifocal leukoencephalopathy.  All of the patient's questions and concerns were addressed. Clindamycin Counseling: I counseled the patient regarding use of clindamycin as an antibiotic for prophylactic and/or therapeutic purposes. Clindamycin is active against numerous classes of bacteria, including skin bacteria. Side effects may include nausea, diarrhea, gastrointestinal upset, rash, hives, yeast infections, and in rare cases, colitis. Dapsone Counseling: I discussed with the patient the risks of dapsone including but not limited to hemolytic anemia, agranulocytosis, rashes, methemoglobinemia, kidney failure, peripheral neuropathy, headaches, GI upset, and liver toxicity.  Patients who start dapsone require monitoring including baseline LFTs and weekly CBCs for the first month, then every month thereafter.  The patient verbalized understanding of the proper use and possible adverse effects of dapsone.  All of the patient's questions and concerns were addressed. Tazorac Counseling:  Patient advised that medication is irritating and drying.  Patient may need to apply sparingly and wash off after an hour before eventually leaving it on overnight.  The patient verbalized understanding of the proper use and possible adverse effects of tazorac.  All of the patient's questions and concerns were addressed. Xolair Counseling:  Patient informed of potential adverse effects including but not limited to fever, muscle aches, rash and allergic reactions.  The patient verbalized understanding of the proper use and possible adverse effects of Xolair.  All of the patient's questions and concerns were addressed. Acitretin Counseling:  I discussed with the patient the risks of acitretin including but not limited to hair loss, dry lips/skin/eyes, liver damage, hyperlipidemia, depression/suicidal ideation, photosensitivity.  Serious rare side effects can include but are not limited to pancreatitis, pseudotumor cerebri, bony changes, clot formation/stroke/heart attack.  Patient understands that alcohol is contraindicated since it can result in liver toxicity and significantly prolong the elimination of the drug by many years. Clindamycin Pregnancy And Lactation Text: This medication can be used in pregnancy if certain situations. Clindamycin is also present in breast milk. Rituxan Pregnancy And Lactation Text: This medication is Pregnancy Category C and it isn't know if it is safe during pregnancy. It is unknown if this medication is excreted in breast milk but similar antibodies are known to be excreted. Terbinafine Pregnancy And Lactation Text: This medication is Pregnancy Category B and is considered safe during pregnancy. It is also excreted in breast milk and breast feeding isn't recommended. Dapsone Pregnancy And Lactation Text: This medication is Pregnancy Category C and is not considered safe during pregnancy or breast feeding. Eucrisa Pregnancy And Lactation Text: This medication has not been assigned a Pregnancy Risk Category but animal studies failed to show danger with the topical medication. It is unknown if the medication is excreted in breast milk. Tazorac Pregnancy And Lactation Text: This medication is not safe during pregnancy. It is unknown if this medication is excreted in breast milk. Rifampin Pregnancy And Lactation Text: This medication is Pregnancy Category C and it isn't know if it is safe during pregnancy. It is also excreted in breast milk and should not be used if you are breast feeding. Otezla Counseling: The side effects of Otezla were discussed with the patient, including but not limited to worsening or new depression, weight loss, diarrhea, nausea, upper respiratory tract infection, and headache. Patient instructed to call the office should any adverse effect occur.  The patient verbalized understanding of the proper use and possible adverse effects of Otezla.  All the patient's questions and concerns were addressed. Xolair Pregnancy And Lactation Text: This medication is Pregnancy Category B and is considered safe during pregnancy. This medication is excreted in breast milk. Acitretin Pregnancy And Lactation Text: This medication is Pregnancy Category X and should not be given to women who are pregnant or may become pregnant in the future. This medication is excreted in breast milk. Cimzia Counseling:  I discussed with the patient the risks of Cimzia including but not limited to immunosuppression, allergic reactions and infections.  The patient understands that monitoring is required including a PPD at baseline and must alert us or the primary physician if symptoms of infection or other concerning signs are noted. Siliq Counseling:  I discussed with the patient the risks of Siliq including but not limited to new or worsening depression, suicidal thoughts and behavior, immunosuppression, malignancy, posterior leukoencephalopathy syndrome, and serious infections.  The patient understands that monitoring is required including a PPD at baseline and must alert us or the primary physician if symptoms of infection or other concerning signs are noted. There is also a special program designed to monitor depression which is required with Siliq. Sarecycline Counseling: Patient advised regarding possible photosensitivity and discoloration of the teeth, skin, lips, tongue and gums.  Patient instructed to avoid sunlight, if possible.  When exposed to sunlight, patients should wear protective clothing, sunglasses, and sunscreen.  The patient was instructed to call the office immediately if the following severe adverse effects occur:  hearing changes, easy bruising/bleeding, severe headache, or vision changes.  The patient verbalized understanding of the proper use and possible adverse effects of sarecycline.  All of the patient's questions and concerns were addressed. Hydroquinone Counseling:  Patient advised that medication may result in skin irritation, lightening (hypopigmentation), dryness, and burning.  In the event of skin irritation, the patient was advised to reduce the amount of the drug applied or use it less frequently.  Rarely, spots that are treated with hydroquinone can become darker (pseudoochronosis).  Should this occur, patient instructed to stop medication and call the office. The patient verbalized understanding of the proper use and possible adverse effects of hydroquinone.  All of the patient's questions and concerns were addressed. Bexarotene Counseling:  I discussed with the patient the risks of bexarotene including but not limited to hair loss, dry lips/skin/eyes, liver abnormalities, hyperlipidemia, pancreatitis, depression/suicidal ideation, photosensitivity, drug rash/allergic reactions, hypothyroidism, anemia, leukopenia, infection, cataracts, and teratogenicity.  Patient understands that they will need regular blood tests to check lipid profile, liver function tests, white blood cell count, thyroid function tests and pregnancy test if applicable. Doxycycline Counseling:  Patient counseled regarding possible photosensitivity and increased risk for sunburn.  Patient instructed to avoid sunlight, if possible.  When exposed to sunlight, patients should wear protective clothing, sunglasses, and sunscreen.  The patient was instructed to call the office immediately if the following severe adverse effects occur:  hearing changes, easy bruising/bleeding, severe headache, or vision changes.  The patient verbalized understanding of the proper use and possible adverse effects of doxycycline.  All of the patient's questions and concerns were addressed. Erivedge Counseling- I discussed with the patient the risks of Erivedge including but not limited to nausea, vomiting, diarrhea, constipation, weight loss, changes in the sense of taste, decreased appetite, muscle spasms, and hair loss.  The patient verbalized understanding of the proper use and possible adverse effects of Erivedge.  All of the patient's questions and concerns were addressed. Otezla Pregnancy And Lactation Text: This medication is Pregnancy Category C and it isn't known if it is safe during pregnancy. It is unknown if it is excreted in breast milk. Topical Clindamycin Counseling: Patient counseled that this medication may cause skin irritation or allergic reactions.  In the event of skin irritation, the patient was advised to reduce the amount of the drug applied or use it less frequently.   The patient verbalized understanding of the proper use and possible adverse effects of clindamycin.  All of the patient's questions and concerns were addressed. Topical Clindamycin Pregnancy And Lactation Text: This medication is Pregnancy Category B and is considered safe during pregnancy. It is unknown if it is excreted in breast milk. Oxybutynin Counseling:  I discussed with the patient the risks of oxybutynin including but not limited to skin rash, drowsiness, dry mouth, difficulty urinating, and blurred vision. Azathioprine Counseling:  I discussed with the patient the risks of azathioprine including but not limited to myelosuppression, immunosuppression, hepatotoxicity, lymphoma, and infections.  The patient understands that monitoring is required including baseline LFTs, Creatinine, possible TPMP genotyping and weekly CBCs for the first month and then every 2 weeks thereafter.  The patient verbalized understanding of the proper use and possible adverse effects of azathioprine.  All of the patient's questions and concerns were addressed. Doxycycline Pregnancy And Lactation Text: This medication is Pregnancy Category D and not consider safe during pregnancy. It is also excreted in breast milk but is considered safe for shorter treatment courses. Bexarotene Pregnancy And Lactation Text: This medication is Pregnancy Category X and should not be given to women who are pregnant or may become pregnant. This medication should not be used if you are breast feeding. Cimzia Pregnancy And Lactation Text: This medication crosses the placenta but can be considered safe in certain situations. Cimzia may be excreted in breast milk. Cimetidine Counseling:  I discussed with the patient the risks of Cimetidine including but not limited to gynecomastia, headache, diarrhea, nausea, drowsiness, arrhythmias, pancreatitis, skin rashes, psychosis, bone marrow suppression and kidney toxicity. Tetracycline Counseling: Patient counseled regarding possible photosensitivity and increased risk for sunburn.  Patient instructed to avoid sunlight, if possible.  When exposed to sunlight, patients should wear protective clothing, sunglasses, and sunscreen.  The patient was instructed to call the office immediately if the following severe adverse effects occur:  hearing changes, easy bruising/bleeding, severe headache, or vision changes.  The patient verbalized understanding of the proper use and possible adverse effects of tetracycline.  All of the patient's questions and concerns were addressed. Patient understands to avoid pregnancy while on therapy due to potential birth defects. Simponi Counseling:  I discussed with the patient the risks of golimumab including but not limited to myelosuppression, immunosuppression, autoimmune hepatitis, demyelinating diseases, lymphoma, and serious infections.  The patient understands that monitoring is required including a PPD at baseline and must alert us or the primary physician if symptoms of infection or other concerning signs are noted. Cosentyx Counseling:  I discussed with the patient the risks of Cosentyx including but not limited to worsening of Crohn's disease, immunosuppression, allergic reactions and infections.  The patient understands that monitoring is required including a PPD at baseline and must alert us or the primary physician if symptoms of infection or other concerning signs are noted. Imiquimod Counseling:  I discussed with the patient the risks of imiquimod including but not limited to erythema, scaling, itching, weeping, crusting, and pain.  Patient understands that the inflammatory response to imiquimod is variable from person to person and was educated regarded proper titration schedule.  If flu-like symptoms develop, patient knows to discontinue the medication and contact us. Azathioprine Pregnancy And Lactation Text: This medication is Pregnancy Category D and isn't considered safe during pregnancy. It is unknown if this medication is excreted in breast milk. Isotretinoin Counseling: Patient should get monthly blood tests, not donate blood, not drive at night if vision affected, not share medication, and not undergo elective surgery for 6 months after tx completed. Side effects reviewed, pt to contact office should one occur. Gabapentin Counseling: I discussed with the patient the risks of gabapentin including but not limited to dizziness, somnolence, fatigue and ataxia. Topical Sulfur Applications Counseling: Topical Sulfur Counseling: Patient counseled that this medication may cause skin irritation or allergic reactions.  In the event of skin irritation, the patient was advised to reduce the amount of the drug applied or use it less frequently.   The patient verbalized understanding of the proper use and possible adverse effects of topical sulfur application.  All of the patient's questions and concerns were addressed. Detail Level: Detailed Erythromycin Counseling:  I discussed with the patient the risks of erythromycin including but not limited to GI upset, allergic reaction, drug rash, diarrhea, increase in liver enzymes, and yeast infections. Erythromycin Pregnancy And Lactation Text: This medication is Pregnancy Category B and is considered safe during pregnancy. It is also excreted in breast milk. Cellcept Counseling:  I discussed with the patient the risks of mycophenolate mofetil including but not limited to infection/immunosuppression, GI upset, hypokalemia, hypercholesterolemia, bone marrow suppression, lymphoproliferative disorders, malignancy, GI ulceration/bleed/perforation, colitis, interstitial lung disease, kidney failure, progressive multifocal leukoencephalopathy, and birth defects.  The patient understands that monitoring is required including a baseline creatinine and regular CBC testing. In addition, patient must alert us immediately if symptoms of infection or other concerning signs are noted. Birth Control Pills Counseling: Birth Control Pill Counseling: I discussed with the patient the potential side effects of OCPs including but not limited to increased risk of stroke, heart attack, thrombophlebitis, deep venous thrombosis, hepatic adenomas, breast changes, GI upset, headaches, and depression.  The patient verbalized understanding of the proper use and possible adverse effects of OCPs. All of the patient's questions and concerns were addressed. Topical Sulfur Applications Pregnancy And Lactation Text: This medication is Pregnancy Category C and has an unknown safety profile during pregnancy. It is unknown if this topical medication is excreted in breast milk. Isotretinoin Pregnancy And Lactation Text: This medication is Pregnancy Category X and is considered extremely dangerous during pregnancy. It is unknown if it is excreted in breast milk. Doxepin Counseling:  Patient advised that the medication is sedating and not to drive a car after taking this medication. Patient informed of potential adverse effects including but not limited to dry mouth, urinary retention, and blurry vision.  The patient verbalized understanding of the proper use and possible adverse effects of doxepin.  All of the patient's questions and concerns were addressed. Dupixent Counseling: I discussed with the patient the risks of dupilumab including but not limited to eye infection and irritation, cold sores, injection site reactions, worsening of asthma, allergic reactions and increased risk of parasitic infection.  Live vaccines should be avoided while taking dupilumab. Dupilumab will also interact with certain medications such as warfarin and cyclosporine. The patient understands that monitoring is required and they must alert us or the primary physician if symptoms of infection or other concerning signs are noted. Doxepin Pregnancy And Lactation Text: This medication is Pregnancy Category C and it isn't known if it is safe during pregnancy. It is also excreted in breast milk and breast feeding isn't recommended. Minoxidil Counseling: Minoxidil is a topical medication which can increase blood flow where it is applied. It is uncertain how this medication increases hair growth. Side effects are uncommon and include stinging and allergic reactions. Zyclara Counseling:  I discussed with the patient the risks of imiquimod including but not limited to erythema, scaling, itching, weeping, crusting, and pain.  Patient understands that the inflammatory response to imiquimod is variable from person to person and was educated regarded proper titration schedule.  If flu-like symptoms develop, patient knows to discontinue the medication and contact us. Skyrizi Counseling: I discussed with the patient the risks of risankizumab-rzaa including but not limited to immunosuppression, and serious infections.  The patient understands that monitoring is required including a PPD at baseline and must alert us or the primary physician if symptoms of infection or other concerning signs are noted. High Dose Vitamin A Counseling: Side effects reviewed, pt to contact office should one occur. Birth Control Pills Pregnancy And Lactation Text: This medication should be avoided if pregnant and for the first 30 days post-partum. Glycopyrrolate Counseling:  I discussed with the patient the risks of glycopyrrolate including but not limited to skin rash, drowsiness, dry mouth, difficulty urinating, and blurred vision. Metronidazole Counseling:  I discussed with the patient the risks of metronidazole including but not limited to seizures, nausea/vomiting, a metallic taste in the mouth, nausea/vomiting and severe allergy. Glycopyrrolate Pregnancy And Lactation Text: This medication is Pregnancy Category B and is considered safe during pregnancy. It is unknown if it is excreted breast milk. Spironolactone Counseling: Patient advised regarding risks of diarrhea, abdominal pain, hyperkalemia, birth defects (for female patients), liver toxicity and renal toxicity. The patient may need blood work to monitor liver and kidney function and potassium levels while on therapy. The patient verbalized understanding of the proper use and possible adverse effects of spironolactone.  All of the patient's questions and concerns were addressed. Hydroxyzine Counseling: Patient advised that the medication is sedating and not to drive a car after taking this medication.  Patient informed of potential adverse effects including but not limited to dry mouth, urinary retention, and blurry vision.  The patient verbalized understanding of the proper use and possible adverse effects of hydroxyzine.  All of the patient's questions and concerns were addressed. Fluconazole Counseling:  Patient counseled regarding adverse effects of fluconazole including but not limited to headache, diarrhea, nausea, upset stomach, liver function test abnormalities, taste disturbance, and stomach pain.  There is a rare possibility of liver failure that can occur when taking fluconazole.  The patient understands that monitoring of LFTs and kidney function test may be required, especially at baseline. The patient verbalized understanding of the proper use and possible adverse effects of fluconazole.  All of the patient's questions and concerns were addressed. Dupixent Pregnancy And Lactation Text: This medication likely crosses the placenta but the risk for the fetus is uncertain. This medication is excreted in breast milk. Cyclophosphamide Counseling:  I discussed with the patient the risks of cyclophosphamide including but not limited to hair loss, hormonal abnormalities, decreased fertility, abdominal pain, diarrhea, nausea and vomiting, bone marrow suppression and infection. The patient understands that monitoring is required while taking this medication. High Dose Vitamin A Pregnancy And Lactation Text: High dose vitamin A therapy is contraindicated during pregnancy and breast feeding. Stelara Counseling:  I discussed with the patient the risks of ustekinumab including but not limited to immunosuppression, malignancy, posterior leukoencephalopathy syndrome, and serious infections.  The patient understands that monitoring is required including a PPD at baseline and must alert us or the primary physician if symptoms of infection or other concerning signs are noted. Hydroxyzine Pregnancy And Lactation Text: This medication is not safe during pregnancy and should not be taken. It is also excreted in breast milk and breast feeding isn't recommended. Enbrel Counseling:  I discussed with the patient the risks of etanercept including but not limited to myelosuppression, immunosuppression, autoimmune hepatitis, demyelinating diseases, lymphoma, and infections.  The patient understands that monitoring is required including a PPD at baseline and must alert us or the primary physician if symptoms of infection or other concerning signs are noted. Picato Counseling:  I discussed with the patient the risks of Picato including but not limited to erythema, scaling, itching, weeping, crusting, and pain.

## 2022-01-28 NOTE — DISCHARGE NOTE PROVIDER - NPI NUMBER (FOR SYS[employer] USE ONLY) :
[[id number]] [[id number]],[[id number]] [[id number]],[[id number]]

## 2022-01-28 NOTE — DISCHARGE NOTE PROVIDER - HOSPITAL COURSE
75 y/o M with h/o asthma, HTN, HLD, presenting with 1 week of worsening MARTINEZ, found to have elevated troponin BNP D-dimer with ekg changes c/f submassive PE. CTPA 1/25 demonstrating b/l extensive PE w/ RV Strain. TTE 1/25 normal EF but w/ RVE decr RVSF, interventricular flattening RA/RV diastolic collapse. RILEY present on admission now resolved and likely due to dehydration. Hemodynamically stable on room air.  Heme, pulm and vascular cardiology evaluation appreciated. Recommended to start Xarelto and to follow up at the [facility] and pulm after hospital discharge.

## 2022-01-28 NOTE — PROGRESS NOTE ADULT - SUBJECTIVE AND OBJECTIVE BOX
Vascular Cardiology Progress Note    SERVICE CONSULT: [phone]              OFFICE [phone]    CC:  SOB    Interval Events:  Doing well  On xarelto   Feels well  BP elevated, however did not get amlodipine this morning       MEDICATIONS  (STANDING):  atorvastatin 40 milliGRAM(s) Oral at bedtime  budesonide  80 MICROgram(s)/formoterol 4.5 MICROgram(s) Inhaler 2 Puff(s) Inhalation two times a day  influenza  Vaccine (HIGH DOSE) 0.7 milliLiter(s) IntraMuscular once  montelukast 10 milliGRAM(s) Oral daily  rivaroxaban 15 milliGRAM(s) Oral two times a day  tamsulosin 0.4 milliGRAM(s) Oral daily  tiotropium 18 MICROgram(s) Capsule 1 Capsule(s) Inhalation daily    PAST MEDICAL & SURGICAL HISTORY:  HTN (hypertension)  HLD (hyperlipidemia)  Asthma  2019 novel coronavirus disease (COVID-19)  1/2021    FAMILY HISTORY:  No pertinent family history in first degree relatives    SOCIAL HISTORY:  unchanged    REVIEW OF SYSTEMS:  CONSTITUTIONAL: No fever  EYES: No eye pain, visual disturbances  ENT:  No sinus or throat pain  NECK: No pain or stiffness  RESPIRATORY: Prior SOB   CARDIOVASCULAR: No C/P  GASTROINTESTINAL: No abdominal pain  GENITOURINARY: No hematuria  NEUROLOGICAL: No headaches, memory loss, loss of strength, numbness, or tremors  SKIN: No rash  LYMPH Nodes: No enlarged glands noted  ENDOCRINE: No heat or cold intolerance noted  MUSCULOSKELETAL: No LE edema  PSYCHIATRIC: No depression, anxiety  HEME/LYMPH: No  bleeding gums  ALLERGY AND IMMUNOLOGIC: No hives    [ x] All others negative	   ICU Vital Signs Last 24 Hrs  T(C): 36.9 (28 Jan 2022 17:31), Max: 37.2 (28 Jan 2022 11:00)  T(F): 98.4 (28 Jan 2022 17:31), Max: 98.9 (28 Jan 2022 11:00)  HR: 64 (28 Jan 2022 17:31) (62 - 81)  BP: 170/92 (28 Jan 2022 17:31) (128/84 - 202/96)  BP(mean): --  ABP: --  ABP(mean): --  RR: 19 (28 Jan 2022 17:31) (17 - 19)  SpO2: 96% (28 Jan 2022 17:31) (94% - 98%)      Appearance: NAD 	  HEENT:  NC/AT  Cardiovascular: RRR, S1 and S2  Respiratory: CTA B/L  Psychiatry:  AAO x 3  Gastrointestinal:  Soft, Non-tender, + BS	  Skin: No rashes, No ecchymoses, No cyanosis	  Neurologic: no focal deficit  Extremities: no LE edema, bilateral calves soft                            12.8   4.80  )-----------( 237      ( 27 Jan 2022 07:02 )             39.3       Assessment:  1. Bilateral PE      +Positive biomarkers - down trending       +RV strain  2. HTN  3. HLD  4. History of COVID-19  5. Asthma  6. History of SIOBHAN  7. RILEY - Resolved    Plan:  1.   Continue with xarelto 15mg BID  2.  Resume Amlodipine 5mg today  3.  He will watch his BP readings at home, if remains high despite amlodipine 5, will increase to 10mg daily   4.  Hold off on labetalol at this moment  5.  Office visit next week    Tonja [phone]

## 2022-01-28 NOTE — DISCHARGE NOTE PROVIDER - NSDCCPCAREPLAN_GEN_ALL_CORE_FT
PRINCIPAL DISCHARGE DIAGNOSIS  Diagnosis: Pulmonary embolism  Assessment and Plan of Treatment: Continue on Xarelto 15mg twice a day for 21 days and then switch to 20mg daily.  Follow up at [facility] with Dr. Carroll after discharge.      SECONDARY DISCHARGE DIAGNOSES  Diagnosis: Acute kidney injury superimposed on CKD  Assessment and Plan of Treatment: Resolved.  Likely secondary to dehydration on admission.      Diagnosis: Hypertension  Assessment and Plan of Treatment: Start amlodipine 5mg daily.  Eat a heart healthy diet with low salt diet; exercise regularly (consult with your physician or cardiologist first); maintain a heart healthy weight; if you are on medication to help you quit smoking, be sure to take it as prescribed. Find healthy ways to deal with stress, such as exercise (check with your healthcare provider first), deep breathing, meditation, or enjoyable healthy hobbies. Continue to follow with your primary care physician or cardiologist.      Diagnosis: Hyperlipidemia  Assessment and Plan of Treatment: Continue Lipitor 40mg daily.  Continue with your cholesterol medications. Eat a heart healthy diet that is low in saturated fats and salt, and includes whole grains, fruits, vegetables and lean protein; exercise regularly (consult with your physician or cardiologist first); maintain a heart healthy weight; if you If you are on medication to help you quit smoking, be sure to take it as prescribed. Find healthy ways to deal with stress, such as exercise (check with your healthcare provider first), deep breathing, meditation, or enjoyable healthy hobbies. Continue to follow with your primary physician or cardiologist.      Diagnosis: Chronic obstructive asthma without status asthmaticus  Assessment and Plan of Treatment: Continue asthma medications as prescribed.    Diagnosis: BPH (benign prostatic hyperplasia)  Assessment and Plan of Treatment: Continue tamsulosin daily.  You have an enlarged prostate gland which gets bigger as men get older - it is a very common problem and has nothing to do with prostate cancer.  Call your doctor if you are urinating more frequently, have trouble starting to urinate, have weak stream, urine leaking or dribbling, and feeling as though bladder is not empty after urination.  Your doctor will monitor your prostate with a rectal exam as well as urine or blood testing.  You can help yourself by reducing the amount of fluid you drink before going to bed, limiting the amount of alcohol & caffeine you drink.  Avoid cold & allergy medication that contain decongestants or antihistamines which make BPH symptoms worse.  You can also "double void" by waiting a moment after urinating & trying again  Take your medication as prescribed - one .medication helps to relax the muscle around the urethra and the other medication you may take prevents the prostate from growing more or even shrinking the prostate.       PRINCIPAL DISCHARGE DIAGNOSIS  Diagnosis: Pulmonary embolism  Assessment and Plan of Treatment: Continue on Xarelto 15mg twice a day for 21 days and then switch to 20mg daily.  Follow up at [facility] with Dr. Carroll after discharge.      SECONDARY DISCHARGE DIAGNOSES  Diagnosis: Acute kidney injury superimposed on CKD  Assessment and Plan of Treatment: Resolved.  Likely secondary to dehydration on admission.      Diagnosis: Hypertension  Assessment and Plan of Treatment: Start amlodipine 5mg daily.  Eat a heart healthy diet with low salt diet; exercise regularly (consult with your physician or cardiologist first); maintain a heart healthy weight; if you are on medication to help you quit smoking, be sure to take it as prescribed. Find healthy ways to deal with stress, such as exercise (check with your healthcare provider first), deep breathing, meditation, or enjoyable healthy hobbies. Continue to follow with your primary care physician or cardiologist.      Diagnosis: Hyperlipidemia  Assessment and Plan of Treatment: Continue Lipitor 40mg daily.  Continue with your cholesterol medications. Eat a heart healthy diet that is low in saturated fats and salt, and includes whole grains, fruits, vegetables and lean protein; exercise regularly (consult with your physician or cardiologist first); maintain a heart healthy weight; if you If you are on medication to help you quit smoking, be sure to take it as prescribed. Find healthy ways to deal with stress, such as exercise (check with your healthcare provider first), deep breathing, meditation, or enjoyable healthy hobbies. Continue to follow with your primary physician or cardiologist.      Diagnosis: Chronic obstructive asthma without status asthmaticus  Assessment and Plan of Treatment: Continue asthma medications as prescribed.    Diagnosis: BPH (benign prostatic hyperplasia)  Assessment and Plan of Treatment: Continue tamsulosin daily.  You have an enlarged prostate gland which gets bigger as men get older - it is a very common problem and has nothing to do with prostate cancer.  Call your doctor if you are urinating more frequently, have trouble starting to urinate, have weak stream, urine leaking or dribbling, and feeling as though bladder is not empty after urination.  Your doctor will monitor your prostate with a rectal exam as well as urine or blood testing.  You can help yourself by reducing the amount of fluid you drink before going to bed, limiting the amount of alcohol & caffeine you drink.  Avoid cold & allergy medication that contain decongestants or antihistamines which make BPH symptoms worse.  You can also "double void" by waiting a moment after urinating & trying again  Take your medication as prescribed - one .medication helps to relax the muscle around the urethra and the other medication you may take prevents the prostate from growing more or even shrinking the prostate.  Follow up with your urologist Dr Zamora after hospital discharge.    Diagnosis: BPH with elevated PSA  Assessment and Plan of Treatment:   Follow up with your urologist Dr Zamora after hospital discharge.

## 2022-01-28 NOTE — PROGRESS NOTE ADULT - PROBLEM SELECTOR PLAN 1
massive bilateral PE on CTA  will continue to monitor  hemodynamically stable   transferred out of CCU  no surgical intervention required at this time per cardiology and PERT team  heme evaluation called by me  continue IV heparin till after heme clear for apixaban/xarelto  load
tolerating rivaroxaban  cleared for discharge home   discussed with vascular cardiology   patient admitted with acute cor pulmonale secondary to extensive bilateral pulmonary embolism  outpatient GI work up likely after 6 months anticoagulation  PSA 9 outpatient follow up with urology'  d/w son in law Dr Molina (surgical oncology) who states patient follows with Dr Zamora urology who is aware of elevated PSA  hypercoagulable work up negative
hemodynamically stable   heme evaluation appreciated   work up so far negative  cleared for rivaroxaban  received 1st dose this AM  bilateral venous duplex positive for left leg DVT  continue rivaroxaban   likely discharge home tomorrow  patient admitted with acute coronary angiogram pulmonale secondary to extensive bilateral pulmonary embolism  CT abd negative for any overt malignancy  outpatient GI work up likely after 6 months  anticoagulation
hemodynamically stable   no surgical intervention required at this time per cardiology and PERT team  heme evaluation appreciated   continue IV heparin till cleared by heme for NOAC  likely rivaroxaban 15 BID x 3 weeks then 20 po qd

## 2022-01-28 NOTE — PROGRESS NOTE ADULT - PROVIDER SPECIALTY LIST ADULT
Pulmonology
Vascular Cardiology
ETHAN
Vascular Cardiology
Vascular Cardiology
Pulmonology
Pulmonology
Internal Medicine

## 2022-01-28 NOTE — DISCHARGE NOTE PROVIDER - CARE PROVIDER_API CALL
Paolo Carroll)  Hematology; HospicePalliative Medicine; Internal Medicine; Medical Oncology  [address] [place] [address]  Phone: [phone]  Fax: [phone]  Established Patient  Follow Up Time: Routine    Aris Shields)  Internal Medicine; Pulmonary Disease  [address], [address]  [place] [address]  Phone: [phone]  Fax: [phone]  Scheduled Appointment: 03/16/2022 01:15 PM   Paolo Carroll)  Hematology; HospicePalliative Medicine; Internal Medicine; Medical Oncology  [address] [place] [address]  Phone: [phone]  Fax: [phone]  Established Patient  Follow Up Time: Routine    Aris Shields)  Internal Medicine; Pulmonary Disease  [address]  [place] [address]  Phone: [phone]  Fax: [phone]  Scheduled Appointment: 03/16/2022 01:15 PM    Nevaeh Zamora)  Urology  [address], [address]  [place] [address]  Phone: [phone]  Fax: [phone]  Established Patient  Follow Up Time: Routine   Paolo Carroll (MD)  Hematology; HospicePalliative Medicine; Internal Medicine; Medical Oncology  [address] [place] [address]  Phone: [phone]  Fax: [phone]  Established Patient  Follow Up Time: Routine    Aris Shields)  Internal Medicine; Pulmonary Disease  [address]  [place] [address]  Phone: [phone]  Fax: [phone]  Scheduled Appointment: 03/16/2022 01:15 PM    Nevaeh Zamora)  Urology  [address]  [place] [address]  Phone: [phone]  Fax: [phone]  Established Patient  Follow Up Time: Routine    Tre Evangelista (DO)  Cardiovascular Disease; Internal Medicine; Nuclear Cardiology  [address] [place] [address]  Phone: [phone]  Fax: [phone]  Follow Up Time:

## 2022-02-02 PROBLEM — U07.1 COVID-19: Chronic | Status: ACTIVE | Noted: 2022-01-24

## 2022-02-02 PROBLEM — J45.909 UNSPECIFIED ASTHMA, UNCOMPLICATED: Chronic | Status: ACTIVE | Noted: 2022-01-24

## 2022-02-02 PROBLEM — I10 ESSENTIAL (PRIMARY) HYPERTENSION: Chronic | Status: ACTIVE | Noted: 2022-01-24

## 2022-02-02 PROBLEM — E78.5 HYPERLIPIDEMIA, UNSPECIFIED: Chronic | Status: ACTIVE | Noted: 2022-01-24

## 2022-02-07 ENCOUNTER — OUTPATIENT (OUTPATIENT)
Dept: OUTPATIENT SERVICES | Facility: HOSPITAL | Age: 77
LOS: 1 days | Discharge: HOME | End: 2022-02-07

## 2022-02-07 DIAGNOSIS — I26.99 OTHER PULMONARY EMBOLISM WITHOUT ACUTE COR PULMONALE: ICD-10-CM

## 2022-02-08 ENCOUNTER — RESULT REVIEW (OUTPATIENT)
Age: 77
End: 2022-02-08

## 2022-02-08 ENCOUNTER — APPOINTMENT (OUTPATIENT)
Dept: HEMATOLOGY ONCOLOGY | Facility: CLINIC | Age: 77
End: 2022-02-08
Payer: MEDICARE

## 2022-02-08 VITALS
TEMPERATURE: 97 F | WEIGHT: 187.39 LBS | BODY MASS INDEX: 28.4 KG/M2 | DIASTOLIC BLOOD PRESSURE: 77 MMHG | OXYGEN SATURATION: 99 % | RESPIRATION RATE: 16 BRPM | HEIGHT: 67.99 IN | HEART RATE: 56 BPM | SYSTOLIC BLOOD PRESSURE: 129 MMHG

## 2022-02-08 LAB
BASOPHILS # BLD AUTO: 0.04 K/UL — SIGNIFICANT CHANGE UP (ref 0–0.2)
BASOPHILS NFR BLD AUTO: 0.7 % — SIGNIFICANT CHANGE UP (ref 0–2)
EOSINOPHIL # BLD AUTO: 0.25 K/UL — SIGNIFICANT CHANGE UP (ref 0–0.5)
EOSINOPHIL NFR BLD AUTO: 4.3 % — SIGNIFICANT CHANGE UP (ref 0–6)
HCT VFR BLD CALC: 43.7 % — SIGNIFICANT CHANGE UP (ref 39–50)
HGB BLD-MCNC: 14.1 G/DL — SIGNIFICANT CHANGE UP (ref 13–17)
IMM GRANULOCYTES NFR BLD AUTO: 0.3 % — SIGNIFICANT CHANGE UP (ref 0–1.5)
LYMPHOCYTES # BLD AUTO: 1.25 K/UL — SIGNIFICANT CHANGE UP (ref 1–3.3)
LYMPHOCYTES # BLD AUTO: 21.3 % — SIGNIFICANT CHANGE UP (ref 13–44)
MCHC RBC-ENTMCNC: 30.7 PG — SIGNIFICANT CHANGE UP (ref 27–34)
MCHC RBC-ENTMCNC: 32.3 G/DL — SIGNIFICANT CHANGE UP (ref 32–36)
MCV RBC AUTO: 95 FL — SIGNIFICANT CHANGE UP (ref 80–100)
MONOCYTES # BLD AUTO: 0.51 K/UL — SIGNIFICANT CHANGE UP (ref 0–0.9)
MONOCYTES NFR BLD AUTO: 8.7 % — SIGNIFICANT CHANGE UP (ref 2–14)
NEUTROPHILS # BLD AUTO: 3.79 K/UL — SIGNIFICANT CHANGE UP (ref 1.8–7.4)
NEUTROPHILS NFR BLD AUTO: 64.7 % — SIGNIFICANT CHANGE UP (ref 43–77)
NRBC # BLD: 0 /100 WBCS — SIGNIFICANT CHANGE UP (ref 0–0)
PLATELET # BLD AUTO: 254 K/UL — SIGNIFICANT CHANGE UP (ref 150–400)
RBC # BLD: 4.6 M/UL — SIGNIFICANT CHANGE UP (ref 4.2–5.8)
RBC # FLD: 12.7 % — SIGNIFICANT CHANGE UP (ref 10.3–14.5)
WBC # BLD: 5.86 K/UL — SIGNIFICANT CHANGE UP (ref 3.8–10.5)
WBC # FLD AUTO: 5.86 K/UL — SIGNIFICANT CHANGE UP (ref 3.8–10.5)

## 2022-02-08 PROCEDURE — 99205 OFFICE O/P NEW HI 60 MIN: CPT

## 2022-02-08 RX ORDER — PREDNISONE 10 MG/1
10 TABLET ORAL
Qty: 50 | Refills: 0 | Status: DISCONTINUED | COMMUNITY
Start: 2021-10-06 | End: 2022-02-08

## 2022-02-08 RX ORDER — RIVAROXABAN 20 MG/1
20 TABLET, FILM COATED ORAL
Qty: 90 | Refills: 3 | Status: DISCONTINUED | COMMUNITY
Start: 2022-02-08 | End: 2022-02-08

## 2022-02-08 RX ORDER — PREDNISONE 10 MG/1
10 TABLET ORAL
Qty: 21 | Refills: 0 | Status: DISCONTINUED | COMMUNITY
Start: 2022-01-23 | End: 2022-02-08

## 2022-02-08 RX ORDER — FLUTICASONE FUROATE AND VILANTEROL TRIFENATATE 200; 25 UG/1; UG/1
200-25 POWDER RESPIRATORY (INHALATION) DAILY
Qty: 3 | Refills: 1 | Status: DISCONTINUED | COMMUNITY
Start: 2017-08-23 | End: 2022-02-08

## 2022-02-08 RX ORDER — MONTELUKAST 10 MG/1
10 TABLET, FILM COATED ORAL
Qty: 1 | Refills: 3 | Status: DISCONTINUED | COMMUNITY
Start: 2022-01-23 | End: 2022-02-08

## 2022-02-08 RX ORDER — FLUTICASONE FUROATE AND VILANTEROL TRIFENATATE 200; 25 UG/1; UG/1
200-25 POWDER RESPIRATORY (INHALATION) EVERY MORNING
Qty: 1 | Refills: 3 | Status: DISCONTINUED | COMMUNITY
Start: 2022-01-23 | End: 2022-02-08

## 2022-02-08 RX ORDER — OLOPATADINE HYDROCHLORIDE 665 UG/1
0.6 SPRAY, METERED NASAL TWICE DAILY
Qty: 1 | Refills: 3 | Status: DISCONTINUED | COMMUNITY
Start: 2022-01-23 | End: 2022-02-08

## 2022-02-08 RX ORDER — LABETALOL HYDROCHLORIDE 300 MG/1
300 TABLET, FILM COATED ORAL
Refills: 0 | Status: DISCONTINUED | COMMUNITY
End: 2022-02-08

## 2022-02-08 RX ORDER — FLUTICASONE FUROATE AND VILANTEROL TRIFENATATE 200; 25 UG/1; UG/1
200-25 POWDER RESPIRATORY (INHALATION) DAILY
Qty: 3 | Refills: 1 | Status: DISCONTINUED | COMMUNITY
Start: 2017-11-28 | End: 2022-02-08

## 2022-02-09 ENCOUNTER — APPOINTMENT (OUTPATIENT)
Dept: CARDIOLOGY | Facility: CLINIC | Age: 77
End: 2022-02-09
Payer: MEDICARE

## 2022-02-09 VITALS
OXYGEN SATURATION: 98 % | WEIGHT: 186 LBS | SYSTOLIC BLOOD PRESSURE: 124 MMHG | TEMPERATURE: 98 F | BODY MASS INDEX: 27.87 KG/M2 | DIASTOLIC BLOOD PRESSURE: 77 MMHG | HEIGHT: 68.5 IN | HEART RATE: 64 BPM

## 2022-02-09 LAB
AT III PPP CHRO-ACNC: 130 %
B2 GLYCOPROT1 AB SER QL: NEGATIVE
CARDIOLIPIN AB SER IA-ACNC: NEGATIVE
PROT S AG ACT/NOR PPP IA: 98 %
PSA SERPL-MCNC: 11.1 NG/ML

## 2022-02-09 PROCEDURE — 99214 OFFICE O/P EST MOD 30 MIN: CPT

## 2022-02-09 NOTE — PHYSICAL EXAM
[General Appearance - Well Developed] : well developed [Normal Appearance] : normal appearance [Well Groomed] : well groomed [General Appearance - Well Nourished] : well nourished [No Deformities] : no deformities [General Appearance - In No Acute Distress] : no acute distress [Normal Conjunctiva] : the conjunctiva exhibited no abnormalities [Eyelids - No Xanthelasma] : the eyelids demonstrated no xanthelasmas [Normal Oral Mucosa] : normal oral mucosa [No Oral Pallor] : no oral pallor [No Oral Cyanosis] : no oral cyanosis [Normal Jugular Venous A Waves Present] : normal jugular venous A waves present [Normal Jugular Venous V Waves Present] : normal jugular venous V waves present [No Jugular Venous Marshall A Waves] : no jugular venous marshall A waves [Respiration, Rhythm And Depth] : normal respiratory rhythm and effort [Exaggerated Use Of Accessory Muscles For Inspiration] : no accessory muscle use [Auscultation Breath Sounds / Voice Sounds] : lungs were clear to auscultation bilaterally [Heart Rate And Rhythm] : heart rate and rhythm were normal [Heart Sounds] : normal S1 and S2 [Murmurs] : no murmurs present [Abdomen Soft] : soft [Abdomen Tenderness] : non-tender [Abdomen Mass (___ Cm)] : no abdominal mass palpated [Abnormal Walk] : normal gait [Gait - Sufficient For Exercise Testing] : the gait was sufficient for exercise testing [Nail Clubbing] : no clubbing of the fingernails [Cyanosis, Localized] : no localized cyanosis [Petechial Hemorrhages (___cm)] : no petechial hemorrhages [Skin Color & Pigmentation] : normal skin color and pigmentation [] : no rash [No Venous Stasis] : no venous stasis [Skin Lesions] : no skin lesions [No Skin Ulcers] : no skin ulcer [No Xanthoma] : no  xanthoma was observed

## 2022-02-09 NOTE — ASSESSMENT
[FreeTextEntry1] : Assessment:\par 1.  Unprovoked PE\par 2.  Elevated PSA\par 3.  Enlarged prostate\par 4.  RV function decreased\par \par \par Plan:\par 1.  Echocardiogram to re-assess RV function \par 2.  Continue Xarelto as planned\par 3.  Do not stop the xarelto \par 4.  Followup with urology for PSA  - no biopsy allowed at this moment needs to stay on eliquis\par 5.  BP well controlled\par 6.  Minimum duration of 6 months of therapy, then will consider half dose therapies pending clinical course.  \par 7.  Return in 6 weeks.

## 2022-02-09 NOTE — REASON FOR VISIT
[Follow-Up - From Hospitalization] : follow-up of a recent hospitalization for [FreeTextEntry2] : PE [FreeTextEntry1] : 2/9/2022\par Xarelto 15mg BID\par Will transition to Xaerlto 20mg daily \par No bleeding issues.\par No leg swelling\par Bp is well controlled\par Breathing is fine\par Seen by Dr. Carroll - hypercoag workup sent. \par \par Medications:\par Labatelol 200mg BID\par Amlodipine 10mg daily \par Xarleto 15mg BID\par tamsulosin\par Breo\par Atorva 40\par \par Plans to see Dr. Zamora in March \par \par \par

## 2022-02-10 LAB
CARDIOLIPIN IGM SER-MCNC: 6.2 GPL
CARDIOLIPIN IGM SER-MCNC: 7.3 MPL
PROT S FREE PPP-ACNC: 173 %
PTR INTERP: NORMAL

## 2022-02-10 NOTE — HISTORY OF PRESENT ILLNESS
[Disease:__________________________] : Disease: [unfilled] [Date: ____________] : Patient's last distress assessment performed on [unfilled]. [4 - Distress Level] : Distress Level: 4 [de-identified] : Hammad Amato is a 76 year old male with a history of pulmonary embolism 01/2022.\par He describes a normal January 2022 with occasional coughing; as time went forward, he became more short of breath. he felt extreme shortness of breath in supermarket and could not breathe for "..quite awhile.." at least 5 minutes. Efforts to breath improved at home. THere is a history of asthmatic bronchitis more so in cold weather months( Aris Shields).\par He had an ECG several days later and ECG changes noted; he was sent [facility].\par He had pulmonary  CT angiogram 01/25/2022 showing extensive (segmental , lobar)  bilateral pulmonary emboli involving all 5 lobes.  [FreeTextEntry1] : xarelto [de-identified] : since discharge form the hospital he has been feeling well. no chest pain. no dyspnea walking on straint level plain.\par No recent respiratory infection

## 2022-02-10 NOTE — ASSESSMENT
[Supportive] : Goals of care discussed with patient: Supportive [Palliative Care Plan] : not applicable at this time [FreeTextEntry1] : Hammad Amato is a 76 year old male with a history of cold induced asthmatic bronchitis; he was in ussual state of fausto until he developed acute dyspnea without hemoptysis or chest pain in January 2022. Evaluation by ECG showed right heart strain change in vector axis and he was admitted to [facility]; he had negative enzymes but arterial desaturation tachycardia and CT angiogram pulmonary resulted in acute pulmonary embolism. No leg edema ano leg pain and negative evaluation for deep venous thrombosis.\par He did well on IV heparin and he is now on day 8 rivaroxaban 15 mg PO BID.\par Discussion of risk factors for pulmonary embolism revealed only the occurrence of COVID 19 disease in December 2020; 13 months antecedent.\par I will request the assessment for collagen vascular disease antibodies; and rare deficiency states including Protein S , APC resistance reflex FVL, antithrombin 3. 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

## 2022-02-10 NOTE — REASON FOR VISIT
[Initial Consultation] : an initial consultation for [Family Member] : family member [Other: _____] : [unfilled] [Blood Count Assessment] : blood count assessment [Coagulopathy] : coagulopathy [FreeTextEntry2] : pulmonary embolism ; now on anticoagulation

## 2022-02-10 NOTE — REVIEW OF SYSTEMS
[Fatigue] : fatigue [Loss of Hearing] : loss of hearing [Fever] : no fever [Chills] : no chills [Night Sweats] : no night sweats [Recent Change In Weight] : ~T no recent weight change [Eye Pain] : no eye pain [Red Eyes] : eyes not red [Dry Eyes] : no dryness of the eyes [Vision Problems] : no vision problems [Dysphagia] : no dysphagia [Nosebleeds] : no nosebleeds [Hoarseness] : no hoarseness [Odynophagia] : no odynophagia [Mucosal Pain] : no mucosal pain [Chest Pain] : no chest pain [Palpitations] : no palpitations [Leg Claudication] : no intermittent leg claudication [Lower Ext Edema] : no lower extremity edema [Shortness Of Breath] : no shortness of breath [Wheezing] : no wheezing [Cough] : no cough [SOB on Exertion] : no shortness of breath during exertion [Abdominal Pain] : no abdominal pain [Vomiting] : no vomiting [Constipation] : no constipation [Diarrhea] : no diarrhea [Dysuria] : no dysuria [Incontinence] : no incontinence [Joint Pain] : no joint pain [Joint Stiffness] : no joint stiffness [Muscle Pain] : no muscle pain [Skin Rash] : no skin rash [Skin Wound] : no skin wound [Confused] : no confusion [Dizziness] : no dizziness [Fainting] : no fainting [Difficulty Walking] : no difficulty walking [Insomnia] : no insomnia [Anxiety] : no anxiety [Depression] : no depression [Proptosis] : no proptosis [Hot Flashes] : no hot flashes [Muscle Weakness] : no muscle weakness [Deepening Of The Voice] : no deepening of the voice [Easy Bleeding] : no tendency for easy bleeding [Easy Bruising] : no tendency for easy bruising [Swollen Glands] : no swollen glands [FreeTextEntry4] : mild hearing loss [de-identified] : light headiness

## 2022-02-11 LAB
ANA SER IF-ACNC: NEGATIVE
APCR PPP: 3.68 RATIO
FVL BLANK: 38.9
FVL TEST: 143

## 2022-02-14 ENCOUNTER — APPOINTMENT (OUTPATIENT)
Dept: HEMATOLOGY ONCOLOGY | Facility: CLINIC | Age: 77
End: 2022-02-14
Payer: MEDICARE

## 2022-02-14 PROCEDURE — 99443: CPT | Mod: 95

## 2022-02-16 NOTE — HISTORY OF PRESENT ILLNESS
[Date: ____________] : Patient's last distress assessment performed on [unfilled]. [4 - Distress Level] : Distress Level: 4 [Disease:__________________________] : Disease: [unfilled] [Home] : at home, [unfilled] , at the time of the visit. [Medical Office: ([facility])___] : at the medical office located in  [Family Member] : family member [Other:____] : [unfilled] [FreeTextEntry3] : Miles [FreeTextEntry1] : Xarelto 20 mg PO daily [de-identified] : since discharge form the hospital he has been feeling well. no chest pain. no dyspnea walking on straight level plain.\par No recent respiratory infection [de-identified] : Hamamd Amato is a 76 year old male with a history of pulmonary embolism 01/2022.\par He describes a normal January 2022 with occasional coughing; as time went forward, he became more short of breath. he felt extreme shortness of breath in supermarket and could not breathe for "..quite awhile.." at least 5 minutes. Efforts to breath improved at home. THere is a history of asthmatic bronchitis more so in cold weather months( Aris Shields).\par He had an ECG several days later and ECG changes noted; he was sent to [facility].\par He had pulmonary  CT angiogram 01/25/2022 showing extensive (segmental , lobar)  bilateral pulmonary emboli involving all 5 lobes.

## 2022-02-16 NOTE — REVIEW OF SYSTEMS
[Fatigue] : fatigue [Loss of Hearing] : loss of hearing [Fever] : no fever [Chills] : no chills [Night Sweats] : no night sweats [Recent Change In Weight] : ~T no recent weight change [Eye Pain] : no eye pain [Red Eyes] : eyes not red [Dry Eyes] : no dryness of the eyes [Vision Problems] : no vision problems [Dysphagia] : no dysphagia [Nosebleeds] : no nosebleeds [Hoarseness] : no hoarseness [Odynophagia] : no odynophagia [Mucosal Pain] : no mucosal pain [Chest Pain] : no chest pain [Palpitations] : no palpitations [Leg Claudication] : no intermittent leg claudication [Lower Ext Edema] : no lower extremity edema [Shortness Of Breath] : no shortness of breath [Wheezing] : no wheezing [Cough] : no cough [SOB on Exertion] : no shortness of breath during exertion [Abdominal Pain] : no abdominal pain [Vomiting] : no vomiting [Constipation] : no constipation [Diarrhea] : no diarrhea [Dysuria] : no dysuria [Incontinence] : no incontinence [Joint Pain] : no joint pain [Joint Stiffness] : no joint stiffness [Muscle Pain] : no muscle pain [Skin Rash] : no skin rash [Skin Wound] : no skin wound [Confused] : no confusion [Dizziness] : no dizziness [Fainting] : no fainting [Difficulty Walking] : no difficulty walking [Insomnia] : no insomnia [Anxiety] : no anxiety [Depression] : no depression [Proptosis] : no proptosis [Hot Flashes] : no hot flashes [Muscle Weakness] : no muscle weakness [Deepening Of The Voice] : no deepening of the voice [Easy Bleeding] : no tendency for easy bleeding [Easy Bruising] : no tendency for easy bruising [Swollen Glands] : no swollen glands [FreeTextEntry4] : mild hearing loss [de-identified] : light headiness

## 2022-02-16 NOTE — REASON FOR VISIT
[Initial Consultation] : an initial consultation for [Blood Count Assessment] : blood count assessment [Coagulopathy] : coagulopathy [Family Member] : family member [Other: _____] : [unfilled] [Follow-Up Visit] : a follow-up visit for [FreeTextEntry2] : pulmonary embolism ; now on anticoagulation

## 2022-02-16 NOTE — REVIEW OF SYSTEMS
[Fatigue] : fatigue [Loss of Hearing] : loss of hearing [Fever] : no fever [Chills] : no chills [Night Sweats] : no night sweats [Recent Change In Weight] : ~T no recent weight change [Eye Pain] : no eye pain [Red Eyes] : eyes not red [Dry Eyes] : no dryness of the eyes [Vision Problems] : no vision problems [Dysphagia] : no dysphagia [Nosebleeds] : no nosebleeds [Hoarseness] : no hoarseness [Odynophagia] : no odynophagia [Mucosal Pain] : no mucosal pain [Chest Pain] : no chest pain [Palpitations] : no palpitations [Leg Claudication] : no intermittent leg claudication [Lower Ext Edema] : no lower extremity edema [Shortness Of Breath] : no shortness of breath [Wheezing] : no wheezing [Cough] : no cough [SOB on Exertion] : no shortness of breath during exertion [Abdominal Pain] : no abdominal pain [Vomiting] : no vomiting [Constipation] : no constipation [Diarrhea] : no diarrhea [Dysuria] : no dysuria [Incontinence] : no incontinence [Joint Pain] : no joint pain [Joint Stiffness] : no joint stiffness [Muscle Pain] : no muscle pain [Skin Rash] : no skin rash [Skin Wound] : no skin wound [Confused] : no confusion [Dizziness] : no dizziness [Fainting] : no fainting [Difficulty Walking] : no difficulty walking [Insomnia] : no insomnia [Anxiety] : no anxiety [Depression] : no depression [Proptosis] : no proptosis [Hot Flashes] : no hot flashes [Muscle Weakness] : no muscle weakness [Deepening Of The Voice] : no deepening of the voice [Easy Bleeding] : no tendency for easy bleeding [Easy Bruising] : no tendency for easy bruising [Swollen Glands] : no swollen glands [FreeTextEntry4] : mild hearing loss [de-identified] : light headiness

## 2022-02-16 NOTE — ASSESSMENT
[Supportive] : Goals of care discussed with patient: Supportive [Palliative Care Plan] : not applicable at this time [FreeTextEntry1] : Hammad Amato is a 76 year old male with a history of cold induced asthmatic bronchitis; he was in usual state of fausto until he developed acute dyspnea without hemoptysis or chest pain in January 2022. Evaluation by ECG showed right heart strain change in vector axis and he was admitted to [facility]; he had negative enzymes but arterial desaturation tachycardia and CT angiogram pulmonary resulted in acute pulmonary embolism. No leg edema ano leg pain and negative evaluation for deep venous thrombosis.\par He did well on IV heparin and he is now on day 8 rivaroxaban 15 mg PO BID.\par Discussion of risk factors for pulmonary embolism revealed only the occurrence of COVID 19 disease in December 2020; 13 months antecedent.\par I will request the assessment for collagen vascular disease antibodies; and rare deficiency states including Protein S , APC resistance reflex FVL, antithrombin 3. PT G M,PSA; the PSA has been elevated and he will follow up with Nevaeh Monsivais.Abdominal . MR of prostate on March 2 2022 with follow up March 16\par \par Discussion of the case control studies showing 10% RR of patient diagnosed to have metastatic cancer to have had a prior (retrospective study) deep venous thrombosis/pulmonary embolism.\par \par Follow up with me in 2 months.\par Xarelto planned for 6 months treatment\par

## 2022-02-16 NOTE — HISTORY OF PRESENT ILLNESS
[Date: ____________] : Patient's last distress assessment performed on [unfilled]. [4 - Distress Level] : Distress Level: 4 [Disease:__________________________] : Disease: [unfilled] [Home] : at home, [unfilled] , at the time of the visit. [Medical Office: ([facility])___] : at the medical office located in  [Family Member] : family member [Other:____] : [unfilled] [FreeTextEntry3] : Miles [FreeTextEntry1] : Xarelto 20 mg PO daily [de-identified] : since discharge form the hospital he has been feeling well. no chest pain. no dyspnea walking on straight level plain.\par No recent respiratory infection [de-identified] : Hammad Amato is a 76 year old male with a history of pulmonary embolism 01/2022.\par He describes a normal January 2022 with occasional coughing; as time went forward, he became more short of breath. he felt extreme shortness of breath in supermarket and could not breathe for "..quite awhile.." at least 5 minutes. Efforts to breath improved at home. THere is a history of asthmatic bronchitis more so in cold weather months( Aris Shields).\par He had an ECG several days later and ECG changes noted; he was sent to [facility].\par He had pulmonary  CT angiogram 01/25/2022 showing extensive (segmental , lobar)  bilateral pulmonary emboli involving all 5 lobes.

## 2022-02-16 NOTE — ASSESSMENT
[Supportive] : Goals of care discussed with patient: Supportive [Palliative Care Plan] : not applicable at this time [FreeTextEntry1] : Hammad Amato is a 76 year old male with a history of cold induced asthmatic bronchitis; he was in usual state of fausto until he developed acute dyspnea without hemoptysis or chest pain in January 2022. Evaluation by ECG showed right heart strain change in vector axis and he was admitted to [facility]; he had negative enzymes but arterial desaturation tachycardia and CT angiogram pulmonary resulted in acute pulmonary embolism. No leg edema ano leg pain and negative evaluation for deep venous thrombosis.\par He did well on IV heparin and he is now on day 8 rivaroxaban 15 mg PO BID.\par Discussion of risk factors for pulmonary embolism revealed only the occurrence of COVID 19 disease in December 2020; 13 months antecedent.\par I will request the assessment for collagen vascular disease antibodies; and rare deficiency states including Protein S , APC resistance reflex FVL, antithrombin 3. PT G M,PSA; the PSA has been elevated and he will follow up with Nevaeh Monsivais.Abdominal . MR of prostate on March 2 2022 with follow up March 16\par \par Discussion of the case control studies showing 10% RR of patient diagnosed to have metastatic cancer to have had a prior (retrospective study) deep venous thrombosis/pulmonary embolism.\par \par Follow up with me in 2 months.\par Xarelto planned for 6 months treatment\par

## 2022-02-21 LAB — JAK2 P.V617F BLD/T QL: NORMAL

## 2022-02-21 RX ORDER — FLUTICASONE FUROATE AND VILANTEROL TRIFENATATE 200; 25 UG/1; UG/1
200-25 POWDER RESPIRATORY (INHALATION)
Qty: 3 | Refills: 3 | Status: ACTIVE | COMMUNITY
Start: 2022-02-21 | End: 1900-01-01

## 2022-03-02 ENCOUNTER — APPOINTMENT (OUTPATIENT)
Dept: MRI IMAGING | Facility: IMAGING CENTER | Age: 77
End: 2022-03-02
Payer: MEDICARE

## 2022-03-02 ENCOUNTER — OUTPATIENT (OUTPATIENT)
Dept: OUTPATIENT SERVICES | Facility: HOSPITAL | Age: 77
LOS: 1 days | End: 2022-03-02

## 2022-03-02 ENCOUNTER — OUTPATIENT (OUTPATIENT)
Dept: OUTPATIENT SERVICES | Facility: HOSPITAL | Age: 77
LOS: 1 days | End: 2022-03-02
Payer: MEDICARE

## 2022-03-02 ENCOUNTER — RESULT REVIEW (OUTPATIENT)
Age: 77
End: 2022-03-02

## 2022-03-02 ENCOUNTER — APPOINTMENT (OUTPATIENT)
Dept: CV DIAGNOSITCS | Facility: HOSPITAL | Age: 77
End: 2022-03-02

## 2022-03-02 DIAGNOSIS — I26.09 OTHER PULMONARY EMBOLISM WITH ACUTE COR PULMONALE: ICD-10-CM

## 2022-03-02 DIAGNOSIS — R06.02 SHORTNESS OF BREATH: ICD-10-CM

## 2022-03-02 DIAGNOSIS — Z01.812 ENCOUNTER FOR PREPROCEDURAL LABORATORY EXAMINATION: ICD-10-CM

## 2022-03-02 PROCEDURE — A9585: CPT

## 2022-03-02 PROCEDURE — 93306 TTE W/DOPPLER COMPLETE: CPT | Mod: 26

## 2022-03-02 PROCEDURE — 93306 TTE W/DOPPLER COMPLETE: CPT

## 2022-03-02 PROCEDURE — G1004: CPT

## 2022-03-02 PROCEDURE — 76498 UNLISTED MR PROCEDURE: CPT

## 2022-03-02 PROCEDURE — 76498P: CUSTOM | Mod: 26,MH

## 2022-03-02 PROCEDURE — 72197 MRI PELVIS W/O & W/DYE: CPT | Mod: ME

## 2022-03-02 PROCEDURE — 72197 MRI PELVIS W/O & W/DYE: CPT | Mod: 26,ME

## 2022-03-07 ENCOUNTER — NON-APPOINTMENT (OUTPATIENT)
Age: 77
End: 2022-03-07

## 2022-03-15 ENCOUNTER — APPOINTMENT (OUTPATIENT)
Dept: ULTRASOUND IMAGING | Facility: CLINIC | Age: 77
End: 2022-03-15
Payer: MEDICARE

## 2022-03-15 ENCOUNTER — OUTPATIENT (OUTPATIENT)
Dept: OUTPATIENT SERVICES | Facility: HOSPITAL | Age: 77
LOS: 1 days | End: 2022-03-15
Payer: MEDICARE

## 2022-03-15 DIAGNOSIS — I82.409 ACUTE EMBOLISM AND THROMBOSIS OF UNSPECIFIED DEEP VEINS OF UNSPECIFIED LOWER EXTREMITY: ICD-10-CM

## 2022-03-15 PROCEDURE — 93970 EXTREMITY STUDY: CPT | Mod: 26

## 2022-03-15 PROCEDURE — 93970 EXTREMITY STUDY: CPT

## 2022-03-16 ENCOUNTER — NON-APPOINTMENT (OUTPATIENT)
Age: 77
End: 2022-03-16

## 2022-03-16 ENCOUNTER — APPOINTMENT (OUTPATIENT)
Dept: CARDIOLOGY | Facility: CLINIC | Age: 77
End: 2022-03-16
Payer: MEDICARE

## 2022-03-16 ENCOUNTER — APPOINTMENT (OUTPATIENT)
Dept: PULMONOLOGY | Facility: CLINIC | Age: 77
End: 2022-03-16
Payer: MEDICARE

## 2022-03-16 ENCOUNTER — OUTPATIENT (OUTPATIENT)
Dept: OUTPATIENT SERVICES | Facility: HOSPITAL | Age: 77
LOS: 1 days | End: 2022-03-16
Payer: MEDICARE

## 2022-03-16 ENCOUNTER — APPOINTMENT (OUTPATIENT)
Dept: UROLOGY | Facility: CLINIC | Age: 77
End: 2022-03-16
Payer: MEDICARE

## 2022-03-16 VITALS
HEIGHT: 68.5 IN | TEMPERATURE: 97.6 F | WEIGHT: 188 LBS | RESPIRATION RATE: 16 BRPM | BODY MASS INDEX: 28.17 KG/M2 | DIASTOLIC BLOOD PRESSURE: 70 MMHG | OXYGEN SATURATION: 97 % | HEART RATE: 67 BPM | SYSTOLIC BLOOD PRESSURE: 120 MMHG

## 2022-03-16 VITALS
SYSTOLIC BLOOD PRESSURE: 117 MMHG | DIASTOLIC BLOOD PRESSURE: 68 MMHG | WEIGHT: 187 LBS | BODY MASS INDEX: 28.02 KG/M2 | OXYGEN SATURATION: 97 % | TEMPERATURE: 97.2 F | HEART RATE: 67 BPM

## 2022-03-16 DIAGNOSIS — Z71.89 OTHER SPECIFIED COUNSELING: ICD-10-CM

## 2022-03-16 DIAGNOSIS — R35.0 FREQUENCY OF MICTURITION: ICD-10-CM

## 2022-03-16 DIAGNOSIS — E66.3 OVERWEIGHT: ICD-10-CM

## 2022-03-16 PROCEDURE — 94729 DIFFUSING CAPACITY: CPT

## 2022-03-16 PROCEDURE — 94010 BREATHING CAPACITY TEST: CPT

## 2022-03-16 PROCEDURE — 99214 OFFICE O/P EST MOD 30 MIN: CPT | Mod: CS,25

## 2022-03-16 PROCEDURE — 95012 NITRIC OXIDE EXP GAS DETER: CPT

## 2022-03-16 PROCEDURE — 96402 CHEMO HORMON ANTINEOPL SQ/IM: CPT

## 2022-03-16 PROCEDURE — 99214 OFFICE O/P EST MOD 30 MIN: CPT

## 2022-03-16 PROCEDURE — 94726 PLETHYSMOGRAPHY LUNG VOLUMES: CPT

## 2022-03-16 PROCEDURE — 93000 ELECTROCARDIOGRAM COMPLETE: CPT

## 2022-03-16 PROCEDURE — 99214 OFFICE O/P EST MOD 30 MIN: CPT | Mod: 25

## 2022-03-16 PROCEDURE — 96402U: CUSTOM | Mod: NC

## 2022-03-16 RX ORDER — FLUTICASONE FUROATE AND VILANTEROL TRIFENATATE 200; 25 UG/1; UG/1
200-25 POWDER RESPIRATORY (INHALATION)
Qty: 3 | Refills: 3 | Status: ACTIVE | COMMUNITY
Start: 2022-03-16 | End: 1900-01-01

## 2022-03-16 RX ORDER — LEUPROLIDE ACETATE 30 MG/.5ML
30 INJECTION, SUSPENSION, EXTENDED RELEASE SUBCUTANEOUS DAILY
Qty: 1 | Refills: 0 | Status: COMPLETED | OUTPATIENT
Start: 2022-03-15 | End: 2022-03-16

## 2022-03-16 RX ORDER — LEUPROLIDE ACETATE 30 MG/.5ML
30 INJECTION, SUSPENSION, EXTENDED RELEASE SUBCUTANEOUS
Refills: 0 | Status: COMPLETED | OUTPATIENT
Start: 2022-03-16

## 2022-03-16 RX ADMIN — LEUPROLIDE ACETATE 0 MG: KIT SUBCUTANEOUS at 00:00

## 2022-03-16 NOTE — ADDENDUM
[FreeTextEntry1] : Documented by Alberto Whipple acting as a scribe for Dr. Aris Shields on 03/16/2022\par \par All medical record entries made by the scribe were at my, Dr. Aris Shields's, direction and personally dictated by me on 03/16/2022. I have reviewed the chart and agree that the record accurately reflects my personal performance of the history, physical exam, assessment, and plan. I have also personally directed, reviewed, and agree with the discharge instructions.

## 2022-03-16 NOTE — ASSESSMENT
[FreeTextEntry1] : Mr. Amato is a 76 year old male with a history of s/p COVID 19 infection 01/2021, asthma, past PE/DVT 2022, allergy, BPH, overweight, GERD, and OSAS. His number one issue today is prostate CA\par \par His shortness of breath is multifactorial due to:\par -poor mechanics of breathing \par -out of shape / overweight\par -pulmonary disease -(asthma)\par \par problem 1: asthma -(quiet)\par -continue Singulair 10 mg QHS \par -continue Breo Ellipta 200 at 1 inhalation QD\par -continue Ventolin PRN and before exercise\par -Asthma is  believed to be caused by inherited (genetic) and environmental factor, but its exact cause is unknown. Asthma may be triggered by allergens, lung infections, or irritants in the air. Asthma triggers are different for each person\par -Inhaler technique reviewed as well as oral hygiene techniques reviewed with patient. Avoidance of cold air, extremes of temperature, rescue inhaler should be used before exercise. Order of medication reviewed with patient. Recommended use of a cool mist humidifier in the bedroom.\par \par Problem 1A: PE/DVT\par -Continue Xarelto 20 QDay\par -Continue following-up with Dr. Carroll ([facility])\par -Blood clots are noted w/in your lungs diagnosed by either abnormal CTPA or ventilation perfusion scan. You will need a hypercoagulable work up done by a hematologist to attempt to identify the etiology of this problem. Anticoagulation will be needed for at least 6 months- drugs included are Coumadin, lovenox, arixtra, or another agent. Repeat CTPA or VQ scan will be needed in approximately 6-8 weeks. An echocardiogram may be needed to assess for right ventricular function and pulmonary hypertension. The importance of hydration, ambulation, and regular/consistent diet are extremely important. \par \par problem 2: post nasal drip syndrome / allergies\par -continue Clarinex 5 mg QHS \par -continue Olopatadine 0.6% 1 sniff/nostril BID\par -Environmental measures for allergies were encouraged including mattress and pillow cover, air purifier, and environmental controls.\par \par problem 3: GERD\par -continue Pepcid 40mg QHS \par -decrease caffeine \par -Rule of 2s: avoid eating too much, eating too late, eating too spicy, eating two hours before bed\par -Things to avoid including overeating, spicy foods, tight clothing, eating within three hours of bed, this list is not all inclusive. \par -For treatment of reflux, possible options discussed including diet control, H2 blockers, PPIs, as well as coating motility agents discussed as treatment options. Timing of meals and proximity of last meal to sleep were discussed. If symptoms persist, a formal gastrointestinal evaluation is needed.\par \par problem 5: overweight\par -Weight loss, exercise, and diet control were discussed and are highly encouraged. Treatment options were given such as, aqua therapy, and contacting a nutritionist. Recommended to use the elliptical, stationary bike, less use of treadmill.  Obesity is associated with worsening asthma, shortness of breath, and potential for cardiac disease, diabetes, and other underlying medical conditions.\par \par problem 6:  SIOBHAN\par -continue to use the oral appliance\par -repeat the sleep study with the oral appliance in place \par -Sleep apnea is associated with adverse clinical consequences which an affect most organ systems.  Cardiovascular disease risk includes arrhythmias, atrial fibrillation, hypertension, coronary artery disease, and stroke. Metabolic disorders include diabetes type 2, non-alcoholic fatty liver disease. Mood disorder especially depression; and cognitive decline especially in the elderly. Associations with  chronic reflux/Guadarrama’s esophagus some but not all inclusive. \par -Reasons to assess this include arousal consistent with hypopnea; respiratory events most prominent in REM sleep or supine position; therefore sleep staging and body position are important for accurate diagnosis and estimation of AHI.\par \par problem 7: poor breathing mechanics\par -Recommended Wim Hof and Buteyko breathing techniques \par -Proper breathing techniques were reviewed with an emphasis of exhalation. Patient instructed to breath in for 1 second and out for four seconds. Patient was encouraged to not talk while walking.\par \par Problem 8: Health Maintenance/COVID19 Precautions:\par s/p COVID infection 01/2021\par -COVID vaccine d/w pt- await J&J\par - Clean your hands often. Wash your hands often with soap and water for at least 20 seconds, especially after blowing your nose, coughing, or sneezing, or having been in a public place.\par - If soap and water are not available, use a hand  that contains at least 60% alcohol.\par - To the extent possible, avoid touching high-touch surfaces in public places - elevator buttons, door handles, handrails, handshaking with people, etc. Use a tissue or your sleeve to cover your hand or finger if you must touch something.\par - Wash your hands after touching surfaces in public places.\par - Avoid touching your face, nose, eyes, etc.\par - Clean and disinfect your home to remove germs: practice routine cleaning of frequently touched surfaces (for example: tables, doorknobs, light switches, handles, desks, toilets, faucets, sinks & cell phones)\par - Avoid crowds, especially in poorly ventilated spaces. Your risk of exposure to respiratory viruses like COVID-19 may increase in crowded, closed-in settings with little air circulation if\par there are people in the crowd who are sick. All patients are recommended to practice social distancing and stay at least 6 feet away from others.\par - Avoid all non-essential travel including plane trips, and especially avoid embarking on cruise ships.\par -If COVID-19 is spreading in your community, take extra measures to put distance between yourself and other people to further reduce your risk of being exposed to this new virus.\par -Stay home as much as possible.\par - Consider ways of getting food brought to your house through family, social, or commercial networks\par -Be aware that the virus has been known to live in the air up to 3 hours post exposure, cardboard up to 24 hours post exposure, copper up to 4 hours post exposure, steel and plastic up to 2-3 days post exposure. Risk of transmission from these surfaces are affected by many variables.\par \par Immune Support Recommendations:\par -OTC Vitamin C 500mg BID \par -OTC Quercetin 250-500mg BID \par -OTC Zinc 75-100mg per day \par -OTC Melatonin 1or 2mg a night \par -OTC Vitamin D 1-4000mg per day\par -Tonic Water 8oz\par -Recommended to Stay Hydrated (At least a gallon/day)\par \par Asthma and COVID19:\par You need to make sure your asthma is under control. This often requires the use of inhaled corticosteroids (and sometimes oral corticosteroids). Inhaled corticosteroids do not likely reduce your immune system’s ability to fight infections, but oral corticosteroids may. It is important to use the steps above to protect yourself to limit your exposure to any respiratory virus. \par \par problem 9: health maintenance \par -Recommended Cialis 5 mg QHS for nocturia (Steckel)\par -Continue Flomax 4 mg BID\par -He refused an influenza vaccine 2018 / 2019 / 2020\par -recommended strep pneumonia vaccines: Prevnar-13 vaccine, followed by Pneumo vaccine 23 one year following\par -recommended early intervention for URIs\par -recommended regular osteoporosis evaluations\par -recommended early dermatological evaluations\par -recommended after the age of 50 to the age of 70, colonoscopy every 5 years \par \par F/U in 4 months with SPI and NiOx\par He is encouraged to call with any changes, concerns, or questions.

## 2022-03-16 NOTE — PROCEDURE
[FreeTextEntry1] : Full PFT revealed normal flows, with a FEV1 of 2.55L, which is 88% of predicted, normal lung volumes, and a diffusion of 20.6, which is 104% of predicted, with a normal flow volume loop \par \par FENO was 34; a normal value being less than 25. Fractional exhaled nitric oxide (FENO) is regarded as a simple, noninvasive method for assessing eosinophilic airway inflammation. Produced by a variety of cells within the lung, nitric oxide (NO) concentrations are generally low in healthy individuals. However, high concentrations of NO appear to be involved in nonspecific host defense mechanisms and chronic inflammatory  diseases such as asthma. The American Thoracic Society (ATS) therefore recommended using FENO to aid in the diagnosis and monitoring of eosinophilic airway inflammation and asthma, and for identifying steroid responsive individuals whose chronic respiratory symptoms may be caused by airway inflammation

## 2022-03-16 NOTE — REASON FOR VISIT
[Follow-Up - Clinic] : a clinic follow-up of [FreeTextEntry2] : PE [FreeTextEntry1] : 3/16/2022\par \par Repeat PSA was 6.4  (down from 9)\par Seeing Dr. Zamora later today \par On Xarelto 20mg daily \par No leg swelling\par Bp is well controlled\par HR good\par O2 sat good. \par Breathing is fine\par  \par Echo was ok \par \par Medications:\par Labatelol 200mg BID\par Amlodipine 10mg daily \par Xarleto 20mg daily \par tamsulosin\par Breo\par Atorva 40\par Vit D 50K per week.  \par \par \par \par

## 2022-03-17 DIAGNOSIS — C61 MALIGNANT NEOPLASM OF PROSTATE: ICD-10-CM

## 2022-03-17 DIAGNOSIS — R97.20 ELEVATED PROSTATE SPECIFIC ANTIGEN [PSA]: ICD-10-CM

## 2022-03-29 ENCOUNTER — RX RENEWAL (OUTPATIENT)
Age: 77
End: 2022-03-29

## 2022-04-11 ENCOUNTER — OUTPATIENT (OUTPATIENT)
Dept: OUTPATIENT SERVICES | Facility: HOSPITAL | Age: 77
LOS: 1 days | Discharge: HOME | End: 2022-04-11

## 2022-04-11 DIAGNOSIS — I26.99 OTHER PULMONARY EMBOLISM WITHOUT ACUTE COR PULMONALE: ICD-10-CM

## 2022-04-14 ENCOUNTER — APPOINTMENT (OUTPATIENT)
Dept: HEMATOLOGY ONCOLOGY | Facility: CLINIC | Age: 77
End: 2022-04-14

## 2022-06-21 ENCOUNTER — APPOINTMENT (OUTPATIENT)
Dept: ULTRASOUND IMAGING | Facility: IMAGING CENTER | Age: 77
End: 2022-06-21
Payer: MEDICARE

## 2022-06-21 ENCOUNTER — OUTPATIENT (OUTPATIENT)
Dept: OUTPATIENT SERVICES | Facility: HOSPITAL | Age: 77
LOS: 1 days | End: 2022-06-21
Payer: MEDICARE

## 2022-06-21 DIAGNOSIS — I82.409 ACUTE EMBOLISM AND THROMBOSIS OF UNSPECIFIED DEEP VEINS OF UNSPECIFIED LOWER EXTREMITY: ICD-10-CM

## 2022-06-21 PROCEDURE — 93970 EXTREMITY STUDY: CPT | Mod: 26

## 2022-06-21 PROCEDURE — 93970 EXTREMITY STUDY: CPT

## 2022-06-22 ENCOUNTER — APPOINTMENT (OUTPATIENT)
Dept: CARDIOLOGY | Facility: CLINIC | Age: 77
End: 2022-06-22
Payer: MEDICARE

## 2022-06-22 VITALS
TEMPERATURE: 98.8 F | SYSTOLIC BLOOD PRESSURE: 110 MMHG | DIASTOLIC BLOOD PRESSURE: 77 MMHG | OXYGEN SATURATION: 98 % | HEART RATE: 62 BPM

## 2022-06-22 PROCEDURE — 99214 OFFICE O/P EST MOD 30 MIN: CPT

## 2022-06-22 NOTE — ASSESSMENT
[FreeTextEntry1] : Assessment:\par 1.  Unprovoked PE\par 2.  Elevated PSA\par 3.  Enlarged prostate\par 4.  RV function decreased\par \par \par Plan:\par 1.  Continue xarelto 20mg daily  - he has been on full dose a/c since January 2022.  \par 2.  Duplex showing L DVT - which is chronic\par 3.  Hope to transiiton to half dose xarelto, but now that he is on lupron, would keep on full dose.  \par 4.  Repeat duplex in 3 months. \par 5.  If he needs to have a prostate biopsy, he may hold xarleto for 3 full days and then resume the day afterwards.\par 6.  If he comes off lupron, then will switch to half dose xarelto.

## 2022-06-22 NOTE — REASON FOR VISIT
[Follow-Up - Clinic] : a clinic follow-up of [FreeTextEntry2] : PE [FreeTextEntry1] : 6/22/2022\par He was given Lupron x 1 dose in march\par Due for Lupron again July \par PSA improved with Lupron injection.\par Breathing ok\par no blood in the urine or stool\par Xarelto 20mg daily \par Legs feel ok, no leg swelling\par \par \par \par Medications:\par Labatelol 200mg BID\par Amlodipine 10mg daily \par Xarleto 20mg daily \par tamsulosin\par Breo\par Atorva 40\par Vit D 50K per week.  \par monteleukast \par \par

## 2022-07-01 ENCOUNTER — NON-APPOINTMENT (OUTPATIENT)
Age: 77
End: 2022-07-01

## 2022-07-05 ENCOUNTER — RX RENEWAL (OUTPATIENT)
Age: 77
End: 2022-07-05

## 2022-07-28 ENCOUNTER — OUTPATIENT (OUTPATIENT)
Dept: OUTPATIENT SERVICES | Facility: HOSPITAL | Age: 77
LOS: 1 days | End: 2022-07-28
Payer: MEDICARE

## 2022-07-28 ENCOUNTER — NON-APPOINTMENT (OUTPATIENT)
Age: 77
End: 2022-07-28

## 2022-07-28 ENCOUNTER — APPOINTMENT (OUTPATIENT)
Dept: PULMONOLOGY | Facility: CLINIC | Age: 77
End: 2022-07-28

## 2022-07-28 ENCOUNTER — APPOINTMENT (OUTPATIENT)
Dept: UROLOGY | Facility: CLINIC | Age: 77
End: 2022-07-28

## 2022-07-28 VITALS
RESPIRATION RATE: 16 BRPM | WEIGHT: 193 LBS | BODY MASS INDEX: 28.91 KG/M2 | HEIGHT: 68.5 IN | DIASTOLIC BLOOD PRESSURE: 80 MMHG | HEART RATE: 57 BPM | SYSTOLIC BLOOD PRESSURE: 124 MMHG | OXYGEN SATURATION: 98 % | TEMPERATURE: 97.1 F

## 2022-07-28 DIAGNOSIS — I26.09 OTHER PULMONARY EMBOLISM WITH ACUTE COR PULMONALE: ICD-10-CM

## 2022-07-28 DIAGNOSIS — R97.20 ELEVATED PROSTATE SPECIFIC ANTIGEN [PSA]: ICD-10-CM

## 2022-07-28 DIAGNOSIS — R35.0 FREQUENCY OF MICTURITION: ICD-10-CM

## 2022-07-28 DIAGNOSIS — C61 MALIGNANT NEOPLASM OF PROSTATE: ICD-10-CM

## 2022-07-28 DIAGNOSIS — U07.1 COVID-19: ICD-10-CM

## 2022-07-28 DIAGNOSIS — R97.20 ELEVATED PROSTATE, SPECIFIC ANTIGEN [PSA]: ICD-10-CM

## 2022-07-28 DIAGNOSIS — R06.83 SNORING: ICD-10-CM

## 2022-07-28 DIAGNOSIS — J45.909 UNSPECIFIED ASTHMA, UNCOMPLICATED: ICD-10-CM

## 2022-07-28 PROCEDURE — 96402 CHEMO HORMON ANTINEOPL SQ/IM: CPT

## 2022-07-28 PROCEDURE — 94010 BREATHING CAPACITY TEST: CPT

## 2022-07-28 PROCEDURE — 99214 OFFICE O/P EST MOD 30 MIN: CPT | Mod: CS,25

## 2022-07-28 PROCEDURE — 99213 OFFICE O/P EST LOW 20 MIN: CPT | Mod: 25

## 2022-07-28 PROCEDURE — 96402U: CUSTOM | Mod: NC

## 2022-07-28 PROCEDURE — 95012 NITRIC OXIDE EXP GAS DETER: CPT

## 2022-07-28 RX ORDER — LEUPROLIDE ACETATE 30 MG/.5ML
30 INJECTION, SUSPENSION, EXTENDED RELEASE SUBCUTANEOUS
Refills: 0 | Status: COMPLETED | OUTPATIENT
Start: 2022-07-28

## 2022-07-28 RX ORDER — LEUPROLIDE ACETATE 30 MG/.5ML
30 INJECTION, SUSPENSION, EXTENDED RELEASE SUBCUTANEOUS DAILY
Qty: 1 | Refills: 0 | Status: COMPLETED | OUTPATIENT
Start: 2022-07-28 | End: 2022-07-28

## 2022-07-28 RX ADMIN — LEUPROLIDE ACETATE 0 MG: KIT SUBCUTANEOUS at 00:00

## 2022-07-28 NOTE — ASSESSMENT
[FreeTextEntry1] : Mr. Amato is a 76 year old male with a history of s/p COVID 19 infection 01/2021, asthma, past PE/DVT 2022, allergy, BPH, overweight, GERD, and OSAS. His number one issue today is prostate CA; currently on lupron \par \par His shortness of breath is multifactorial due to:\par -poor mechanics of breathing \par -out of shape / overweight\par -pulmonary disease -(asthma)\par \par problem 1: asthma -(quiet)\par -continue Singulair 10 mg QHS \par -continue Breo Ellipta 200 at 1 inhalation QD\par -continue Ventolin PRN and before exercise\par -Asthma is  believed to be caused by inherited (genetic) and environmental factor, but its exact cause is unknown. Asthma may be triggered by allergens, lung infections, or irritants in the air. Asthma triggers are different for each person\par -Inhaler technique reviewed as well as oral hygiene techniques reviewed with patient. Avoidance of cold air, extremes of temperature, rescue inhaler should be used before exercise. Order of medication reviewed with patient. Recommended use of a cool mist humidifier in the bedroom.\par \par Problem 1A: PE/DVT\par -Continue Xarelto 20 QDay\par -Continue following-up with Dr. Carroll ([facility])\par -Blood clots are noted w/in your lungs diagnosed by either abnormal CTPA or ventilation perfusion scan. You will need a hypercoagulable work up done by a hematologist to attempt to identify the etiology of this problem. Anticoagulation will be needed for at least 6 months- drugs included are Coumadin, lovenox, arixtra, or another agent. Repeat CTPA or VQ scan will be needed in approximately 6-8 weeks. An echocardiogram may be needed to assess for right ventricular function and pulmonary hypertension. The importance of hydration, ambulation, and regular/consistent diet are extremely important. \par \par problem 2: post nasal drip syndrome / allergies\par -continue Clarinex 5 mg QHS \par -continue Olopatadine 0.6% 1 sniff/nostril BID\par -Environmental measures for allergies were encouraged including mattress and pillow cover, air purifier, and environmental controls.\par \par problem 3: GERD\par -continue Pepcid 40mg QHS \par -decrease caffeine \par -Rule of 2s: avoid eating too much, eating too late, eating too spicy, eating two hours before bed\par -Things to avoid including overeating, spicy foods, tight clothing, eating within three hours of bed, this list is not all inclusive. \par -For treatment of reflux, possible options discussed including diet control, H2 blockers, PPIs, as well as coating motility agents discussed as treatment options. Timing of meals and proximity of last meal to sleep were discussed. If symptoms persist, a formal gastrointestinal evaluation is needed.\par \par problem 5: overweight\par -Recommended Yon charles on 10 day Detox \par -Weight loss, exercise, and diet control were discussed and are highly encouraged. Treatment options were given such as, aqua therapy, and contacting a nutritionist. Recommended to use the elliptical, stationary bike, less use of treadmill.  Obesity is associated with worsening asthma, shortness of breath, and potential for cardiac disease, diabetes, and other underlying medical conditions.\par \par problem 6:  SIOBHAN\par -continue to use the oral appliance\par -repeat the sleep study with the oral appliance in place \par -Sleep apnea is associated with adverse clinical consequences which an affect most organ systems.  Cardiovascular disease risk includes arrhythmias, atrial fibrillation, hypertension, coronary artery disease, and stroke. Metabolic disorders include diabetes type 2, non-alcoholic fatty liver disease. Mood disorder especially depression; and cognitive decline especially in the elderly. Associations with  chronic reflux/Guadarrama’s esophagus some but not all inclusive. \par -Reasons to assess this include arousal consistent with hypopnea; respiratory events most prominent in REM sleep or supine position; therefore sleep staging and body position are important for accurate diagnosis and estimation of AHI.\par \par problem 7: poor breathing mechanics\par -Recommended Wim Hof and Buteyko breathing techniques \par -Proper breathing techniques were reviewed with an emphasis of exhalation. Patient instructed to breath in for 1 second and out for four seconds. Patient was encouraged to not talk while walking.\par \par Problem 8: Health Maintenance/COVID19 Precautions:\par s/p COVID infection 01/2021\par -unvaccinated \par - Clean your hands often. Wash your hands often with soap and water for at least 20 seconds, especially after blowing your nose, coughing, or sneezing, or having been in a public place.\par - If soap and water are not available, use a hand  that contains at least 60% alcohol.\par - To the extent possible, avoid touching high-touch surfaces in public places - elevator buttons, door handles, handrails, handshaking with people, etc. Use a tissue or your sleeve to cover your hand or finger if you must touch something.\par - Wash your hands after touching surfaces in public places.\par - Avoid touching your face, nose, eyes, etc.\par - Clean and disinfect your home to remove germs: practice routine cleaning of frequently touched surfaces (for example: tables, doorknobs, light switches, handles, desks, toilets, faucets, sinks & cell phones)\par - Avoid crowds, especially in poorly ventilated spaces. Your risk of exposure to respiratory viruses like COVID-19 may increase in crowded, closed-in settings with little air circulation if\par there are people in the crowd who are sick. All patients are recommended to practice social distancing and stay at least 6 feet away from others.\par - Avoid all non-essential travel including plane trips, and especially avoid embarking on cruise ships.\par -If COVID-19 is spreading in your community, take extra measures to put distance between yourself and other people to further reduce your risk of being exposed to this new virus.\par -Stay home as much as possible.\par - Consider ways of getting food brought to your house through family, social, or commercial networks\par -Be aware that the virus has been known to live in the air up to 3 hours post exposure, cardboard up to 24 hours post exposure, copper up to 4 hours post exposure, steel and plastic up to 2-3 days post exposure. Risk of transmission from these surfaces are affected by many variables.\par \par Immune Support Recommendations:\par -OTC Vitamin C 500mg BID \par -OTC Quercetin 250-500mg BID \par -OTC Zinc 75-100mg per day \par -OTC Melatonin 1or 2mg a night \par -OTC Vitamin D 1-4000mg per day\par -Tonic Water 8oz\par -Recommended to Stay Hydrated (At least a gallon/day)\par \par Asthma and COVID19:\par You need to make sure your asthma is under control. This often requires the use of inhaled corticosteroids (and sometimes oral corticosteroids). Inhaled corticosteroids do not likely reduce your immune system’s ability to fight infections, but oral corticosteroids may. It is important to use the steps above to protect yourself to limit your exposure to any respiratory virus. \par \par problem 9: health maintenance \par -Recommended Cialis 5 mg QHS for nocturia (Steckel)\par -Continue Flomax 4 mg BID\par -He refused an influenza vaccine 2018 / 2019 / 2020\par -recommended strep pneumonia vaccines: Prevnar-13 vaccine, followed by Pneumo vaccine 23 one year following\par -recommended early intervention for URIs\par -recommended regular osteoporosis evaluations\par -recommended early dermatological evaluations\par -recommended after the age of 50 to the age of 70, colonoscopy every 5 years \par \par F/U in 4 months with SPI and NiOx\par He is encouraged to call with any changes, concerns, or questions.

## 2022-07-28 NOTE — PROCEDURE
[FreeTextEntry1] : FENO was    8   ; a normal value being less than 25\par Fractional exhaled nitric oxide (FENO) is regarded as a simple, noninvasive method for assessing eosinophilic airway inflammation. Produced by a variety of cells within the lung, nitric oxide (NO) concentrations are generally low in healthy individuals. However, high concentrations of NO appear to be involved in nonspecific host defense mechanisms and chronic inflammatory diseases such as asthma. The American Thoracic Society (ATS) therefore has recommended using FENO to aid in the diagnosis and monitoring of eosinophilic airway inflammation and asthma, and for identifying steroid responsive individuals whose chronic respiratory symptoms may be caused by airway inflammation. \par \par PFT - spi reveals mild restrictive and mild obstructive flows; FEV1 is 1.54  which is 56% of predicted, normal flow volume loop ; no inspiratory limb

## 2022-07-28 NOTE — HISTORY OF PRESENT ILLNESS
[FreeTextEntry1] : Mr. Amato is a 76 year old male presenting to the office today for a follow up visit for allergies, asthma, GERD, SIOBHAN, overweight, snoring, and SOB. His chief complaint is \par -he notes changing his amlodipine from 10 to 5mg \par -he notes dizziness with low BP \par -he notes having been walking as much as he used to \par -he notes feeling well overall \par -pt denies any new medications, supplements, or vitamins \par -he notes being on zeralto \par -he denies vaccines \par patient denies any headaches, nausea, vomiting, fever, chills, sweats, chest pain, chest pressure, palpitations, coughing, wheezing, fatigue, diarrhea, constipation, dysphagia, myalgias, dizziness, leg swelling, leg pain, itchy eyes, itchy ears, heartburn, reflux or sour taste in the mouth

## 2022-07-28 NOTE — ADDENDUM
[FreeTextEntry1] : Documented by Anne-Marie Almonte acting as a scribe for Dr. Aris Shields on 07/28/2022 .\par \par All medical record entries made by the Scribe were at my, Dr. Aris Shields's, direction and personally dictated by me on. I have reviewed the chart and agree that the record accurately reflects my personal performance of the history, physical exam, assessment and plan. I have also personally directed, reviewed, and agree with the discharge instructions.

## 2022-07-29 LAB
APPEARANCE: CLEAR
BACTERIA: NEGATIVE
BILIRUBIN URINE: NEGATIVE
BLOOD URINE: NEGATIVE
COLOR: YELLOW
GLUCOSE QUALITATIVE U: NEGATIVE
HYALINE CASTS: 21 /LPF
KETONES URINE: NEGATIVE
LEUKOCYTE ESTERASE URINE: NEGATIVE
MICROSCOPIC-UA: NORMAL
NITRITE URINE: NEGATIVE
PH URINE: 5.5
PROTEIN URINE: NORMAL
PSA FREE FLD-MCNC: 15 %
PSA FREE SERPL-MCNC: 0.12 NG/ML
PSA SERPL-MCNC: 0.78 NG/ML
RED BLOOD CELLS URINE: 1 /HPF
SPECIFIC GRAVITY URINE: 1.02
SQUAMOUS EPITHELIAL CELLS: 1 /HPF
URINE COMMENTS: NORMAL
UROBILINOGEN URINE: NORMAL
WHITE BLOOD CELLS URINE: 1 /HPF

## 2022-10-09 RX ORDER — ALBUTEROL SULFATE 90 UG/1
108 (90 BASE) AEROSOL, METERED RESPIRATORY (INHALATION) EVERY 6 HOURS
Qty: 1 | Refills: 5 | Status: ACTIVE | COMMUNITY
Start: 2022-10-09 | End: 1900-01-01

## 2022-10-25 ENCOUNTER — RX RENEWAL (OUTPATIENT)
Age: 77
End: 2022-10-25

## 2022-12-08 ENCOUNTER — OUTPATIENT (OUTPATIENT)
Dept: OUTPATIENT SERVICES | Facility: HOSPITAL | Age: 77
LOS: 1 days | End: 2022-12-08
Payer: MEDICARE

## 2022-12-08 ENCOUNTER — APPOINTMENT (OUTPATIENT)
Dept: UROLOGY | Facility: CLINIC | Age: 77
End: 2022-12-08

## 2022-12-08 VITALS — SYSTOLIC BLOOD PRESSURE: 115 MMHG | HEART RATE: 52 BPM | DIASTOLIC BLOOD PRESSURE: 73 MMHG

## 2022-12-08 DIAGNOSIS — R35.0 FREQUENCY OF MICTURITION: ICD-10-CM

## 2022-12-08 LAB
PSA FREE FLD-MCNC: 14 %
PSA FREE SERPL-MCNC: 0.07 NG/ML
PSA SERPL-MCNC: 0.47 NG/ML

## 2022-12-08 PROCEDURE — 96402U: CUSTOM | Mod: NC

## 2022-12-08 PROCEDURE — 99214 OFFICE O/P EST MOD 30 MIN: CPT | Mod: 25

## 2022-12-08 PROCEDURE — 96402 CHEMO HORMON ANTINEOPL SQ/IM: CPT

## 2022-12-08 RX ORDER — LEUPROLIDE ACETATE 30 MG/.5ML
30 INJECTION, SUSPENSION, EXTENDED RELEASE SUBCUTANEOUS DAILY
Qty: 1 | Refills: 0 | Status: COMPLETED | OUTPATIENT
Start: 2022-12-06 | End: 2022-12-08

## 2022-12-08 RX ORDER — LEUPROLIDE ACETATE 30 MG/.5ML
30 INJECTION, SUSPENSION, EXTENDED RELEASE SUBCUTANEOUS
Refills: 0 | Status: COMPLETED | OUTPATIENT
Start: 2022-12-08

## 2022-12-08 RX ADMIN — LEUPROLIDE ACETATE 0 MG: KIT SUBCUTANEOUS at 00:00

## 2022-12-09 ENCOUNTER — APPOINTMENT (OUTPATIENT)
Dept: PULMONOLOGY | Facility: CLINIC | Age: 77
End: 2022-12-09

## 2022-12-09 DIAGNOSIS — C61 MALIGNANT NEOPLASM OF PROSTATE: ICD-10-CM

## 2022-12-09 DIAGNOSIS — R97.20 ELEVATED PROSTATE SPECIFIC ANTIGEN [PSA]: ICD-10-CM

## 2023-01-07 NOTE — ED PROVIDER NOTE - INTERPRETATION
----- Message from Vish Don MD sent at 1/6/2023  6:01 PM CST -----  Please notify patient's mother of the negative quad swab results.  No change in treatment  
No answer. Left message for patients mother to return call to clinic.  
normal

## 2023-01-19 ENCOUNTER — RX RENEWAL (OUTPATIENT)
Age: 78
End: 2023-01-19

## 2023-03-27 ENCOUNTER — RX RENEWAL (OUTPATIENT)
Age: 78
End: 2023-03-27

## 2023-04-13 ENCOUNTER — OUTPATIENT (OUTPATIENT)
Dept: OUTPATIENT SERVICES | Facility: HOSPITAL | Age: 78
LOS: 1 days | End: 2023-04-13
Payer: MEDICARE

## 2023-04-13 ENCOUNTER — APPOINTMENT (OUTPATIENT)
Dept: UROLOGY | Facility: CLINIC | Age: 78
End: 2023-04-13
Payer: MEDICARE

## 2023-04-13 VITALS — SYSTOLIC BLOOD PRESSURE: 129 MMHG | OXYGEN SATURATION: 95 % | DIASTOLIC BLOOD PRESSURE: 77 MMHG | HEART RATE: 48 BPM

## 2023-04-13 DIAGNOSIS — R35.0 FREQUENCY OF MICTURITION: ICD-10-CM

## 2023-04-13 DIAGNOSIS — R97.20 ELEVATED PROSTATE, SPECIFIC ANTIGEN [PSA]: ICD-10-CM

## 2023-04-13 PROCEDURE — 52000 CYSTOURETHROSCOPY: CPT

## 2023-04-13 PROCEDURE — 99214 OFFICE O/P EST MOD 30 MIN: CPT | Mod: 25

## 2023-04-13 PROCEDURE — 96402 CHEMO HORMON ANTINEOPL SQ/IM: CPT

## 2023-04-13 RX ORDER — LEUPROLIDE ACETATE 30 MG/.5ML
30 INJECTION, SUSPENSION, EXTENDED RELEASE SUBCUTANEOUS DAILY
Qty: 1 | Refills: 0 | Status: COMPLETED | OUTPATIENT
Start: 2023-04-11 | End: 2023-04-13

## 2023-04-13 RX ORDER — LEUPROLIDE ACETATE 30 MG/.5ML
30 INJECTION, SUSPENSION, EXTENDED RELEASE SUBCUTANEOUS
Refills: 0 | Status: COMPLETED | OUTPATIENT
Start: 2023-04-13

## 2023-04-13 RX ADMIN — LEUPROLIDE ACETATE 0 MG: KIT SUBCUTANEOUS at 00:00

## 2023-04-14 DIAGNOSIS — C61 MALIGNANT NEOPLASM OF PROSTATE: ICD-10-CM

## 2023-04-14 DIAGNOSIS — R97.20 ELEVATED PROSTATE SPECIFIC ANTIGEN [PSA]: ICD-10-CM

## 2023-04-14 LAB
PSA FREE FLD-MCNC: 13 %
PSA FREE SERPL-MCNC: 0.05 NG/ML
PSA SERPL-MCNC: 0.4 NG/ML

## 2023-04-24 ENCOUNTER — RX RENEWAL (OUTPATIENT)
Age: 78
End: 2023-04-24

## 2023-04-24 RX ORDER — TAMSULOSIN HYDROCHLORIDE 0.4 MG/1
0.4 CAPSULE ORAL
Qty: 90 | Refills: 3 | Status: ACTIVE | COMMUNITY
Start: 2019-03-08 | End: 1900-01-01
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
## 2023-07-13 ENCOUNTER — APPOINTMENT (OUTPATIENT)
Dept: PULMONOLOGY | Facility: CLINIC | Age: 78
End: 2023-07-13
Payer: MEDICARE

## 2023-07-13 ENCOUNTER — RESULT REVIEW (OUTPATIENT)
Age: 78
End: 2023-07-13

## 2023-07-13 ENCOUNTER — APPOINTMENT (OUTPATIENT)
Dept: MRI IMAGING | Facility: IMAGING CENTER | Age: 78
End: 2023-07-13
Payer: MEDICARE

## 2023-07-13 ENCOUNTER — OUTPATIENT (OUTPATIENT)
Dept: OUTPATIENT SERVICES | Facility: HOSPITAL | Age: 78
LOS: 1 days | End: 2023-07-13
Payer: MEDICARE

## 2023-07-13 VITALS
RESPIRATION RATE: 16 BRPM | WEIGHT: 193 LBS | HEART RATE: 63 BPM | OXYGEN SATURATION: 96 % | SYSTOLIC BLOOD PRESSURE: 126 MMHG | HEIGHT: 68 IN | BODY MASS INDEX: 29.25 KG/M2 | DIASTOLIC BLOOD PRESSURE: 78 MMHG

## 2023-07-13 DIAGNOSIS — I26.99 OTHER PULMONARY EMBOLISM W/OUT ACUTE COR PULMONALE: ICD-10-CM

## 2023-07-13 DIAGNOSIS — L25.9 UNSPECIFIED CONTACT DERMATITIS, UNSPECIFIED CAUSE: ICD-10-CM

## 2023-07-13 DIAGNOSIS — J45.909 UNSPECIFIED ASTHMA, UNCOMPLICATED: ICD-10-CM

## 2023-07-13 DIAGNOSIS — R06.02 SHORTNESS OF BREATH: ICD-10-CM

## 2023-07-13 DIAGNOSIS — R97.20 ELEVATED PROSTATE SPECIFIC ANTIGEN [PSA]: ICD-10-CM

## 2023-07-13 DIAGNOSIS — K21.9 GASTRO-ESOPHAGEAL REFLUX DISEASE W/OUT ESOPHAGITIS: ICD-10-CM

## 2023-07-13 DIAGNOSIS — J30.9 ALLERGIC RHINITIS, UNSPECIFIED: ICD-10-CM

## 2023-07-13 DIAGNOSIS — G47.33 OBSTRUCTIVE SLEEP APNEA (ADULT) (PEDIATRIC): ICD-10-CM

## 2023-07-13 PROCEDURE — 99214 OFFICE O/P EST MOD 30 MIN: CPT | Mod: 25

## 2023-07-13 PROCEDURE — 72197 MRI PELVIS W/O & W/DYE: CPT

## 2023-07-13 PROCEDURE — 76498P: CUSTOM | Mod: 26,MH

## 2023-07-13 PROCEDURE — 76498 UNLISTED MR PROCEDURE: CPT

## 2023-07-13 PROCEDURE — A9585: CPT

## 2023-07-13 PROCEDURE — 94010 BREATHING CAPACITY TEST: CPT

## 2023-07-13 PROCEDURE — 94729 DIFFUSING CAPACITY: CPT

## 2023-07-13 PROCEDURE — 95012 NITRIC OXIDE EXP GAS DETER: CPT

## 2023-07-13 PROCEDURE — 72197 MRI PELVIS W/O & W/DYE: CPT | Mod: 26,MH

## 2023-07-13 PROCEDURE — 94727 GAS DIL/WSHOT DETER LNG VOL: CPT

## 2023-07-13 RX ORDER — HYDROCORTISONE 25 MG/G
2.5 OINTMENT TOPICAL TWICE DAILY
Qty: 90 | Refills: 1 | Status: ACTIVE | COMMUNITY
Start: 2023-07-13 | End: 1900-01-01

## 2023-07-13 NOTE — PROCEDURE
[FreeTextEntry1] : Full PFT reveals normal flows; FEV1 was 2.63 L which is 97% of predicted very mild obstruction at mid to low lung volumes; normal lung volumes; normal diffusion at 19.6, which is 101% of predicted; normal flow volume loop.\par PFTs were performed to evaluate for SOB and asthma  \par \par FENO was 18; a normal value being less than 25\par Fractional exhaled nitric oxide (FENO) is regarded as a simple, noninvasive method for assessing eosinophilic airway inflammation. Produced by a variety of cells within the lung, nitric oxide (NO) concentrations are generally low in healthy individuals. However, high concentrations of NO appear to be involved in nonspecific host defense mechanisms and chronic inflammatory diseases such as asthma. The American Thoracic Society (ATS) therefore has recommended using FENO to aid in the diagnosis and monitoring of eosinophilic airway inflammation and asthma, and for identifying steroid responsive individuals whose chronic respiratory symptoms may be caused by airway inflammation.

## 2023-07-13 NOTE — ADDENDUM
[FreeTextEntry1] : Documented by Lili Pham acting as a scribe for Dr. Aris Shields on 07/13/2023 \par \par All medical record entries made by the Scribe were at my, Dr. Aris Shields's, direction and personally dictated by me on 07/13/2023 . I have reviewed the chart and agree that the record accurately reflects my personal performance of the history, physical exam, assessment and plan. I have also personally directed, reviewed, and agree with the discharge instructions

## 2023-07-13 NOTE — HISTORY OF PRESENT ILLNESS
[FreeTextEntry1] : Mr. Amato is a 78 year old male presenting to the office today for a follow up visit for allergies, asthma, GERD, SIOBHAN, overweight, snoring, and SOB. His chief complaint is \par \par -he notes generally feeling good \par -he notes walking dog for exercise \par -he notes bowels are regular \par -he denies dysphonia \par -he notes vision is stable\par -he notes his senses of smell and taste are stable \par -he notes sleep is interrupted 3 times day \par -he notes nocturia despite use of tamulosin \par -he notes hand rash \par -he notes muscle weakness due to lupron \par \par -He denies any visual issues, headaches, nausea, vomiting, fever, chills, sweats, chest pains, chest pressure, diarrhea, constipation, dysphagia, myalgia, dizziness, leg swelling, leg pain, itchy eyes, itchy ears, heartburn, reflux, or sour taste in the mouth.

## 2023-07-13 NOTE — ASSESSMENT
[FreeTextEntry1] : Mr. Amato is a 78 year old male with a history of s/p COVID 19 infection 01/2021, asthma, past PE/DVT 2022, allergy, BPH, overweight, GERD, and OSAS, prostate CA; currently on lupron- #1 issue is hand rash \par \par His shortness of breath is multifactorial due to:\par -poor mechanics of breathing \par -out of shape / overweight\par -pulmonary disease -(asthma)\par \par problem 1: asthma -(quiet)\par -continue Singulair 10 mg QHS \par -continue Breo Ellipta 200 at 1 inhalation QD\par -continue Ventolin PRN and before exercise\par -Asthma is  believed to be caused by inherited (genetic) and environmental factor, but its exact cause is unknown. Asthma may be triggered by allergens, lung infections, or irritants in the air. Asthma triggers are different for each person\par -Inhaler technique reviewed as well as oral hygiene techniques reviewed with patient. Avoidance of cold air, extremes of temperature, rescue inhaler should be used before exercise. Order of medication reviewed with patient. Recommended use of a cool mist humidifier in the bedroom.\par \par Problem 1A: PE/DVT\par -Continue Xarelto 20 QDay\par -Continue following-up with Dr. Carroll ([facility])\par -Blood clots are noted w/in your lungs diagnosed by either abnormal CTPA or ventilation perfusion scan. You will need a hypercoagulable work up done by a hematologist to attempt to identify the etiology of this problem. Anticoagulation will be needed for at least 6 months- drugs included are Coumadin, lovenox, arixtra, or another agent. Repeat CTPA or VQ scan will be needed in approximately 6-8 weeks. An echocardiogram may be needed to assess for right ventricular function and pulmonary hypertension. The importance of hydration, ambulation, and regular/consistent diet are extremely important. \par \par problem 2: post nasal drip syndrome / allergies\par -continue Clarinex 5 mg QHS \par -continue Olopatadine 0.6% 1 sniff/nostril BID\par -Environmental measures for allergies were encouraged including mattress and pillow cover, air purifier, and environmental controls.\par \par problem 3: GERD\par -continue Pepcid 40mg QHS \par -decrease caffeine \par -Rule of 2s: avoid eating too much, eating too late, eating too spicy, eating two hours before bed\par -Things to avoid including overeating, spicy foods, tight clothing, eating within three hours of bed, this list is not all inclusive. \par -For treatment of reflux, possible options discussed including diet control, H2 blockers, PPIs, as well as coating motility agents discussed as treatment options. Timing of meals and proximity of last meal to sleep were discussed. If symptoms persist, a formal gastrointestinal evaluation is needed.\par \par problem 5: overweight\par -Recommended Yon charles on 10 day Detox \par -Weight loss, exercise, and diet control were discussed and are highly encouraged. Treatment options were given such as, aqua therapy, and contacting a nutritionist. Recommended to use the elliptical, stationary bike, less use of treadmill.  Obesity is associated with worsening asthma, shortness of breath, and potential for cardiac disease, diabetes, and other underlying medical conditions.\par \par problem 6:  SIOBHAN\par -continue to use the oral appliance\par -repeat the sleep study with the oral appliance in place \par -Sleep apnea is associated with adverse clinical consequences which an affect most organ systems.  Cardiovascular disease risk includes arrhythmias, atrial fibrillation, hypertension, coronary artery disease, and stroke. Metabolic disorders include diabetes type 2, non-alcoholic fatty liver disease. Mood disorder especially depression; and cognitive decline especially in the elderly. Associations with  chronic reflux/[place]’s esophagus some but not all inclusive. \par -Reasons to assess this include arousal consistent with hypopnea; respiratory events most prominent in REM sleep or supine position; therefore sleep staging and body position are important for accurate diagnosis and estimation of AHI.\par \par problem 7: poor breathing mechanics\par -Recommended Wim Hof and Buteyko breathing techniques \par -Proper breathing techniques were reviewed with an emphasis of exhalation. Patient instructed to breath in for 1 second and out for four seconds. Patient was encouraged to not talk while walking.\par \par Problem 7A- contact dermatitis\par -add hydrocortisone 2.5% cream \par \par Problem 8: Health Maintenance/COVID19 Precautions:\par s/p COVID infection 01/2021\par -unvaccinated \par - Clean your hands often. Wash your hands often with soap and water for at least 20 seconds, especially after blowing your nose, coughing, or sneezing, or having been in a public place.\par - If soap and water are not available, use a hand  that contains at least 60% alcohol.\par - To the extent possible, avoid touching high-touch surfaces in public places - elevator buttons, door handles, handrails, handshaking with people, etc. Use a tissue or your sleeve to cover your hand or finger if you must touch something.\par - Wash your hands after touching surfaces in public places.\par - Avoid touching your face, nose, eyes, etc.\par - Clean and disinfect your home to remove germs: practice routine cleaning of frequently touched surfaces (for example: tables, doorknobs, light switches, handles, desks, toilets, faucets, sinks & cell phones)\par - Avoid crowds, especially in poorly ventilated spaces. Your risk of exposure to respiratory viruses like COVID-19 may increase in crowded, closed-in settings with little air circulation if\par there are people in the crowd who are sick. All patients are recommended to practice social distancing and stay at least 6 feet away from others.\par - Avoid all non-essential travel including plane trips, and especially avoid embarking on cruise ships.\par -If COVID-19 is spreading in your community, take extra measures to put distance between yourself and other people to further reduce your risk of being exposed to this new virus.\par -Stay home as much as possible.\par - Consider ways of getting food brought to your house through family, social, or commercial networks\par -Be aware that the virus has been known to live in the air up to 3 hours post exposure, cardboard up to 24 hours post exposure, copper up to 4 hours post exposure, steel and plastic up to 2-3 days post exposure. Risk of transmission from these surfaces are affected by many variables.\par \par Immune Support Recommendations:\par -OTC Vitamin C 500mg BID \par -OTC Quercetin 250-500mg BID \par -OTC Zinc 75-100mg per day \par -OTC Melatonin 1or 2mg a night \par -OTC Vitamin D 1-4000mg per day\par -Tonic Water 8oz\par -Recommended to Stay Hydrated (At least a gallon/day)\par \par Asthma and COVID19:\par You need to make sure your asthma is under control. This often requires the use of inhaled corticosteroids (and sometimes oral corticosteroids). Inhaled corticosteroids do not likely reduce your immune system’s ability to fight infections, but oral corticosteroids may. It is important to use the steps above to protect yourself to limit your exposure to any respiratory virus. \par \par problem 9: health maintenance \par -Recommended Cialis 5 mg QHS for nocturia (Steckel)\par -Continue Flomax 4 mg BID\par -He refused an influenza vaccine 2018 / 2019 / 2020\par -recommended strep pneumonia vaccines: Prevnar-13 vaccine, followed by Pneumo vaccine 23 one year following\par -recommended early intervention for URIs\par -recommended regular osteoporosis evaluations\par -recommended early dermatological evaluations\par -recommended after the age of 50 to the age of 70, colonoscopy every 5 years \par \par F/U in 4 months with SPI and NiOx\par He is encouraged to call with any changes, concerns, or questions.

## 2023-09-07 ENCOUNTER — APPOINTMENT (OUTPATIENT)
Dept: UROLOGY | Facility: CLINIC | Age: 78
End: 2023-09-07
Payer: MEDICARE

## 2023-09-07 ENCOUNTER — OUTPATIENT (OUTPATIENT)
Dept: OUTPATIENT SERVICES | Facility: HOSPITAL | Age: 78
LOS: 1 days | End: 2023-09-07
Payer: MEDICARE

## 2023-09-07 ENCOUNTER — APPOINTMENT (OUTPATIENT)
Dept: CARDIOLOGY | Facility: CLINIC | Age: 78
End: 2023-09-07
Payer: MEDICARE

## 2023-09-07 VITALS
BODY MASS INDEX: 28.64 KG/M2 | HEART RATE: 63 BPM | WEIGHT: 189 LBS | OXYGEN SATURATION: 98 % | HEIGHT: 68 IN | SYSTOLIC BLOOD PRESSURE: 106 MMHG | DIASTOLIC BLOOD PRESSURE: 60 MMHG

## 2023-09-07 VITALS
SYSTOLIC BLOOD PRESSURE: 116 MMHG | HEART RATE: 59 BPM | RESPIRATION RATE: 16 BRPM | DIASTOLIC BLOOD PRESSURE: 75 MMHG | OXYGEN SATURATION: 97 %

## 2023-09-07 DIAGNOSIS — R35.0 FREQUENCY OF MICTURITION: ICD-10-CM

## 2023-09-07 PROCEDURE — 96402 CHEMO HORMON ANTINEOPL SQ/IM: CPT

## 2023-09-07 PROCEDURE — 99214 OFFICE O/P EST MOD 30 MIN: CPT | Mod: 25

## 2023-09-07 PROCEDURE — 96402U: CUSTOM | Mod: NC

## 2023-09-07 PROCEDURE — 99214 OFFICE O/P EST MOD 30 MIN: CPT

## 2023-09-07 RX ORDER — DESLORATADINE 5 MG/1
5 TABLET ORAL AT BEDTIME
Qty: 30 | Refills: 3 | Status: DISCONTINUED | COMMUNITY
Start: 2022-01-23 | End: 2023-09-07

## 2023-09-07 RX ORDER — TADALAFIL 5 MG/1
5 TABLET ORAL
Qty: 30 | Refills: 5 | Status: DISCONTINUED | COMMUNITY
Start: 2019-07-24 | End: 2023-09-07

## 2023-09-07 RX ORDER — FAMOTIDINE 40 MG/1
40 TABLET, FILM COATED ORAL
Qty: 30 | Refills: 3 | Status: DISCONTINUED | COMMUNITY
Start: 2022-01-23 | End: 2023-09-07

## 2023-09-07 RX ORDER — TADALAFIL 5 MG/1
5 TABLET ORAL
Qty: 30 | Refills: 5 | Status: DISCONTINUED | COMMUNITY
Start: 2018-11-27 | End: 2023-09-07

## 2023-09-07 RX ORDER — LEUPROLIDE ACETATE 30 MG/.5ML
30 INJECTION, SUSPENSION, EXTENDED RELEASE SUBCUTANEOUS
Refills: 0 | Status: COMPLETED | OUTPATIENT
Start: 2023-09-07

## 2023-09-07 RX ORDER — LEUPROLIDE ACETATE 30 MG/.5ML
30 INJECTION, SUSPENSION, EXTENDED RELEASE SUBCUTANEOUS DAILY
Qty: 1 | Refills: 0 | Status: COMPLETED | OUTPATIENT
Start: 2023-09-05 | End: 2023-09-07

## 2023-09-07 RX ORDER — METHYLPREDNISOLONE 4 MG/1
4 TABLET ORAL
Qty: 1 | Refills: 0 | Status: DISCONTINUED | COMMUNITY
Start: 2022-10-09 | End: 2023-09-07

## 2023-09-07 RX ORDER — DOXYCYCLINE HYCLATE 100 MG/1
100 CAPSULE ORAL TWICE DAILY
Qty: 20 | Refills: 0 | Status: DISCONTINUED | COMMUNITY
Start: 2023-06-22 | End: 2023-09-07

## 2023-09-07 RX ADMIN — LEUPROLIDE ACETATE 0 MG: KIT SUBCUTANEOUS at 00:00

## 2023-09-07 NOTE — ASSESSMENT
[FreeTextEntry1] : Assessment: 1.  Unprovoked PE 2.  Elevated PSA - improved 3.  Enlarged prostate - following uroology  4.  HTN    Plan: 1.  Continue xarelto 20mg daily  - he has been on full dose a/c since January 2022.   2.  Duplex showing L DVT - which is chronic 3.  Hope to transiiton to half dose xarelto in the next 3 months if he remains off of lupron  In preparation for this:   Repeat duplex in 3 months and office visit same day   Office visit in 3 months:  if off lupron and duplex ok  then will switch to half dose xarelto.

## 2023-09-07 NOTE — REASON FOR VISIT
[Follow-Up - Clinic] : a clinic follow-up of [FreeTextEntry2] : PE [FreeTextEntry1] : 9/7/2023 Last seen 1 year ago Follows with urology and Dr. Shields pulmonary  Getting lupron - last dose was today.   PSA good.  0.4 MRI July :  No MRI targets identified.   Venous duplex chronic L tibial peroneal trunk June 2022 Echo march 2022 Able to walk dog 1-2 miles no issues  Medications: Labatelol 200mg BID Amlodipine 10mg daily  Xarleto 20mg daily  tamsulosin Breo - as needed  Atorva 40 Vit D 50K per week.   monteleukast   6/22/2022 He was given Lupron x 1 dose in march Due for Lupron again July  PSA improved with Lupron injection. Breathing ok no blood in the urine or stool Xarelto 20mg daily  Legs feel ok, no leg swelling    Medications: Labatelol 200mg BID Amlodipine 10mg daily  Xarleto 20mg daily  tamsulosin Breo Atorva 40 Vit D 50K per week.   monteleukast

## 2023-09-08 ENCOUNTER — RX RENEWAL (OUTPATIENT)
Age: 78
End: 2023-09-08

## 2023-09-08 DIAGNOSIS — C61 MALIGNANT NEOPLASM OF PROSTATE: ICD-10-CM

## 2023-09-08 LAB
PSA FREE FLD-MCNC: 17 %
PSA FREE SERPL-MCNC: 0.03 NG/ML
PSA SERPL-MCNC: 0.16 NG/ML

## 2023-09-08 RX ORDER — MONTELUKAST 10 MG/1
10 TABLET, FILM COATED ORAL
Qty: 90 | Refills: 1 | Status: ACTIVE | COMMUNITY
Start: 2018-04-03 | End: 1900-01-01

## 2023-10-12 ENCOUNTER — APPOINTMENT (OUTPATIENT)
Dept: UROLOGY | Facility: CLINIC | Age: 78
End: 2023-10-12

## 2023-12-07 ENCOUNTER — APPOINTMENT (OUTPATIENT)
Dept: CARDIOLOGY | Facility: CLINIC | Age: 78
End: 2023-12-07
Payer: MEDICARE

## 2023-12-07 VITALS
WEIGHT: 189 LBS | HEIGHT: 68 IN | OXYGEN SATURATION: 98 % | DIASTOLIC BLOOD PRESSURE: 80 MMHG | HEART RATE: 57 BPM | SYSTOLIC BLOOD PRESSURE: 122 MMHG | BODY MASS INDEX: 28.64 KG/M2

## 2023-12-07 DIAGNOSIS — I26.99 OTHER PULMONARY EMBOLISM W/OUT ACUTE COR PULMONALE: ICD-10-CM

## 2023-12-07 PROCEDURE — 99214 OFFICE O/P EST MOD 30 MIN: CPT

## 2023-12-07 PROCEDURE — 93970 EXTREMITY STUDY: CPT

## 2023-12-07 RX ORDER — RIVAROXABAN 20 MG/1
20 TABLET, FILM COATED ORAL
Qty: 90 | Refills: 0 | Status: DISCONTINUED | COMMUNITY
Start: 2022-07-20 | End: 2023-12-07

## 2023-12-07 RX ORDER — RIVAROXABAN 20 MG/1
20 TABLET, FILM COATED ORAL DAILY
Qty: 90 | Refills: 0 | Status: DISCONTINUED | COMMUNITY
Start: 2022-02-17 | End: 2023-12-07

## 2023-12-07 RX ORDER — RIVAROXABAN 20 MG/1
20 TABLET, FILM COATED ORAL
Qty: 90 | Refills: 0 | Status: DISCONTINUED | COMMUNITY
Start: 2022-07-19 | End: 2023-12-07

## 2023-12-07 RX ORDER — RIVAROXABAN 20 MG/1
20 TABLET, FILM COATED ORAL DAILY
Qty: 30 | Refills: 5 | Status: DISCONTINUED | COMMUNITY
Start: 2022-08-17 | End: 2023-12-07

## 2023-12-07 RX ORDER — RIVAROXABAN 20 MG/1
20 TABLET, FILM COATED ORAL
Qty: 90 | Refills: 3 | Status: DISCONTINUED | COMMUNITY
Start: 2022-02-08 | End: 2023-12-07

## 2023-12-07 RX ORDER — RIVAROXABAN 20 MG/1
20 TABLET, FILM COATED ORAL DAILY
Qty: 30 | Refills: 2 | Status: DISCONTINUED | COMMUNITY
Start: 2023-08-03 | End: 2023-12-07

## 2023-12-07 RX ORDER — RIVAROXABAN 20 MG/1
20 TABLET, FILM COATED ORAL
Qty: 90 | Refills: 0 | Status: DISCONTINUED | COMMUNITY
Start: 2022-07-21 | End: 2023-12-07

## 2023-12-07 RX ORDER — RIVAROXABAN 10 MG/1
10 TABLET, FILM COATED ORAL
Qty: 90 | Refills: 3 | Status: ACTIVE | COMMUNITY
Start: 2023-12-07 | End: 1900-01-01

## 2023-12-07 RX ORDER — RIVAROXABAN 20 MG/1
20 TABLET, FILM COATED ORAL
Qty: 30 | Refills: 5 | Status: DISCONTINUED | COMMUNITY
Start: 2023-01-29 | End: 2023-12-07

## 2023-12-07 RX ORDER — RIVAROXABAN 20 MG/1
20 TABLET, FILM COATED ORAL
Qty: 90 | Refills: 0 | Status: DISCONTINUED | COMMUNITY
Start: 2022-05-23 | End: 2023-12-07

## 2023-12-07 RX ORDER — RIVAROXABAN 20 MG/1
20 TABLET, FILM COATED ORAL
Qty: 90 | Refills: 0 | Status: DISCONTINUED | COMMUNITY
Start: 2022-02-18 | End: 2023-12-07

## 2024-01-25 ENCOUNTER — APPOINTMENT (OUTPATIENT)
Dept: CARDIOLOGY | Facility: CLINIC | Age: 79
End: 2024-01-25
Payer: MEDICARE

## 2024-01-25 PROCEDURE — 93970 EXTREMITY STUDY: CPT

## 2024-01-29 NOTE — PATIENT PROFILE ADULT - ARRIVAL FROM
Jake Aliceaultz was seen and treated in our emergency department on 1/29/2024.                Diagnosis:     Jake  .    He may return on this date:          If you have any questions or concerns, please don't hesitate to call.      Phuong Vergara, DO    ______________________________           _______________          _______________  Hospital Representative                              Date                                Time
  Jake Aliceaultz was seen and treated in our emergency department on 1/29/2024.                Diagnosis:     Jake  .    He may return on this date:          If you have any questions or concerns, please don't hesitate to call.      Phuong Vergara, DO    ______________________________           _______________          _______________  Hospital Representative                              Date                                Time
Louann Rosen accompanied Jake Claros to the emergency department on 1/29/2024.    Return date if applicable: 01/30/2024        If you have any questions or concerns, please don't hesitate to call.      Phuong Vergara, DO
Louann Rosen accompanied Jake Claros to the emergency department on 1/29/2024.    Return date if applicable: 01/30/2024        If you have any questions or concerns, please don't hesitate to call.      Phuong Vergara, DO
Home

## 2024-03-21 ENCOUNTER — APPOINTMENT (OUTPATIENT)
Dept: CARDIOLOGY | Facility: CLINIC | Age: 79
End: 2024-03-21
Payer: MEDICARE

## 2024-03-21 ENCOUNTER — APPOINTMENT (OUTPATIENT)
Dept: UROLOGY | Facility: CLINIC | Age: 79
End: 2024-03-21
Payer: MEDICARE

## 2024-03-21 VITALS
OXYGEN SATURATION: 96 % | BODY MASS INDEX: 28.49 KG/M2 | HEART RATE: 56 BPM | WEIGHT: 188 LBS | DIASTOLIC BLOOD PRESSURE: 92 MMHG | HEIGHT: 68 IN | SYSTOLIC BLOOD PRESSURE: 150 MMHG

## 2024-03-21 VITALS — DIASTOLIC BLOOD PRESSURE: 80 MMHG | SYSTOLIC BLOOD PRESSURE: 130 MMHG

## 2024-03-21 DIAGNOSIS — I82.409 ACUTE EMBOLISM AND THROMBOSIS OF UNSPECIFIED DEEP VEINS OF UNSPECIFIED LOWER EXTREMITY: ICD-10-CM

## 2024-03-21 DIAGNOSIS — N40.1 BENIGN PROSTATIC HYPERPLASIA WITH LOWER URINARY TRACT SYMPMS: ICD-10-CM

## 2024-03-21 DIAGNOSIS — C61 MALIGNANT NEOPLASM OF PROSTATE: ICD-10-CM

## 2024-03-21 DIAGNOSIS — N13.8 BENIGN PROSTATIC HYPERPLASIA WITH LOWER URINARY TRACT SYMPMS: ICD-10-CM

## 2024-03-21 PROCEDURE — G2211 COMPLEX E/M VISIT ADD ON: CPT

## 2024-03-21 PROCEDURE — 99214 OFFICE O/P EST MOD 30 MIN: CPT

## 2024-03-21 NOTE — REASON FOR VISIT
[Follow-Up - Clinic] : a clinic follow-up of [FreeTextEntry2] : PE [FreeTextEntry1] : 3/20  Since last visit patient reports doing well  Remains off lupron  seeing urology today  legs feel fine breathing is ok  BP at home is fine.  Didnt take his BP meds this morning    LE venous duplex 1/2024: 1. Right: no evidence of right lower extremity deep venous thrombosis. 2. Left: no evidence of left lower extremity deep venous thrombosis.   12/7/2023 Doing well Venous duplex shows CHRONIC L PT trunk today  Off lupron   Medications: Labatelol 200mg BID Amlodipine 10mg daily  Xarleto 20mg daily  tamsulosin Breo - as needed  Atorva 40 Vit D 50K per week.   monteleukast   9/7/2023 Last seen 1 year ago Follows with urology and Dr. Shields pulmonary  Getting lupron - last dose was today.   PSA good.  0.4 MRI July :  No MRI targets identified.   Venous duplex chronic L tibial peroneal trunk June 2022 Echo march 2022 Able to walk dog 1-2 miles no issues  Medications: Labatelol 200mg BID Amlodipine 10mg daily  Xarleto 20mg daily  tamsulosin Breo - as needed  Atorva 40 Vit D 50K per week.   monteleukast   6/22/2022 He was given Lupron x 1 dose in march Due for Lupron again July  PSA improved with Lupron injection. Breathing ok no blood in the urine or stool Xarelto 20mg daily  Legs feel ok, no leg swelling    Medications: Labatelol 200mg BID Amlodipine 10mg daily  Xarleto 20mg daily  tamsulosin Breo Atorva 40 Vit D 50K per week.   monteleukast

## 2024-03-22 LAB
APPEARANCE: CLEAR
BACTERIA: NEGATIVE /HPF
BILIRUBIN URINE: NEGATIVE
BLOOD URINE: NEGATIVE
CAST: 3 /LPF
COLOR: YELLOW
EPITHELIAL CELLS: 1 /HPF
GLUCOSE QUALITATIVE U: NEGATIVE MG/DL
KETONES URINE: NEGATIVE MG/DL
LEUKOCYTE ESTERASE URINE: NEGATIVE
MICROSCOPIC-UA: NORMAL
NITRITE URINE: NEGATIVE
PH URINE: 5
PROTEIN URINE: NEGATIVE MG/DL
PSA FREE FLD-MCNC: NORMAL %
PSA FREE SERPL-MCNC: <0.01 NG/ML
PSA SERPL-MCNC: 0.1 NG/ML
RED BLOOD CELLS URINE: 1 /HPF
SPECIFIC GRAVITY URINE: 1.02
UROBILINOGEN URINE: 0.2 MG/DL
WHITE BLOOD CELLS URINE: 0 /HPF

## 2024-04-11 ENCOUNTER — APPOINTMENT (OUTPATIENT)
Dept: PULMONOLOGY | Facility: CLINIC | Age: 79
End: 2024-04-11

## 2024-04-29 RX ORDER — MONTELUKAST 10 MG/1
10 TABLET, FILM COATED ORAL DAILY
Qty: 30 | Refills: 5 | Status: ACTIVE | COMMUNITY
Start: 2024-04-29 | End: 1900-01-01

## 2024-10-10 NOTE — H&P ADULT - NEGATIVE GENERAL SYMPTOMS
Medicare Wellness Visit  Plan for Preventive Care    A good way for you to stay healthy is to use preventive care.  Medicare covers many services that can help you stay healthy.* The goal of these services is to find any health problems as quickly as possible. Finding problems early can help make them easier to treat.  Your personal plan below lists the services you may need and when they are due.      Health Maintenance Summary     DTaP/Tdap/Td Vaccine (1 - Tdap)  Overdue since 1/2/2011    COVID-19 Vaccine (4 - 2023-24 season)  Overdue since 9/1/2024    Influenza Vaccine (1)  Due since 9/1/2024    Traditional Medicare- Medicare Wellness Visit (Yearly)  Due since 10/1/2024    Depression Screening (Yearly)  Next due on 10/10/2025    Shingles Vaccine   Completed    Pneumococcal Vaccine 65+   Completed    Meningococcal Vaccine   Aged Out    Hepatitis B Vaccine (For Physician/APC Discussion)   Aged Out    HPV Vaccine   Aged Out           Preventive Care for Women and Men    Heart Screenings (Cardiovascular):  Blood tests are used to check your cholesterol, lipid and triglyceride levels. High levels can increase your risk for heart disease and stroke. High levels can be treated with medications, diet and exercise. Lowering your levels can help keep your heart and blood vessels healthy.  Your provider will order these tests if they are needed.    An ultrasound is done to see if you have an abdominal aortic aneurysm (AAA).  This is an enlargement of one of the main blood vessels that delivers blood to the body.   In the [place], 9,000 deaths are caused by AAA.  You may not even know you have this problem and as many as 1 in 3 people will have a serious problem if it is not treated.  Early diagnosis allows for more effective treatment and cure.  If you have a family history of AAA or are a male age 65-75 who has smoked, you are at higher risk of an AAA.  Your provider can order this test, if needed.    Colorectal  Screening:  There are many tests that are used to check for cancer of your colon and rectum. You and your provider should discuss what test is best for you and when to have it done.  Options include:  Screening Colonoscopy: exam of the entire colon, seen through a flexible lighted tube.  Flexible Sigmoidoscopy: exam of the last third (sigmoid portion) of the colon and rectum, seen through a flexible lighted tube.  Cologuard DNA stool test: a sample of your stool is used to screen for cancer and unseen blood in your stool.  Fecal Occult Blood Test: a sample of your stool is studied to find any unseen blood    Flu Shot:  An immunization that helps to prevent influenza (the flu). You should get this every year. The best time to get the shot is in the fall.    Pneumococcal Shot:  Vaccines help prevent pneumococcal disease, which is any type of illness caused by Streptococcus pneumoniae bacteria. There are two kinds of pneumococcal vaccines available in the [place]:   Pneumococcal conjugate vaccines (PCV20 or Vhtfrng74®)  Pneumococcal polysaccharide vaccine (PPSV23 or Lduuntooz10®)  For those who have never received any pneumococcal conjugate vaccine, CDC recommends PVC20 for adults 65 years or older and adults 19 through 64 years old with certain medical conditions or risk factors.   For those who have previously received PCV13, this should be followed by a dose of PPSV23.     Hepatitis B Shot:  An immunization that helps to protect people from getting Hepatitis B. Hepatitis B is a virus that spreads through contact with infected blood or body fluids. Many people with the virus do not have symptoms.  The virus can lead to serious problems, such as liver disease. Some people are at higher risk than others. Your doctor will tell you if you need this shot.     Diabetes Screening:  A test to measure sugar (glucose) in your blood is called a fasting blood sugar. Fasting means you cannot have food or drink for at least  8 hours before the test. This test can detect diabetes long before you may notice symptoms.    Glaucoma Screening:  Glaucoma screening is performed by your eye doctor. The test measures the fluid pressure inside your eyes to determine if you have glaucoma.     Hepatitis C Screening:  A blood test to see if you have the hepatitis C virus.  Hepatitis C attacks the liver and is a major cause of chronic liver disease.  Medicare will cover a single screening for all adults born between 1945 & 1965, or high risk patients (people who have injected illegal drugs or people who have had blood transfusions).  High risk patients who continue to inject illegal drugs can be screened for Hepatitis C every year.    Smoking and Tobacco-Use Cessation Counseling:  Tobacco is the single greatest cause of disease and early death in our country today. Medication and counseling together can increase a person’s chance of quitting for good.   Medicare covers two quitting attempts per year, with four counseling sessions per attempt (eight sessions in a 12 month period)    Preventive Screening tests for Women    Screening Mammograms and Breast Exams:  An x-ray of your breasts to check for breast cancer before you or your doctor may be able to feel it.  If breast cancer is found early it can usually be treated with success.    Pelvic Exams and Pap Tests:  An exam to check for cervical and vaginal cancer. A Pap test is a lab test in which cells are taken from your cervix and sent to the lab to look for signs of cervical cancer. If cancer of the cervix is found early, chances for a cure are good. Testing can generally end at age 65, or if a woman has a hysterectomy for a benign condition. Your provider may recommend more frequent testing if certain abnormal results are found.    Bone Mass Measurements:  A painless x-ray of your bone density to see if you are at risk for a broken bone. Bone density refers to the thickness of bones or how tightly  the bone tissue is packed.    Preventive Screening tests for Men    Prostate Screening:  Should you have a prostate cancer test (PSA)?  It is up to you to decide if you want a prostate cancer test. Talk to your clinician to find out if the test is right for you.  Things for you to consider and talk about should include:  Benefits and harms of the test  Your family history  How your race/ethnicity may influence the test  If the test may impact other medical conditions you have  Your values on screenings and treatments    *Medicare pays for many preventive services to keep you healthy. For some of these services, you might have to pay a deductible, coinsurance, and / or copayment.  The amounts vary depending on the type of services you need and the kind of Medicare health plan you have.    For further details on screenings offered by Medicare please visit: https://www.medicare.gov/coverage/preventive-screening-services      no fever/no chills

## 2024-11-06 ENCOUNTER — NON-APPOINTMENT (OUTPATIENT)
Age: 79
End: 2024-11-06

## 2024-11-06 ENCOUNTER — APPOINTMENT (OUTPATIENT)
Dept: UROLOGY | Facility: CLINIC | Age: 79
End: 2024-11-06
Payer: MEDICARE

## 2024-11-06 ENCOUNTER — APPOINTMENT (OUTPATIENT)
Dept: CARDIOLOGY | Facility: CLINIC | Age: 79
End: 2024-11-06
Payer: MEDICARE

## 2024-11-06 VITALS
DIASTOLIC BLOOD PRESSURE: 82 MMHG | SYSTOLIC BLOOD PRESSURE: 143 MMHG | BODY MASS INDEX: 28.59 KG/M2 | OXYGEN SATURATION: 97 % | HEART RATE: 61 BPM | WEIGHT: 188 LBS

## 2024-11-06 DIAGNOSIS — Z86.711 PERSONAL HISTORY OF PULMONARY EMBOLISM: ICD-10-CM

## 2024-11-06 DIAGNOSIS — N13.8 BENIGN PROSTATIC HYPERPLASIA WITH LOWER URINARY TRACT SYMPMS: ICD-10-CM

## 2024-11-06 DIAGNOSIS — N40.1 BENIGN PROSTATIC HYPERPLASIA WITH LOWER URINARY TRACT SYMPMS: ICD-10-CM

## 2024-11-06 DIAGNOSIS — Z82.49 FAMILY HISTORY OF ISCHEMIC HEART DISEASE AND OTHER DISEASES OF THE CIRCULATORY SYSTEM: ICD-10-CM

## 2024-11-06 DIAGNOSIS — Z86.39 PERSONAL HISTORY OF OTHER ENDOCRINE, NUTRITIONAL AND METABOLIC DISEASE: ICD-10-CM

## 2024-11-06 PROCEDURE — G2211 COMPLEX E/M VISIT ADD ON: CPT

## 2024-11-06 PROCEDURE — 99214 OFFICE O/P EST MOD 30 MIN: CPT

## 2024-11-07 LAB
APPEARANCE: CLEAR
BACTERIA: NEGATIVE /HPF
BILIRUBIN URINE: NEGATIVE
BLOOD URINE: NEGATIVE
CAST: 1 /LPF
COLOR: YELLOW
EPITHELIAL CELLS: 0 /HPF
GLUCOSE QUALITATIVE U: NEGATIVE MG/DL
KETONES URINE: NEGATIVE MG/DL
LEUKOCYTE ESTERASE URINE: NEGATIVE
MICROSCOPIC-UA: NORMAL
NITRITE URINE: NEGATIVE
PH URINE: 5.5
PROTEIN URINE: NEGATIVE MG/DL
PSA FREE FLD-MCNC: 25 %
PSA FREE SERPL-MCNC: 0.53 NG/ML
PSA SERPL-MCNC: 2.09 NG/ML
RED BLOOD CELLS URINE: 1 /HPF
SPECIFIC GRAVITY URINE: 1.02
UROBILINOGEN URINE: 0.2 MG/DL
WHITE BLOOD CELLS URINE: 0 /HPF

## 2024-12-04 ENCOUNTER — APPOINTMENT (OUTPATIENT)
Dept: CARDIOLOGY | Facility: CLINIC | Age: 79
End: 2024-12-04
Payer: MEDICARE

## 2024-12-04 PROCEDURE — 93306 TTE W/DOPPLER COMPLETE: CPT

## 2024-12-11 DIAGNOSIS — I77.810 THORACIC AORTIC ECTASIA: ICD-10-CM

## 2024-12-13 ENCOUNTER — RX RENEWAL (OUTPATIENT)
Age: 79
End: 2024-12-13

## 2025-05-14 ENCOUNTER — APPOINTMENT (OUTPATIENT)
Dept: UROLOGY | Facility: CLINIC | Age: 80
End: 2025-05-14
Payer: MEDICARE

## 2025-05-14 ENCOUNTER — NON-APPOINTMENT (OUTPATIENT)
Age: 80
End: 2025-05-14

## 2025-05-14 ENCOUNTER — APPOINTMENT (OUTPATIENT)
Dept: CARDIOLOGY | Facility: CLINIC | Age: 80
End: 2025-05-14

## 2025-05-14 VITALS
DIASTOLIC BLOOD PRESSURE: 87 MMHG | BODY MASS INDEX: 28.89 KG/M2 | HEART RATE: 51 BPM | WEIGHT: 190 LBS | SYSTOLIC BLOOD PRESSURE: 143 MMHG | OXYGEN SATURATION: 96 %

## 2025-05-14 DIAGNOSIS — C61 MALIGNANT NEOPLASM OF PROSTATE: ICD-10-CM

## 2025-05-14 DIAGNOSIS — I77.810 THORACIC AORTIC ECTASIA: ICD-10-CM

## 2025-05-14 PROCEDURE — G2211 COMPLEX E/M VISIT ADD ON: CPT

## 2025-05-14 PROCEDURE — 99214 OFFICE O/P EST MOD 30 MIN: CPT

## 2025-05-14 RX ORDER — CEPHALEXIN 500 MG/1
500 TABLET ORAL 3 TIMES DAILY
Qty: 21 | Refills: 0 | Status: ACTIVE | COMMUNITY
Start: 2025-05-14 | End: 1900-01-01

## 2025-05-15 LAB
APPEARANCE: CLEAR
BACTERIA: NEGATIVE /HPF
BILIRUBIN URINE: NEGATIVE
BLOOD URINE: NEGATIVE
CAST: 0 /LPF
COLOR: YELLOW
EPITHELIAL CELLS: 0 /HPF
GLUCOSE QUALITATIVE U: NEGATIVE MG/DL
KETONES URINE: NEGATIVE MG/DL
LEUKOCYTE ESTERASE URINE: NEGATIVE
MICROSCOPIC-UA: NORMAL
NITRITE URINE: NEGATIVE
PH URINE: 5.5
PROTEIN URINE: NEGATIVE MG/DL
PSA FREE FLD-MCNC: 23 %
PSA FREE SERPL-MCNC: 1.09 NG/ML
PSA SERPL-MCNC: 4.76 NG/ML
RED BLOOD CELLS URINE: 1 /HPF
SPECIFIC GRAVITY URINE: 1.02
UROBILINOGEN URINE: 0.2 MG/DL
WHITE BLOOD CELLS URINE: 0 /HPF

## 2025-07-11 PROBLEM — J06.9 UPPER RESPIRATORY INFECTION: Status: ACTIVE | Noted: 2025-07-11 | Resolved: 2025-08-10

## 2025-07-11 RX ORDER — AZITHROMYCIN 250 MG/1
250 TABLET, FILM COATED ORAL
Qty: 1 | Refills: 0 | Status: ACTIVE | COMMUNITY
Start: 2025-07-11 | End: 1900-01-01

## 2025-07-21 RX ORDER — ALBUTEROL SULFATE 90 UG/1
108 (90 BASE) AEROSOL, METERED RESPIRATORY (INHALATION) EVERY 6 HOURS
Qty: 1 | Refills: 5 | Status: ACTIVE | COMMUNITY
Start: 2025-07-21 | End: 1900-01-01

## 2025-07-21 RX ORDER — FLUTICASONE FUROATE AND VILANTEROL TRIFENATATE 200; 25 UG/1; UG/1
200-25 POWDER RESPIRATORY (INHALATION) DAILY
Qty: 1 | Refills: 3 | Status: ACTIVE | COMMUNITY
Start: 2025-07-21 | End: 1900-01-01

## 2025-07-21 RX ORDER — OLOPATADINE HYDROCHLORIDE 665 UG/1
0.6 SPRAY, METERED NASAL TWICE DAILY
Qty: 1 | Refills: 3 | Status: ACTIVE | COMMUNITY
Start: 2025-07-21 | End: 1900-01-01

## 2025-09-03 ENCOUNTER — OUTPATIENT (OUTPATIENT)
Dept: OUTPATIENT SERVICES | Facility: HOSPITAL | Age: 80
LOS: 1 days | End: 2025-09-03
Payer: MEDICARE

## 2025-09-03 ENCOUNTER — APPOINTMENT (OUTPATIENT)
Dept: CV DIAGNOSITCS | Facility: HOSPITAL | Age: 80
End: 2025-09-03

## 2025-09-03 ENCOUNTER — RESULT REVIEW (OUTPATIENT)
Age: 80
End: 2025-09-03

## 2025-09-03 DIAGNOSIS — I25.10 ATHEROSCLEROTIC HEART DISEASE OF NATIVE CORONARY ARTERY WITHOUT ANGINA PECTORIS: ICD-10-CM

## 2025-09-03 PROCEDURE — 93356 MYOCRD STRAIN IMG SPCKL TRCK: CPT

## 2025-09-03 PROCEDURE — 93306 TTE W/DOPPLER COMPLETE: CPT | Mod: 26

## 2025-09-03 PROCEDURE — 76376 3D RENDER W/INTRP POSTPROCES: CPT

## 2025-09-03 PROCEDURE — 76376 3D RENDER W/INTRP POSTPROCES: CPT | Mod: 26

## 2025-09-03 PROCEDURE — 93306 TTE W/DOPPLER COMPLETE: CPT
